# Patient Record
Sex: MALE | Race: WHITE | NOT HISPANIC OR LATINO | Employment: OTHER | URBAN - METROPOLITAN AREA
[De-identification: names, ages, dates, MRNs, and addresses within clinical notes are randomized per-mention and may not be internally consistent; named-entity substitution may affect disease eponyms.]

---

## 2017-01-20 ENCOUNTER — OFFICE VISIT (OUTPATIENT)
Dept: FAMILY MEDICINE | Facility: CLINIC | Age: 52
End: 2017-01-20
Payer: COMMERCIAL

## 2017-01-20 VITALS
DIASTOLIC BLOOD PRESSURE: 70 MMHG | SYSTOLIC BLOOD PRESSURE: 122 MMHG | BODY MASS INDEX: 27.74 KG/M2 | TEMPERATURE: 98 F | WEIGHT: 172.63 LBS | HEART RATE: 71 BPM | HEIGHT: 66 IN

## 2017-01-20 DIAGNOSIS — R53.83 FATIGUE, UNSPECIFIED TYPE: ICD-10-CM

## 2017-01-20 DIAGNOSIS — R39.12 WEAK URINE STREAM: ICD-10-CM

## 2017-01-20 DIAGNOSIS — Z00.00 ROUTINE GENERAL MEDICAL EXAMINATION AT A HEALTH CARE FACILITY: Primary | ICD-10-CM

## 2017-01-20 DIAGNOSIS — R25.2 CRAMPS OF LOWER EXTREMITY, UNSPECIFIED LATERALITY: ICD-10-CM

## 2017-01-20 DIAGNOSIS — Z12.11 SCREENING FOR COLON CANCER: ICD-10-CM

## 2017-01-20 PROCEDURE — 99999 PR PBB SHADOW E&M-EST. PATIENT-LVL III: CPT | Mod: PBBFAC,,, | Performed by: NURSE PRACTITIONER

## 2017-01-20 PROCEDURE — 99396 PREV VISIT EST AGE 40-64: CPT | Mod: S$GLB,,, | Performed by: NURSE PRACTITIONER

## 2017-01-20 RX ORDER — ALPRAZOLAM 0.5 MG/1
TABLET ORAL
Refills: 0 | COMMUNITY
Start: 2016-10-27 | End: 2017-01-20

## 2017-01-20 NOTE — PROGRESS NOTES
Subjective:       Patient ID: Chino Ramirez is a 51 y.o. male.    Chief Complaint: Annual Exam    HPI Comments: Here for annual exam. Social, medical, surgical and family history reviewed and updated today, states some feelings of fatigue, requests testosterone level be done.    Vitals:    01/20/17 0814   BP: 122/70   Pulse: 71   Temp: 98.4 °F (36.9 °C)     Review of Systems   Constitutional: Positive for fatigue. Negative for chills and fever.   HENT: Negative.    Respiratory: Negative for shortness of breath.    Cardiovascular: Negative for chest pain, palpitations and leg swelling.   Gastrointestinal: Negative for blood in stool, diarrhea, nausea and vomiting.   Genitourinary:        No nocturia, occ has weak urine stream   Musculoskeletal: Negative.    Skin: Negative.    Psychiatric/Behavioral: Negative for dysphoric mood, sleep disturbance and suicidal ideas. The patient is not nervous/anxious.        Past Medical History   Diagnosis Date    Allergic rhinitis due to other allergen 8/11/2010    ED (erectile dysfunction)     Follow-up examination, following unspecified surgery     HLD (hyperlipidemia) 1/2/2012    Hyperlipidemia     Hypogonadism male 1/2/2012    Osteoarthrosis, unspecified whether generalized or localized, lower leg 9/18/2014    PONV (postoperative nausea and vomiting)     Tendonitis 2014     right elbow     Objective:      Physical Exam   Constitutional: He is oriented to person, place, and time. Vital signs are normal. He appears well-developed and well-nourished. He is active and cooperative. He does not have a sickly appearance. He does not appear ill.   HENT:   Head: Normocephalic.   Right Ear: Tympanic membrane, external ear and ear canal normal.   Left Ear: Tympanic membrane, external ear and ear canal normal.   Mouth/Throat: Oropharynx is clear and moist.   Eyes: Conjunctivae and EOM are normal. Pupils are equal, round, and reactive to light.   Neck: Neck supple. Carotid  bruit is not present. Thyromegaly present. No thyroid mass present.   Cardiovascular: Normal rate, regular rhythm, S1 normal, S2 normal, normal heart sounds and intact distal pulses.    No murmur heard.  Pulses:       Posterior tibial pulses are 2+ on the right side, and 2+ on the left side.   No edema teddy   Pulmonary/Chest: Effort normal and breath sounds normal.   Abdominal: Soft. Bowel sounds are normal. He exhibits no abdominal bruit. There is no hepatosplenomegaly. There is no tenderness.   Musculoskeletal: Normal range of motion.   Lymphadenopathy:     He has no cervical adenopathy.        Right: No supraclavicular adenopathy present.        Left: No supraclavicular adenopathy present.   Neurological: He is alert and oriented to person, place, and time.   Skin: Skin is warm and dry.   Psychiatric: He has a normal mood and affect.   Nursing note and vitals reviewed.      Assessment:       1. Routine general medical examination at a health care facility    2. Weak urine stream    3. Screening for colon cancer    4. Cramps of lower extremity, unspecified laterality    5. Fatigue, unspecified type        Plan:       Routine general medical examination at a health care facility  -     Lipid panel; Future; Expected date: 1/20/17  -     CBC auto differential; Future; Expected date: 1/20/17  -     Comprehensive metabolic panel; Future; Expected date: 1/20/17  -     TSH; Future; Expected date: 1/20/17  -     Testosterone; Future; Expected date: 1/20/17  -     Testosterone, free; Future; Expected date: 1/20/17  -     PSA, total and free; Future; Expected date: 1/20/17  -     Magnesium; Future; Expected date: 1/20/17    Weak urine stream  -     PSA, total and free; Future; Expected date: 1/20/17    Screening for colon cancer  -     Ambulatory referral to Gastroenterology    Cramps of lower extremity, unspecified laterality  -     Magnesium; Future; Expected date: 1/20/17    Fatigue, unspecified type  -     TSH; Future;  Expected date: 1/20/17  -     Testosterone; Future; Expected date: 1/20/17  -     Testosterone, free; Future; Expected date: 1/20/17  -     Magnesium; Future; Expected date: 1/20/17        declines flu and prevnar 13, fasting labs ordered, encouarged to do before 9am, verbalized understanding. RTC annually or sooner if needed.    Return in about 1 year (around 1/20/2018) for pending test results.

## 2017-01-20 NOTE — MR AVS SNAPSHOT
[facility]  [address]  [facility] [address]  Phone: [phone]  Fax: [phone]                  Chino Ramirez   2017 8:00 AM   Office Visit    Description:  Male : 1965   Provider:  MARNI Medrano   Department:  [facility]           Reason for Visit     Annual Exam           Diagnoses this Visit        Comments    Routine general medical examination at a health care facility    -  Primary     Weak urine stream         Screening for colon cancer         Cramps of lower extremity, unspecified laterality         Fatigue, unspecified type                To Do List           Future Appointments        Provider Department Dept Phone    2017 11:15 AM LAB, [place] [facility] [phone]    2/3/2017 8:30 AM MUKUND Virgen OD [facility] Optometry [phone]    2017 2:20 PM Terence Newton MD [facility] Gastroenterology [phone]      Goals (5 Years of Data)     None      [facility]     [facility] - 24/7 Assistance  Registered nurses in the [facility] Center provide clinical advisement, health education, appointment booking, and other advisory services.  Call for this free service at [phone].             Medications           Message regarding Medications     Verify the changes and/or additions to your medication regime listed below are the same as discussed with your clinician today.  If any of these changes or additions are incorrect, please notify your healthcare provider.        STOP taking these medications     alprazolam (XANAX) 0.5 MG tablet TK 1 T PO  BID PRA    esomeprazole (NEXIUM) 40 MG capsule Take 1 capsule (40 mg total) by mouth before breakfast.           Verify that the below list of medications is an accurate representation of the medications you are currently taking.  If none reported, the list may be blank. If incorrect, please contact your  "healthcare provider. Carry this list with you in case of emergency.           Current Medications     fluticasone (FLONASE) 50 mcg/actuation nasal spray 2 sprays by Each Nare route once daily.    multivitamin capsule Take 1 capsule by mouth once daily.           Clinical Reference Information           Vital Signs - Last Recorded  Most recent update: 1/20/2017  8:17 AM by Diana Arias MA    BP Pulse Temp Ht Wt BMI    122/70 71 98.4 °F (36.9 °C) (Oral) 5' 6" (1.676 m) 78.3 kg (172 lb 9.9 oz) 27.86 kg/m2      Blood Pressure          Most Recent Value    BP  122/70      Allergies as of 1/20/2017     Cat Hair Extract      Immunizations Administered on Date of Encounter - 1/20/2017     None      Orders Placed During Today's Visit      Normal Orders This Visit    Ambulatory referral to Gastroenterology     Future Labs/Procedures Expected by Expires    CBC auto differential  1/20/2017 3/21/2018    Comprehensive metabolic panel  1/20/2017 3/21/2018    Lipid panel  1/20/2017 3/21/2018    Magnesium  1/20/2017 3/21/2018    PSA, total and free  1/20/2017 3/21/2018    Testosterone, free  1/20/2017 3/21/2018    Testosterone  1/20/2017 3/21/2018    TSH  1/20/2017 3/21/2018      "

## 2017-01-21 ENCOUNTER — LAB VISIT (OUTPATIENT)
Dept: LAB | Facility: HOSPITAL | Age: 52
End: 2017-01-21
Attending: FAMILY MEDICINE
Payer: COMMERCIAL

## 2017-01-21 DIAGNOSIS — Z00.00 ROUTINE GENERAL MEDICAL EXAMINATION AT A HEALTH CARE FACILITY: ICD-10-CM

## 2017-01-21 DIAGNOSIS — R39.12 WEAK URINE STREAM: ICD-10-CM

## 2017-01-21 DIAGNOSIS — R53.83 FATIGUE, UNSPECIFIED TYPE: ICD-10-CM

## 2017-01-21 DIAGNOSIS — R25.2 CRAMPS OF LOWER EXTREMITY, UNSPECIFIED LATERALITY: ICD-10-CM

## 2017-01-21 LAB
ALBUMIN SERPL BCP-MCNC: 3.7 G/DL
ALP SERPL-CCNC: 45 U/L
ALT SERPL W/O P-5'-P-CCNC: 24 U/L
ANION GAP SERPL CALC-SCNC: 6 MMOL/L
AST SERPL-CCNC: 28 U/L
BASOPHILS # BLD AUTO: 0.06 K/UL
BASOPHILS NFR BLD: 1.1 %
BILIRUB SERPL-MCNC: 0.4 MG/DL
BUN SERPL-MCNC: 14 MG/DL
CALCIUM SERPL-MCNC: 9 MG/DL
CHLORIDE SERPL-SCNC: 108 MMOL/L
CHOLEST/HDLC SERPL: 6.3 {RATIO}
CO2 SERPL-SCNC: 27 MMOL/L
CREAT SERPL-MCNC: 1.3 MG/DL
DIFFERENTIAL METHOD: ABNORMAL
EOSINOPHIL # BLD AUTO: 0.5 K/UL
EOSINOPHIL NFR BLD: 9.7 %
ERYTHROCYTE [DISTWIDTH] IN BLOOD BY AUTOMATED COUNT: 13 %
EST. GFR  (AFRICAN AMERICAN): >60 ML/MIN/1.73 M^2
EST. GFR  (NON AFRICAN AMERICAN): >60 ML/MIN/1.73 M^2
GLUCOSE SERPL-MCNC: 86 MG/DL
HCT VFR BLD AUTO: 42.1 %
HDL/CHOLESTEROL RATIO: 15.9 %
HDLC SERPL-MCNC: 182 MG/DL
HDLC SERPL-MCNC: 29 MG/DL
HGB BLD-MCNC: 14.4 G/DL
LDLC SERPL CALC-MCNC: 134.8 MG/DL
LYMPHOCYTES # BLD AUTO: 2.1 K/UL
LYMPHOCYTES NFR BLD: 39.2 %
MAGNESIUM SERPL-MCNC: 2.2 MG/DL
MCH RBC QN AUTO: 29.9 PG
MCHC RBC AUTO-ENTMCNC: 34.2 %
MCV RBC AUTO: 88 FL
MONOCYTES # BLD AUTO: 0.5 K/UL
MONOCYTES NFR BLD: 10.1 %
NEUTROPHILS # BLD AUTO: 2.1 K/UL
NEUTROPHILS NFR BLD: 39.7 %
NONHDLC SERPL-MCNC: 153 MG/DL
PLATELET # BLD AUTO: 204 K/UL
PMV BLD AUTO: 11.4 FL
POTASSIUM SERPL-SCNC: 4.2 MMOL/L
PROSTATE SPECIFIC ANTIGEN, TOTAL: 1.1 NG/ML
PROT SERPL-MCNC: 7 G/DL
PSA FREE MFR SERPL: 21.82 %
PSA FREE SERPL-MCNC: 0.24 NG/ML
RBC # BLD AUTO: 4.81 M/UL
SODIUM SERPL-SCNC: 141 MMOL/L
TESTOST SERPL-MCNC: 743 NG/DL
TRIGL SERPL-MCNC: 91 MG/DL
TSH SERPL DL<=0.005 MIU/L-ACNC: 1.68 UIU/ML
WBC # BLD AUTO: 5.25 K/UL

## 2017-01-21 PROCEDURE — 84443 ASSAY THYROID STIM HORMONE: CPT

## 2017-01-21 PROCEDURE — 83735 ASSAY OF MAGNESIUM: CPT

## 2017-01-21 PROCEDURE — 85025 COMPLETE CBC W/AUTO DIFF WBC: CPT

## 2017-01-21 PROCEDURE — 84403 ASSAY OF TOTAL TESTOSTERONE: CPT

## 2017-01-21 PROCEDURE — 84402 ASSAY OF FREE TESTOSTERONE: CPT

## 2017-01-21 PROCEDURE — 84153 ASSAY OF PSA TOTAL: CPT

## 2017-01-21 PROCEDURE — 80053 COMPREHEN METABOLIC PANEL: CPT

## 2017-01-21 PROCEDURE — 80061 LIPID PANEL: CPT

## 2017-01-25 LAB — TESTOST FREE SERPL-MCNC: 13.4 PG/ML

## 2017-01-27 ENCOUNTER — TELEPHONE (OUTPATIENT)
Dept: FAMILY MEDICINE | Facility: CLINIC | Age: 52
End: 2017-01-27

## 2017-01-30 ENCOUNTER — OFFICE VISIT (OUTPATIENT)
Dept: OPTOMETRY | Facility: CLINIC | Age: 52
End: 2017-01-30
Payer: COMMERCIAL

## 2017-01-30 DIAGNOSIS — H02.055 TRICHIASIS OF LEFT LOWER EYELID WITHOUT ENTROPION: Primary | ICD-10-CM

## 2017-01-30 PROCEDURE — 67820 REVISE EYELASHES: CPT | Mod: E2,S$GLB,, | Performed by: OPTOMETRIST

## 2017-01-30 PROCEDURE — 99999 PR PBB SHADOW E&M-EST. PATIENT-LVL I: CPT | Mod: PBBFAC,,, | Performed by: OPTOMETRIST

## 2017-01-30 PROCEDURE — 99499 UNLISTED E&M SERVICE: CPT | Mod: S$GLB,,, | Performed by: OPTOMETRIST

## 2017-01-30 NOTE — PROGRESS NOTES
HPI     Eye Problem    Additional comments: hx of trichiasis --- needs lashes pulled OS           Eye Pain    Additional comments: OS red, irritated x 5 days -- no gtts           Comments   Recurrent spk / conj injection due to trichiasis LLL       Last edited by MUKUND Virgen, OD on 1/30/2017 12:02 PM. (History)            Assessment /Plan     For exam results, see Encounter Report.    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

## 2017-02-23 ENCOUNTER — OFFICE VISIT (OUTPATIENT)
Dept: OPTOMETRY | Facility: CLINIC | Age: 52
End: 2017-02-23
Payer: COMMERCIAL

## 2017-02-23 DIAGNOSIS — H02.055 TRICHIASIS OF LEFT LOWER EYELID WITHOUT ENTROPION: Primary | ICD-10-CM

## 2017-02-23 PROCEDURE — 99499 UNLISTED E&M SERVICE: CPT | Mod: S$GLB,,, | Performed by: OPTOMETRIST

## 2017-02-23 PROCEDURE — 99999 PR PBB SHADOW E&M-EST. PATIENT-LVL II: CPT | Mod: PBBFAC,,, | Performed by: OPTOMETRIST

## 2017-02-23 PROCEDURE — 67820 REVISE EYELASHES: CPT | Mod: E2,S$GLB,, | Performed by: OPTOMETRIST

## 2017-02-23 NOTE — PROGRESS NOTES
HPI     Eye Problem    Additional comments: pt needs lashed pulled from LLL            Comments   Recurrent trichiasis LLL       Last edited by MUKNUD Virgen, OD on 2/23/2017 10:36 AM. (History)            Assessment /Plan     For exam results, see Encounter Report.    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

## 2017-04-21 ENCOUNTER — TELEPHONE (OUTPATIENT)
Dept: FAMILY MEDICINE | Facility: CLINIC | Age: 52
End: 2017-04-21

## 2017-04-21 NOTE — TELEPHONE ENCOUNTER
----- Message from Mounika Pickett sent at 4/21/2017  2:52 PM CDT -----  Contact: patient  Patient calling in regards to scheduling a same day appt today. He has pain on the right side of ribs and hip. Please advise.  Call back .  Thanks!

## 2017-04-21 NOTE — TELEPHONE ENCOUNTER
LVM for ER /walk in with number to [facility] walk in clinic and also call on call md [facility] if needed or if able to wait call Monday

## 2017-04-24 ENCOUNTER — HOSPITAL ENCOUNTER (OUTPATIENT)
Dept: RADIOLOGY | Facility: HOSPITAL | Age: 52
Discharge: HOME | End: 2017-04-24
Attending: FAMILY MEDICINE
Payer: COMMERCIAL

## 2017-04-24 ENCOUNTER — OFFICE VISIT (OUTPATIENT)
Dept: FAMILY MEDICINE | Facility: CLINIC | Age: 52
End: 2017-04-24
Payer: COMMERCIAL

## 2017-04-24 VITALS
HEIGHT: 66 IN | OXYGEN SATURATION: 97 % | BODY MASS INDEX: 27.35 KG/M2 | SYSTOLIC BLOOD PRESSURE: 118 MMHG | WEIGHT: 170.19 LBS | DIASTOLIC BLOOD PRESSURE: 62 MMHG | RESPIRATION RATE: 18 BRPM | HEART RATE: 61 BPM

## 2017-04-24 DIAGNOSIS — Z11.59 NEED FOR HEPATITIS C SCREENING TEST: ICD-10-CM

## 2017-04-24 DIAGNOSIS — R07.89 RIGHT-SIDED CHEST WALL PAIN: ICD-10-CM

## 2017-04-24 DIAGNOSIS — R07.89 RIGHT-SIDED CHEST WALL PAIN: Primary | ICD-10-CM

## 2017-04-24 PROCEDURE — 71100 X-RAY EXAM RIBS UNI 2 VIEWS: CPT | Mod: TC,PO

## 2017-04-24 PROCEDURE — 99999 PR PBB SHADOW E&M-EST. PATIENT-LVL III: CPT | Mod: PBBFAC,,, | Performed by: FAMILY MEDICINE

## 2017-04-24 PROCEDURE — 71100 X-RAY EXAM RIBS UNI 2 VIEWS: CPT | Mod: 26,,, | Performed by: RADIOLOGY

## 2017-04-24 PROCEDURE — 99214 OFFICE O/P EST MOD 30 MIN: CPT | Mod: S$GLB,,, | Performed by: FAMILY MEDICINE

## 2017-04-24 PROCEDURE — 1160F RVW MEDS BY RX/DR IN RCRD: CPT | Mod: S$GLB,,, | Performed by: FAMILY MEDICINE

## 2017-04-24 NOTE — MR AVS SNAPSHOT
[facility]  [address]  [place] [address]  Phone: [phone]  Fax: [phone]                  Chino Ramirez   2017 7:40 AM   Office Visit    Description:  Male : 1965   Provider:  Víctor Jansen MD   Department:  [facility]           Reason for Visit     Chest Pain           Diagnoses this Visit        Comments    Right-sided chest wall pain    -  Primary     Need for hepatitis C screening test                To Do List           Future Appointments        Provider Department Dept Phone    2017 8:30 AM [facility] XR2 [facility] [phone]    2017 11:15 AM [facility] [place] [facility] [phone]    2017 8:00 AM [facility] US1 [facility] [phone]      Goals (5 Years of Data)     None      [facility]     [facility] Care Line -  Assistance  Unless otherwise directed by your provider, please contact [facility], our nurse care line that is available for  assistance.     Registered nurses in the [facility] provide: appointment scheduling, clinical advisement, health education, and other advisory services.  Call: [phone] (toll free)               Medications           Message regarding Medications     Verify the changes and/or additions to your medication regime listed below are the same as discussed with your clinician today.  If any of these changes or additions are incorrect, please notify your healthcare provider.             Verify that the below list of medications is an accurate representation of the medications you are currently taking.  If none reported, the list may be blank. If incorrect, please contact your healthcare provider. Carry this list with you in case of emergency.           Current Medications     fluticasone (FLONASE) 50 mcg/actuation nasal spray 2 sprays by Each Nare route once daily.    multivitamin capsule Take  "1 capsule by mouth once daily.           Clinical Reference Information           Your Vitals Were     BP Pulse Resp Height Weight SpO2    118/62 (BP Location: Right arm, Patient Position: Sitting, BP Method: Manual) 61 18 5' 6" (1.676 m) 77.2 kg (170 lb 3.1 oz) 97%    BMI                27.47 kg/m2          Blood Pressure          Most Recent Value    BP  118/62      Allergies as of 4/24/2017     Cat Hair Extract      Immunizations Administered on Date of Encounter - 4/24/2017     None      Orders Placed During Today's Visit     Future Labs/Procedures Expected by Expires    Comprehensive metabolic panel  4/24/2017 7/23/2017    Hepatitis C antibody  4/24/2017 6/23/2018    US Abdomen Complete  4/24/2017 7/23/2017    X-Ray Ribs 2 View Right  4/24/2017 4/24/2018      Language Assistance Services     ATTENTION: Language assistance services are available, free of charge. Please call [phone].      ATENCIÓN: Si habla español, tiene a rico disposición servicios gratuitos de asistencia lingüística. Llame al [phone].     CHÚ Ý: N?u b?n nói Ti?ng Vi?t, có các d?ch v? h? tr? ngôn ng? mi?n phí dành cho b?n. G?i s? [phone].         [facility] complies with applicable Federal civil rights laws and does not discriminate on the basis of race, color, national origin, age, disability, or sex.        "

## 2017-04-24 NOTE — PROGRESS NOTES
Subjective:       Patient ID: Chino Ramirez is a 51 y.o. male.    Chief Complaint: Chest Pain (Right sided rib pain)    HPI     C/o right sided chest wall pain x 1 year. Intermittent i.e 2-3 x per week. Lasts for 1-2 hours. Achy in nature. Ps: 3-6/10.  Not worse with movement. On occasion, occurs day after drinking etoh.  No pain now.      Review of Systems        Review of Systems   Constitutional: Negative for fever and chills.   HENT: Negative for hearing loss and neck stiffness.    Eyes: Negative for redness and itching.   Respiratory: Negative for cough and choking.    Cardiovascular: Negative for chest pain and leg swelling.  Abdomen: Negative for abdominal pain and blood in stool.   Genitourinary: Negative for dysuria and flank pain.   Musculoskeletal: Negative for back pain and gait problem.   Neurological: Negative for light-headedness and headaches.   Hematological: Negative for adenopathy.   Psychiatric/Behavioral: Negative for behavioral problems.       Objective:      Physical Exam   HENT:   Head: Atraumatic.   Eyes: Conjunctivae are normal. Pupils are equal, round, and reactive to light.   Neck: Normal range of motion.   Cardiovascular: Normal rate and regular rhythm.    No murmur heard.  Pulmonary/Chest: Effort normal and breath sounds normal. He has no wheezes. He exhibits no tenderness.   Abdominal: Soft. Bowel sounds are normal. There is no tenderness.   Lymphadenopathy:     He has no cervical adenopathy.       Assessment:       1. Right-sided chest wall pain    2. Need for hepatitis C screening test        Plan:       Right-sided chest wall pain  -     US Abdomen Complete; Future; Expected date: 4/24/17  -     X-Ray Ribs 2 View Right; Future; Expected date: 4/24/17  -     Comprehensive metabolic panel; Future; Expected date: 4/24/17    Need for hepatitis C screening test  -     Hepatitis C antibody; Future; Expected date: 4/24/17          Plan:  Labs today

## 2017-04-30 ENCOUNTER — PATIENT MESSAGE (OUTPATIENT)
Dept: FAMILY MEDICINE | Facility: CLINIC | Age: 52
End: 2017-04-30

## 2017-05-08 ENCOUNTER — HOSPITAL ENCOUNTER (OUTPATIENT)
Dept: RADIOLOGY | Facility: HOSPITAL | Age: 52
Discharge: HOME | End: 2017-05-08
Attending: FAMILY MEDICINE
Payer: COMMERCIAL

## 2017-05-08 DIAGNOSIS — R07.89 RIGHT-SIDED CHEST WALL PAIN: ICD-10-CM

## 2017-05-08 PROCEDURE — 76700 US EXAM ABDOM COMPLETE: CPT | Mod: 26,,, | Performed by: RADIOLOGY

## 2017-05-08 PROCEDURE — 76700 US EXAM ABDOM COMPLETE: CPT | Mod: TC,PO

## 2017-05-15 ENCOUNTER — PATIENT MESSAGE (OUTPATIENT)
Dept: FAMILY MEDICINE | Facility: CLINIC | Age: 52
End: 2017-05-15

## 2017-07-13 ENCOUNTER — TELEPHONE (OUTPATIENT)
Dept: OPTOMETRY | Facility: CLINIC | Age: 52
End: 2017-07-13

## 2017-07-13 NOTE — TELEPHONE ENCOUNTER
----- Message from Richard Shaw sent at 7/13/2017  2:10 PM CDT -----  Contact: same  Patient called in and stated he wanted to see Dr. Virgen today 7/13/17 and stated Dr. Virgen is aware of his situation.  Patient does have appt tomorrow 7/14/17 AM but states he cannot wait.  Patient call back number is [phone]

## 2017-07-14 ENCOUNTER — OFFICE VISIT (OUTPATIENT)
Dept: OPTOMETRY | Facility: CLINIC | Age: 52
End: 2017-07-14
Payer: COMMERCIAL

## 2017-07-14 DIAGNOSIS — H02.055 TRICHIASIS OF LEFT LOWER EYELID WITHOUT ENTROPION: Primary | ICD-10-CM

## 2017-07-14 PROCEDURE — 67820 REVISE EYELASHES: CPT | Mod: E2,S$GLB,, | Performed by: OPTOMETRIST

## 2017-07-14 PROCEDURE — 99499 UNLISTED E&M SERVICE: CPT | Mod: S$GLB,,, | Performed by: OPTOMETRIST

## 2017-07-14 PROCEDURE — 99999 PR PBB SHADOW E&M-EST. PATIENT-LVL II: CPT | Mod: PBBFAC,,, | Performed by: OPTOMETRIST

## 2017-07-14 NOTE — PROGRESS NOTES
HPI     Eye Pain    Additional comments: needs lash pulled from LLL            Comments   Recurrent trichiasis OS       Last edited by MUKUND Virgen, OD on 7/14/2017 12:23 PM. (History)        ROS     Positive for: Eyes    Negative for: Constitutional, Gastrointestinal, Neurological, Skin,   Genitourinary, Musculoskeletal, HENT, Endocrine, Cardiovascular,   Respiratory, Psychiatric, Allergic/Imm, Heme/Lymph    Last edited by MUKUND Virgen, OD on 7/14/2017 12:23 PM. (History)        Assessment /Plan     For exam results, see Encounter Report.    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

## 2017-10-05 ENCOUNTER — PATIENT OUTREACH (OUTPATIENT)
Dept: ADMINISTRATIVE | Facility: HOSPITAL | Age: 52
End: 2017-10-05

## 2017-10-19 ENCOUNTER — OFFICE VISIT (OUTPATIENT)
Dept: OPTOMETRY | Facility: CLINIC | Age: 52
End: 2017-10-19
Payer: COMMERCIAL

## 2017-10-19 DIAGNOSIS — H02.055 TRICHIASIS OF LEFT LOWER EYELID WITHOUT ENTROPION: Primary | ICD-10-CM

## 2017-10-19 PROCEDURE — 99999 PR PBB SHADOW E&M-EST. PATIENT-LVL II: CPT | Mod: PBBFAC,,, | Performed by: OPTOMETRIST

## 2017-10-19 PROCEDURE — 67820 REVISE EYELASHES: CPT | Mod: LT,S$GLB,, | Performed by: OPTOMETRIST

## 2017-10-19 PROCEDURE — 99499 UNLISTED E&M SERVICE: CPT | Mod: S$GLB,,, | Performed by: OPTOMETRIST

## 2017-10-19 NOTE — PROGRESS NOTES
HPI     Eye Problem    Additional comments: hx of trichiasis -- needs lashed pulled OS // no   gtts           Eye Pain    Additional comments: some discomfort           Comments   Recurrent trichiasis OS       Last edited by MUKUND Virgen, OD on 10/19/2017  9:46 AM. (History)        ROS     Positive for: Eyes    Negative for: Constitutional, Gastrointestinal, Neurological, Skin,   Genitourinary, Musculoskeletal, HENT, Endocrine, Cardiovascular,   Respiratory, Psychiatric, Allergic/Imm, Heme/Lymph    Last edited by MUKUND Virgen, OD on 10/19/2017  9:46 AM. (History)        Assessment /Plan     For exam results, see Encounter Report.    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

## 2017-11-10 ENCOUNTER — PATIENT OUTREACH (OUTPATIENT)
Dept: ADMINISTRATIVE | Facility: HOSPITAL | Age: 52
End: 2017-11-10

## 2017-11-10 NOTE — LETTER
November 10, 2017    Chino Ramirez  [address]  [facility] [address]             [facility]  [address]  [facility] [address]  Phone: [phone] Dear Mr. Ramirez:    We have tried to reach you by [facility] email unsuccessfully.     [facility] is committed to your overall health and would like to ensure that you are up to date on your recommended health testing.   Dr. Jansen has found that you may be due for the following:     Tetanus immunization   Colonoscopy (Colorectal screening)   Influenza vaccine       If you have had any of the above done at another facility, please let us know by calling or faxing to the numbers below so that your medical record can be updated. If you have a copy of these records, please fax them to the fax number below.  If not, please call [phone] so that we can get the necessary information to obtain copies from that facility.     Otherwise, please schedule these appointments at your earliest convenience by calling [phone] or going to [email or web address]       If you have any questions or concerns, please don't hesitate to call.    Sincerely,    Amelia Hadley  Clinical Care Coordinator  [facility] [address] [place] [address]  Phone: [phone]   Fax: [phone]

## 2017-11-13 ENCOUNTER — OFFICE VISIT (OUTPATIENT)
Dept: FAMILY MEDICINE | Facility: CLINIC | Age: 52
End: 2017-11-13
Payer: COMMERCIAL

## 2017-11-13 VITALS
SYSTOLIC BLOOD PRESSURE: 102 MMHG | WEIGHT: 174.38 LBS | OXYGEN SATURATION: 97 % | DIASTOLIC BLOOD PRESSURE: 64 MMHG | HEIGHT: 66 IN | HEART RATE: 66 BPM | BODY MASS INDEX: 28.03 KG/M2

## 2017-11-13 DIAGNOSIS — Z00.00 ROUTINE MEDICAL EXAM: Primary | ICD-10-CM

## 2017-11-13 DIAGNOSIS — Z12.11 COLON CANCER SCREENING: ICD-10-CM

## 2017-11-13 DIAGNOSIS — R16.0 HEPATOMEGALY: ICD-10-CM

## 2017-11-13 DIAGNOSIS — R68.82 DECREASED LIBIDO: ICD-10-CM

## 2017-11-13 PROCEDURE — 99999 PR PBB SHADOW E&M-EST. PATIENT-LVL III: CPT | Mod: PBBFAC,,, | Performed by: FAMILY MEDICINE

## 2017-11-13 PROCEDURE — 99396 PREV VISIT EST AGE 40-64: CPT | Mod: S$GLB,,, | Performed by: FAMILY MEDICINE

## 2017-11-13 NOTE — PROGRESS NOTES
Subjective:       Patient ID: Chino Ramirez is a 52 y.o. male.    Chief Complaint: Annual Exam    HPI     Here for a check up.    Reports mild decrease in sex drive. Requesting testosterone level.     Reports that he had a life screen approx 1-2 months ago.  Reports mild elevation in his lfts.  Had a liver us in 4/2017 which showed minimal hepatomegaly. No ruq abd pain.       Review of Systems      Review of Systems   Constitutional: Negative for fever and chills.   HENT: Negative for hearing loss and neck stiffness.    Eyes: Negative for redness and itching.   Respiratory: Negative for cough and choking.    Cardiovascular: Negative for chest pain and leg swelling.  Abdomen: Negative for abdominal pain and blood in stool.   Genitourinary: Negative for dysuria and flank pain.   Musculoskeletal: Negative for back pain and gait problem.   Neurological: Negative for light-headedness and headaches.   Hematological: Negative for adenopathy.   Psychiatric/Behavioral: Negative for behavioral problems.     Objective:      Physical Exam   Constitutional: He is oriented to person, place, and time. He appears well-developed and well-nourished.   HENT:   Head: Normocephalic and atraumatic.   Eyes: Conjunctivae are normal. Pupils are equal, round, and reactive to light.   Neck: Normal range of motion. Neck supple.   Cardiovascular: Normal rate, regular rhythm and normal heart sounds.  Exam reveals no friction rub.    No murmur heard.  Pulmonary/Chest: Effort normal and breath sounds normal. He has no wheezes.   Abdominal: Soft. Bowel sounds are normal. There is no tenderness.   Musculoskeletal: Normal range of motion.   Lymphadenopathy:     He has no cervical adenopathy.   Neurological: He is alert and oriented to person, place, and time. He has normal reflexes. No cranial nerve deficit. Coordination normal.   Skin: Skin is warm and dry.   Psychiatric: He has a normal mood and affect. His behavior is normal.        Assessment:       1. Routine medical exam    2. Hepatomegaly    3. Decreased libido    4. Colon cancer screening        Plan:       Routine medical exam  -     Lipid panel; Future; Expected date: 11/13/2017    Hepatomegaly  -     Comprehensive metabolic panel; Future; Expected date: 11/13/2017    Decreased libido  -     Testosterone; Future; Expected date: 11/13/2017    Colon cancer screening  -     Fecal Immunochemical Test (iFOBT); Future; Expected date: 11/27/2017          Plan:  See orders

## 2017-11-22 ENCOUNTER — OFFICE VISIT (OUTPATIENT)
Dept: FAMILY MEDICINE | Facility: CLINIC | Age: 52
End: 2017-11-22
Payer: COMMERCIAL

## 2017-11-22 VITALS
DIASTOLIC BLOOD PRESSURE: 72 MMHG | BODY MASS INDEX: 28.31 KG/M2 | OXYGEN SATURATION: 98 % | RESPIRATION RATE: 17 BRPM | WEIGHT: 176.13 LBS | HEIGHT: 66 IN | TEMPERATURE: 98 F | HEART RATE: 65 BPM | SYSTOLIC BLOOD PRESSURE: 124 MMHG

## 2017-11-22 DIAGNOSIS — L60.8 DISCOLORED NAILS: Primary | ICD-10-CM

## 2017-11-22 PROCEDURE — 99999 PR PBB SHADOW E&M-EST. PATIENT-LVL III: CPT | Mod: PBBFAC,,, | Performed by: PHYSICIAN ASSISTANT

## 2017-11-22 PROCEDURE — 99213 OFFICE O/P EST LOW 20 MIN: CPT | Mod: S$GLB,,, | Performed by: PHYSICIAN ASSISTANT

## 2017-11-22 NOTE — PROGRESS NOTES
Subjective:       Patient ID: Chino Ramirez is a 52 y.o. male.    Chief Complaint: Dizziness and numbness left arm    HPI   Pt has noted yellowish color to fingernails on L hand only  Recent eval for lightheadedness and intermittent numbness in L arm  Urine and bowels normal  Review of Systems   Constitutional: Negative.  Negative for activity change, appetite change, chills, diaphoresis, fatigue, fever and unexpected weight change.   HENT: Negative.  Negative for congestion.    Eyes: Negative.    Respiratory: Negative.  Negative for cough, shortness of breath and wheezing.    Cardiovascular: Negative.  Negative for chest pain, palpitations and leg swelling.   Gastrointestinal: Negative.  Negative for abdominal distention, abdominal pain, anal bleeding, blood in stool, constipation, diarrhea, nausea, rectal pain and vomiting.   Endocrine: Negative.    Genitourinary: Negative.    Musculoskeletal: Negative.    Skin: Positive for color change. Negative for pallor, rash and wound.   Neurological: Positive for light-headedness.   Psychiatric/Behavioral: Negative.  Negative for agitation, behavioral problems, confusion, decreased concentration, dysphoric mood, hallucinations, self-injury, sleep disturbance and suicidal ideas. The patient is not nervous/anxious and is not hyperactive.        Objective:      Physical Exam   Constitutional: He is oriented to person, place, and time. He appears well-developed and well-nourished. No distress.   HENT:   Head: Normocephalic and atraumatic.   Right Ear: External ear normal.   Left Ear: External ear normal.   Nose: Nose normal.   Mouth/Throat: Oropharynx is clear and moist. No oropharyngeal exudate.   Mucus clear   Eyes: Conjunctivae and EOM are normal. Pupils are equal, round, and reactive to light. No scleral icterus.   Neck: Normal range of motion. Neck supple. No tracheal deviation present. No thyromegaly present.   Cardiovascular: Normal rate, regular rhythm, normal  heart sounds and intact distal pulses.  Exam reveals no friction rub.    No murmur heard.  Pulmonary/Chest: Effort normal and breath sounds normal. No respiratory distress. He has no wheezes. He has no rales. He exhibits no tenderness.   Abdominal: Soft. Bowel sounds are normal. He exhibits no distension and no mass. There is no tenderness. There is no rebound and no guarding. No hernia.   No organomegaly noted   Musculoskeletal: He exhibits no edema.   Lymphadenopathy:     He has no cervical adenopathy.   Neurological: He is alert and oriented to person, place, and time.   Skin: Skin is warm and dry. Capillary refill takes less than 2 seconds.   Yellowish tint toenails L hand only  L hand and feet nails are normal  No evidence of trauma     Psychiatric: He has a normal mood and affect. His behavior is normal. Judgment and thought content normal.   Vitals reviewed.      Assessment:       1. Discolored nails        Plan:       Chino TAYLOR was seen today for dizziness and numbness left arm.    Diagnoses and all orders for this visit:    Discolored nails    informed pt that nail color is from dye or chemical exposure

## 2017-11-24 ENCOUNTER — LAB VISIT (OUTPATIENT)
Dept: LAB | Facility: HOSPITAL | Age: 52
End: 2017-11-24
Attending: FAMILY MEDICINE
Payer: COMMERCIAL

## 2017-11-24 DIAGNOSIS — Z00.00 ROUTINE MEDICAL EXAM: ICD-10-CM

## 2017-11-24 DIAGNOSIS — R68.82 DECREASED LIBIDO: ICD-10-CM

## 2017-11-24 DIAGNOSIS — R16.0 HEPATOMEGALY: ICD-10-CM

## 2017-11-24 LAB
ALBUMIN SERPL BCP-MCNC: 3.7 G/DL
ALP SERPL-CCNC: 51 U/L
ALT SERPL W/O P-5'-P-CCNC: 30 U/L
ANION GAP SERPL CALC-SCNC: 6 MMOL/L
AST SERPL-CCNC: 25 U/L
BILIRUB SERPL-MCNC: 0.4 MG/DL
BUN SERPL-MCNC: 17 MG/DL
CALCIUM SERPL-MCNC: 9.7 MG/DL
CHLORIDE SERPL-SCNC: 107 MMOL/L
CHOLEST SERPL-MCNC: 169 MG/DL
CHOLEST/HDLC SERPL: 5.6 {RATIO}
CO2 SERPL-SCNC: 28 MMOL/L
CREAT SERPL-MCNC: 1.1 MG/DL
EST. GFR  (AFRICAN AMERICAN): >60 ML/MIN/1.73 M^2
EST. GFR  (NON AFRICAN AMERICAN): >60 ML/MIN/1.73 M^2
GLUCOSE SERPL-MCNC: 96 MG/DL
HDLC SERPL-MCNC: 30 MG/DL
HDLC SERPL: 17.8 %
LDLC SERPL CALC-MCNC: 101.8 MG/DL
NONHDLC SERPL-MCNC: 139 MG/DL
POTASSIUM SERPL-SCNC: 4.2 MMOL/L
PROT SERPL-MCNC: 7.4 G/DL
SODIUM SERPL-SCNC: 141 MMOL/L
TESTOST SERPL-MCNC: 524 NG/DL
TRIGL SERPL-MCNC: 186 MG/DL

## 2017-11-24 PROCEDURE — 36415 COLL VENOUS BLD VENIPUNCTURE: CPT | Mod: PO

## 2017-11-24 PROCEDURE — 80053 COMPREHEN METABOLIC PANEL: CPT

## 2017-11-24 PROCEDURE — 84403 ASSAY OF TOTAL TESTOSTERONE: CPT

## 2017-11-24 PROCEDURE — 80061 LIPID PANEL: CPT

## 2018-01-30 ENCOUNTER — OFFICE VISIT (OUTPATIENT)
Dept: OPTOMETRY | Facility: CLINIC | Age: 53
End: 2018-01-30
Payer: COMMERCIAL

## 2018-01-30 DIAGNOSIS — H16.142 SUPERFICIAL PUNCTATE KERATITIS, LEFT: ICD-10-CM

## 2018-01-30 DIAGNOSIS — H02.055 TRICHIASIS OF LEFT LOWER EYELID WITHOUT ENTROPION: Primary | ICD-10-CM

## 2018-01-30 PROCEDURE — 99499 UNLISTED E&M SERVICE: CPT | Mod: S$GLB,,, | Performed by: OPTOMETRIST

## 2018-01-30 PROCEDURE — 67820 REVISE EYELASHES: CPT | Mod: LT,S$GLB,, | Performed by: OPTOMETRIST

## 2018-01-30 PROCEDURE — 99999 PR PBB SHADOW E&M-EST. PATIENT-LVL I: CPT | Mod: PBBFAC,,, | Performed by: OPTOMETRIST

## 2018-01-30 NOTE — PROGRESS NOTES
Assessment /Plan     For exam results, see Encounter Report.    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

## 2018-03-19 ENCOUNTER — OFFICE VISIT (OUTPATIENT)
Dept: OPTOMETRY | Facility: CLINIC | Age: 53
End: 2018-03-19
Payer: COMMERCIAL

## 2018-03-19 DIAGNOSIS — H02.055 TRICHIASIS OF LEFT LOWER EYELID WITHOUT ENTROPION: Primary | ICD-10-CM

## 2018-03-19 PROCEDURE — 92012 INTRM OPH EXAM EST PATIENT: CPT | Mod: S$GLB,,, | Performed by: OPTOMETRIST

## 2018-03-19 PROCEDURE — 99212 OFFICE O/P EST SF 10 MIN: CPT | Mod: PBBFAC,PO | Performed by: OPTOMETRIST

## 2018-03-19 PROCEDURE — 99999 PR PBB SHADOW E&M-EST. PATIENT-LVL II: CPT | Mod: PBBFAC,,, | Performed by: OPTOMETRIST

## 2018-03-19 NOTE — PROGRESS NOTES
HPI     Urgent care-    Pt complains of pain and discomfort in left eye.  States pain scale 7. No   gtts. Pain started yesterday 3/18/18    Typical s/s as previous    Last edited by MUKUND Virgen, OD on 3/19/2018 11:12 AM. (History)        ROS     Positive for: Eyes    Negative for: Constitutional, Gastrointestinal, Neurological, Skin,   Genitourinary, Musculoskeletal, HENT, Endocrine, Cardiovascular,   Respiratory, Psychiatric, Allergic/Imm, Heme/Lymph    Last edited by MUKUND Virgen, OD on 3/19/2018 11:12 AM. (History)        Assessment /Plan     For exam results, see Encounter Report.    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

## 2018-04-16 ENCOUNTER — OFFICE VISIT (OUTPATIENT)
Dept: OPTOMETRY | Facility: CLINIC | Age: 53
End: 2018-04-16
Payer: COMMERCIAL

## 2018-04-16 DIAGNOSIS — H02.055 TRICHIASIS OF LEFT LOWER EYELID WITHOUT ENTROPION: Primary | ICD-10-CM

## 2018-04-16 PROCEDURE — 99999 PR PBB SHADOW E&M-EST. PATIENT-LVL II: CPT | Mod: PBBFAC,,, | Performed by: OPTOMETRIST

## 2018-04-16 PROCEDURE — 92012 INTRM OPH EXAM EST PATIENT: CPT | Mod: S$GLB,,, | Performed by: OPTOMETRIST

## 2018-04-16 NOTE — PROGRESS NOTES
HPI     Eye Problem    Additional comments: needs lash pulled LLL -- hx of trichiasis // no gtts             Eye Pain    Additional comments: 5/10 on pain scale       Last edited by Esperanza Cruz on 4/16/2018  1:46 PM. (History)        ROS     Positive for: Eyes    Negative for: Constitutional, Gastrointestinal, Neurological, Skin,   Genitourinary, Musculoskeletal, HENT, Endocrine, Cardiovascular,   Respiratory, Psychiatric, Allergic/Imm, Heme/Lymph    Last edited by MUKUND Virgen, OD on 4/16/2018  2:11 PM. (History)        Assessment /Plan     For exam results, see Encounter Report.    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

## 2018-05-11 ENCOUNTER — TELEPHONE (OUTPATIENT)
Dept: FAMILY MEDICINE | Facility: CLINIC | Age: 53
End: 2018-05-11

## 2018-05-11 NOTE — TELEPHONE ENCOUNTER
----- Message from Melinda Phillips sent at 5/11/2018  2:10 PM CDT -----  Type: Needs Medical Advice    Who Called:  Patient  Best Call Back Number: [phone]  Additional Information: Patient would like to speak with nurse concerning scheduling colonoscopy/has question/please call back.

## 2018-06-01 ENCOUNTER — OFFICE VISIT (OUTPATIENT)
Dept: FAMILY MEDICINE | Facility: CLINIC | Age: 53
End: 2018-06-01
Payer: COMMERCIAL

## 2018-06-01 VITALS
TEMPERATURE: 98 F | OXYGEN SATURATION: 99 % | DIASTOLIC BLOOD PRESSURE: 68 MMHG | HEIGHT: 66 IN | SYSTOLIC BLOOD PRESSURE: 116 MMHG | BODY MASS INDEX: 27.88 KG/M2 | HEART RATE: 61 BPM | RESPIRATION RATE: 14 BRPM | WEIGHT: 173.5 LBS

## 2018-06-01 DIAGNOSIS — Z48.02 ENCOUNTER FOR REMOVAL OF SUTURES: Primary | ICD-10-CM

## 2018-06-01 DIAGNOSIS — L03.90 CELLULITIS, UNSPECIFIED CELLULITIS SITE: ICD-10-CM

## 2018-06-01 PROCEDURE — 99999 PR PBB SHADOW E&M-EST. PATIENT-LVL III: CPT | Mod: PBBFAC,,, | Performed by: NURSE PRACTITIONER

## 2018-06-01 PROCEDURE — 99024 POSTOP FOLLOW-UP VISIT: CPT | Mod: S$GLB,,, | Performed by: NURSE PRACTITIONER

## 2018-06-01 PROCEDURE — 99213 OFFICE O/P EST LOW 20 MIN: CPT | Mod: 25,S$GLB,, | Performed by: NURSE PRACTITIONER

## 2018-06-01 PROCEDURE — 3008F BODY MASS INDEX DOCD: CPT | Mod: CPTII,S$GLB,, | Performed by: NURSE PRACTITIONER

## 2018-06-01 RX ORDER — SULFAMETHOXAZOLE AND TRIMETHOPRIM 800; 160 MG/1; MG/1
1 TABLET ORAL 2 TIMES DAILY
Qty: 10 TABLET | Refills: 0 | Status: SHIPPED | OUTPATIENT
Start: 2018-06-01 | End: 2018-06-06

## 2018-06-01 NOTE — PROGRESS NOTES
Subjective:       Patient ID: Chino Ramirez is a 52 y.o. male.    Chief Complaint: Wound Check    Mr. Ramirez is a new patient to me. He presents today for wound check/suture removal    HPI   Seen at urgent care on 1 week ago following machete puncture. He was given TDAP. Reports mild erythema and swelling surrounding wound. Denies drainage, fever.   Vitals:    06/01/18 1343   BP: 116/68   Pulse: 61   Resp: 14   Temp: 98.2 °F (36.8 °C)     Review of Systems   Constitutional: Negative for diaphoresis and fever.   HENT: Negative for facial swelling and trouble swallowing.    Eyes: Negative for discharge and redness.   Respiratory: Negative for cough and shortness of breath.    Cardiovascular: Negative for chest pain and palpitations.   Musculoskeletal: Negative for gait problem.   Skin: Positive for color change and wound.   Neurological: Negative for facial asymmetry and speech difficulty.   Psychiatric/Behavioral: Negative for confusion. The patient is not nervous/anxious.        Past Medical History:   Diagnosis Date    Allergic rhinitis due to other allergen 8/11/2010    ED (erectile dysfunction)     Follow-up examination, following unspecified surgery     HLD (hyperlipidemia) 1/2/2012    Hyperlipidemia     Hypogonadism male 1/2/2012    Osteoarthrosis, unspecified whether generalized or localized, lower leg 9/18/2014    PONV (postoperative nausea and vomiting)     Tendonitis 2014    right elbow     Objective:      Physical Exam   Constitutional: He is oriented to person, place, and time. He does not have a sickly appearance. No distress.   HENT:   Head: Normocephalic.   Right Ear: Hearing normal.   Left Ear: Hearing normal.   Nose: Nose normal.   Eyes: Conjunctivae and lids are normal.   Neck: No JVD present. No tracheal deviation present.   Cardiovascular: Normal rate.    Pulmonary/Chest: Effort normal.   Musculoskeletal: Normal range of motion. He exhibits no deformity.   Neurological: He is alert  and oriented to person, place, and time.   Skin: He is not diaphoretic. No pallor.        Psychiatric: He has a normal mood and affect. His speech is normal and behavior is normal. Judgment and thought content normal. Cognition and memory are normal.   Nursing note and vitals reviewed.      Suture Removal  Date/Time: 6/1/2018 1:55 PM  Performed by: GISELA SUTHERLAND  Authorized by: GISELA SUTHERLAND   Body area: lower extremity  Location details: left lower leg  Wound Appearance: erythematous, warm, tender and no drainage  Sutures Removed: 1  Post-removal: bandaid applied  Complications: No  Patient tolerance: Patient tolerated the procedure well with no immediate complications        Assessment:       1. Encounter for removal of sutures    2. Cellulitis, unspecified cellulitis site        Plan:       Encounter for removal of sutures    Cellulitis, unspecified cellulitis site  -     sulfamethoxazole-trimethoprim 800-160mg (BACTRIM DS) 800-160 mg Tab; Take 1 tablet by mouth 2 (two) times daily.  Dispense: 10 tablet; Refill: 0    Other orders  -     SUTURE REMOVAL      Educated on worsening s/s, when to follow up, wound care  Follow-up if symptoms worsen or fail to improve.

## 2018-08-27 ENCOUNTER — HOSPITAL ENCOUNTER (OUTPATIENT)
Dept: RADIOLOGY | Facility: HOSPITAL | Age: 53
Discharge: HOME | End: 2018-08-27
Attending: ORTHOPAEDIC SURGERY
Payer: COMMERCIAL

## 2018-08-27 ENCOUNTER — OFFICE VISIT (OUTPATIENT)
Dept: ORTHOPEDICS | Facility: CLINIC | Age: 53
End: 2018-08-27
Payer: COMMERCIAL

## 2018-08-27 VITALS — BODY MASS INDEX: 27.8 KG/M2 | WEIGHT: 173 LBS | HEIGHT: 66 IN

## 2018-08-27 DIAGNOSIS — M79.605 LEFT LEG PAIN: Primary | ICD-10-CM

## 2018-08-27 DIAGNOSIS — M79.605 LEFT LEG PAIN: ICD-10-CM

## 2018-08-27 DIAGNOSIS — S81.812S LACERATION OF LEFT LOWER LEG, SEQUELA: Primary | ICD-10-CM

## 2018-08-27 DIAGNOSIS — M79.605 PAIN OF LEFT LEG: ICD-10-CM

## 2018-08-27 PROCEDURE — 99203 OFFICE O/P NEW LOW 30 MIN: CPT | Mod: S$GLB,,, | Performed by: ORTHOPAEDIC SURGERY

## 2018-08-27 PROCEDURE — 73590 X-RAY EXAM OF LOWER LEG: CPT | Mod: TC,PO,LT

## 2018-08-27 PROCEDURE — 73590 X-RAY EXAM OF LOWER LEG: CPT | Mod: 26,LT,, | Performed by: RADIOLOGY

## 2018-08-27 PROCEDURE — 99999 PR PBB SHADOW E&M-EST. PATIENT-LVL II: CPT | Mod: PBBFAC,,, | Performed by: ORTHOPAEDIC SURGERY

## 2018-08-27 PROCEDURE — 3008F BODY MASS INDEX DOCD: CPT | Mod: CPTII,S$GLB,, | Performed by: ORTHOPAEDIC SURGERY
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
## 2018-11-20 ENCOUNTER — OFFICE VISIT (OUTPATIENT)
Dept: OPTOMETRY | Facility: CLINIC | Age: 53
End: 2018-11-20
Payer: COMMERCIAL

## 2018-11-20 DIAGNOSIS — H02.055 TRICHIASIS OF LEFT LOWER EYELID WITHOUT ENTROPION: Primary | ICD-10-CM

## 2018-11-20 PROCEDURE — 67820 REVISE EYELASHES: CPT | Mod: E2,S$GLB,, | Performed by: OPTOMETRIST

## 2018-11-20 PROCEDURE — 99999 PR PBB SHADOW E&M-EST. PATIENT-LVL I: CPT | Mod: PBBFAC,,, | Performed by: OPTOMETRIST

## 2018-11-20 PROCEDURE — 99499 UNLISTED E&M SERVICE: CPT | Mod: S$GLB,,, | Performed by: OPTOMETRIST

## 2018-11-20 NOTE — PROGRESS NOTES
HPI     Eye Problem      Additional comments: trichiasis -- pt needs lashes pulled LLL              Comments     Recurrent trichiasis LLL          Last edited by MUKUND Virgen, OD on 11/20/2018  1:32 PM. (History)        ROS     Positive for: Eyes    Negative for: Constitutional, Gastrointestinal, Neurological, Skin,   Genitourinary, Musculoskeletal, HENT, Endocrine, Cardiovascular,   Respiratory, Psychiatric, Allergic/Imm, Heme/Lymph    Last edited by MUKUND Virgen, OD on 11/20/2018  1:32 PM. (History)        Assessment /Plan     For exam results, see Encounter Report.    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

## 2019-03-21 DIAGNOSIS — Z12.11 COLON CANCER SCREENING: ICD-10-CM

## 2019-04-05 ENCOUNTER — OFFICE VISIT (OUTPATIENT)
Dept: DERMATOLOGY | Facility: CLINIC | Age: 54
End: 2019-04-05
Payer: COMMERCIAL

## 2019-04-05 DIAGNOSIS — L82.1 SEBORRHEIC KERATOSES: ICD-10-CM

## 2019-04-05 DIAGNOSIS — L82.0 INFLAMED SEBORRHEIC KERATOSIS: Primary | ICD-10-CM

## 2019-04-05 DIAGNOSIS — Z12.83 SCREENING EXAM FOR SKIN CANCER: ICD-10-CM

## 2019-04-05 DIAGNOSIS — D22.9 MULTIPLE BENIGN NEVI: ICD-10-CM

## 2019-04-05 PROCEDURE — 99203 OFFICE O/P NEW LOW 30 MIN: CPT | Mod: 25,S$GLB,, | Performed by: DERMATOLOGY

## 2019-04-05 PROCEDURE — 17004 DESTROY PREMAL LESIONS 15/>: CPT | Mod: S$GLB,,, | Performed by: DERMATOLOGY

## 2019-04-05 PROCEDURE — 99999 PR PBB SHADOW E&M-EST. PATIENT-LVL II: ICD-10-PCS | Mod: PBBFAC,,, | Performed by: DERMATOLOGY

## 2019-04-05 PROCEDURE — 99203 PR OFFICE/OUTPT VISIT, NEW, LEVL III, 30-44 MIN: ICD-10-PCS | Mod: 25,S$GLB,, | Performed by: DERMATOLOGY

## 2019-04-05 PROCEDURE — 17004 PR DESTRUCTION, PREMALIGNANT LESIONS; 15 OR MORE LESIONS: ICD-10-PCS | Mod: S$GLB,,, | Performed by: DERMATOLOGY

## 2019-04-05 PROCEDURE — 99999 PR PBB SHADOW E&M-EST. PATIENT-LVL II: CPT | Mod: PBBFAC,,, | Performed by: DERMATOLOGY

## 2019-04-05 NOTE — PROGRESS NOTES
Subjective:       Patient ID:  Chino Ramirez is a 53 y.o. male who presents for   Chief Complaint   Patient presents with    Lesion     54 y/o M initial visit for lesion under right eye x 3 months,no symptoms. No tx. Spot on left flank x many years,complains of itching and burning. No tx.   No h/o skin cancer. Denies family h/o melanoma.        Review of Systems   Constitutional: Negative for fever, chills and fatigue.   Skin: Positive for itching, dry skin and wears hat. Negative for rash, daily sunscreen use and activity-related sunscreen use.        Objective:    Physical Exam   Constitutional: He appears well-developed and well-nourished. No distress.   Neurological: He is alert and oriented to person, place, and time. He is not disoriented.   Psychiatric: He has a normal mood and affect.   Skin:   Areas Examined (abnormalities noted in diagram):   Scalp / Hair Palpated and Inspected  Head / Face Inspection Performed  Neck Inspection Performed  Chest / Axilla Inspection Performed  Abdomen Inspection Performed  Back Inspection Performed  RUE Inspected  LUE Inspection Performed                   Diagram Legend     Erythematous scaling macule/papule c/w actinic keratosis       Vascular papule c/w angioma      Pigmented verrucoid papule/plaque c/w seborrheic keratosis      Yellow umbilicated papule c/w sebaceous hyperplasia      Irregularly shaped tan macule c/w lentigo     1-2 mm smooth white papules consistent with Milia      Movable subcutaneous cyst with punctum c/w epidermal inclusion cyst      Subcutaneous movable cyst c/w pilar cyst      Firm pink to brown papule c/w dermatofibroma      Pedunculated fleshy papule(s) c/w skin tag(s)      Evenly pigmented macule c/w junctional nevus     Mildly variegated pigmented, slightly irregular-bordered macule c/w mildly atypical nevus      Flesh colored to evenly pigmented papule c/w intradermal nevus       Pink pearly papule/plaque c/w basal cell carcinoma       Erythematous hyperkeratotic cursted plaque c/w SCC      Surgical scar with no sign of skin cancer recurrence      Open and closed comedones      Inflammatory papules and pustules      Verrucoid papule consistent consistent with wart     Erythematous eczematous patches and plaques     Dystrophic onycholytic nail with subungual debris c/w onychomycosis     Umbilicated papule    Erythematous-base heme-crusted tan verrucoid plaque consistent with inflamed seborrheic keratosis     Erythematous Silvery Scaling Plaque c/w Psoriasis     See annotation      Assessment / Plan:        Inflamed seborrheic keratosis  Cryosurgery procedure note:    Verbal consent from the patient is obtained including, but not limited to, risk of hypopigmentation/hyperpigmentation, scar, recurrence of lesion. Liquid nitrogen cryosurgery is applied to 1 lesions to produce a freeze injury. The patient is aware that blisters may form and is instructed on wound care with gentle cleansing and use of vaseline ointment to keep moist until healed. The patient is supplied a handout on cryosurgery and is instructed to call if lesions do not completely resolve.      Seborrheic keratoses  These are benign inherited growths without a malignant potential. Reassurance given to patient. No treatment is necessary.     Multiple benign nevi  total body skin examination performed today including at least 12 points as noted in physical examination. No lesions suspicious for malignancy noted.  Reassurance provided.  Instructed patient to observe lesion(s) for changes and follow up in clinic if changes are noted. Discussed ABCDE's of moles and brochure provided.      Screening exam for skin cancer  Total body skin examination performed today including at least 12 points as noted in physical examination. No lesions suspicious for malignancy noted.  - discussed harmful effects of tanning bed               No follow-ups on file.

## 2019-04-11 ENCOUNTER — PATIENT OUTREACH (OUTPATIENT)
Dept: ADMINISTRATIVE | Facility: HOSPITAL | Age: 54
End: 2019-04-11

## 2019-04-11 NOTE — LETTER
April 11, 2019    Dejon Fong MD             [facility]  [address]  [facility] [address]  Phone: [phone] April 11, 2019     Patient: Chino Ramirez    YOB: 1965   Date of Visit: 4/11/2019       To Whom It May Concern:      [facility]    We are seeing Chino Ramirez in the clinic today at [facility].  Víctor Jansen MD is their PCP.  She/He has an outstanding lab/procedure at this time when reviewing their chart.  To help with our Health Maintenance records will you please supply the following:                                                [x]  Colonoscopy                                             Please Fax to [facility] at [phone]    Thank you for your help, SHEILA Crain.  If I can be of any assistance you can call at [phone]    Sincerely,    Amelia Hadley, Care Coordinator  [facility]  Phone: [phone]  Fax: [phone]

## 2019-04-26 ENCOUNTER — OFFICE VISIT (OUTPATIENT)
Dept: FAMILY MEDICINE | Facility: CLINIC | Age: 54
End: 2019-04-26
Payer: COMMERCIAL

## 2019-04-26 VITALS
HEIGHT: 66 IN | SYSTOLIC BLOOD PRESSURE: 118 MMHG | OXYGEN SATURATION: 97 % | TEMPERATURE: 98 F | RESPIRATION RATE: 16 BRPM | HEART RATE: 68 BPM | WEIGHT: 168.19 LBS | BODY MASS INDEX: 27.03 KG/M2 | DIASTOLIC BLOOD PRESSURE: 64 MMHG

## 2019-04-26 DIAGNOSIS — S83.91XA SPRAIN OF RIGHT KNEE, UNSPECIFIED LIGAMENT, INITIAL ENCOUNTER: ICD-10-CM

## 2019-04-26 DIAGNOSIS — Z00.00 ROUTINE MEDICAL EXAM: Primary | ICD-10-CM

## 2019-04-26 PROCEDURE — 99999 PR PBB SHADOW E&M-EST. PATIENT-LVL III: CPT | Mod: PBBFAC,,, | Performed by: FAMILY MEDICINE

## 2019-04-26 PROCEDURE — 99396 PR PREVENTIVE VISIT,EST,40-64: ICD-10-PCS | Mod: S$GLB,,, | Performed by: FAMILY MEDICINE

## 2019-04-26 PROCEDURE — 99999 PR PBB SHADOW E&M-EST. PATIENT-LVL III: ICD-10-PCS | Mod: PBBFAC,,, | Performed by: FAMILY MEDICINE

## 2019-04-26 PROCEDURE — 99396 PREV VISIT EST AGE 40-64: CPT | Mod: S$GLB,,, | Performed by: FAMILY MEDICINE

## 2019-04-26 NOTE — PROGRESS NOTES
Subjective:       Patient ID: Chino Ramirez is a 53 y.o. male.    Chief Complaint: Annual Exam    HPI     Here for a check up.    C/o right knee pain x 2-3 months.  Triggered by playing basketball and jumping rope.  1 month ago, saw a chiropractor.  Reports that xray of knee was ok.  Has started physio at chiropractor's office over the past 2 weeks.         Review of Systems      Review of Systems   Constitutional: Negative for fever and chills.   HENT: Negative for hearing loss and neck stiffness.    Eyes: Negative for redness and itching.   Respiratory: Negative for cough and choking.    Cardiovascular: Negative for chest pain and leg swelling.  Abdomen: Negative for abdominal pain and blood in stool.   Genitourinary: Negative for dysuria and flank pain.   Musculoskeletal: Negative for back pain and gait problem.   Neurological: Negative for light-headedness and headaches.   Hematological: Negative for adenopathy.   Psychiatric/Behavioral: Negative for behavioral problems.     Objective:      Physical Exam   Constitutional: He is oriented to person, place, and time. He appears well-developed and well-nourished.   HENT:   Head: Normocephalic and atraumatic.   Eyes: Pupils are equal, round, and reactive to light. Conjunctivae are normal.   Neck: Normal range of motion. Neck supple.   Cardiovascular: Normal rate, regular rhythm and normal heart sounds. Exam reveals no friction rub.   No murmur heard.  Pulmonary/Chest: Effort normal and breath sounds normal.   Abdominal: Soft. Bowel sounds are normal. There is no tenderness.   Musculoskeletal: Normal range of motion.   Right knee: non tender joint lines. Good rom   Lymphadenopathy:     He has no cervical adenopathy.   Neurological: He is alert and oriented to person, place, and time. He has normal reflexes. No cranial nerve deficit. Coordination normal.   Skin: Skin is warm and dry.   Psychiatric: He has a normal mood and affect. His behavior is normal.        Assessment:       1. Routine medical exam    2. Sprain of right knee, unspecified ligament, initial encounter        Plan:       Routine medical exam  -     Comprehensive metabolic panel; Future; Expected date: 04/26/2019  -     Lipid panel; Future; Expected date: 04/26/2019  -     PSA, Screening; Future; Expected date: 04/26/2019    Sprain of right knee, unspecified ligament, initial encounter          Plan:  See orders  Cont with therapy at chiropractor's office re: right knee sprain. If no better, pt will see ortho

## 2019-06-04 ENCOUNTER — TELEPHONE (OUTPATIENT)
Dept: ORTHOPEDICS | Facility: CLINIC | Age: 54
End: 2019-06-04

## 2019-06-05 DIAGNOSIS — G89.29 CHRONIC PAIN OF RIGHT KNEE: Primary | ICD-10-CM

## 2019-06-05 DIAGNOSIS — M25.561 CHRONIC PAIN OF RIGHT KNEE: Primary | ICD-10-CM

## 2019-06-06 ENCOUNTER — HOSPITAL ENCOUNTER (OUTPATIENT)
Dept: RADIOLOGY | Facility: HOSPITAL | Age: 54
Discharge: HOME | End: 2019-06-06
Attending: ORTHOPAEDIC SURGERY
Payer: COMMERCIAL

## 2019-06-06 ENCOUNTER — OFFICE VISIT (OUTPATIENT)
Dept: ORTHOPEDICS | Facility: CLINIC | Age: 54
End: 2019-06-06
Payer: COMMERCIAL

## 2019-06-06 VITALS — HEIGHT: 66 IN | WEIGHT: 168 LBS | BODY MASS INDEX: 27 KG/M2

## 2019-06-06 DIAGNOSIS — M17.11 PRIMARY OSTEOARTHRITIS OF RIGHT KNEE: ICD-10-CM

## 2019-06-06 DIAGNOSIS — G89.29 CHRONIC PAIN OF RIGHT KNEE: ICD-10-CM

## 2019-06-06 DIAGNOSIS — M25.561 CHRONIC PAIN OF RIGHT KNEE: ICD-10-CM

## 2019-06-06 DIAGNOSIS — G89.29 CHRONIC PAIN OF RIGHT KNEE: Primary | ICD-10-CM

## 2019-06-06 DIAGNOSIS — M25.561 CHRONIC PAIN OF RIGHT KNEE: Primary | ICD-10-CM

## 2019-06-06 PROCEDURE — 97760 ORTHOTIC MGMT&TRAING 1ST ENC: CPT | Mod: GP,S$GLB,, | Performed by: ORTHOPAEDIC SURGERY

## 2019-06-06 PROCEDURE — 99203 PR OFFICE/OUTPT VISIT, NEW, LEVL III, 30-44 MIN: ICD-10-PCS | Mod: 25,S$GLB,, | Performed by: ORTHOPAEDIC SURGERY

## 2019-06-06 PROCEDURE — 99999 PR PBB SHADOW E&M-EST. PATIENT-LVL II: ICD-10-PCS | Mod: PBBFAC,,, | Performed by: ORTHOPAEDIC SURGERY

## 2019-06-06 PROCEDURE — 3008F PR BODY MASS INDEX (BMI) DOCUMENTED: ICD-10-PCS | Mod: CPTII,S$GLB,, | Performed by: ORTHOPAEDIC SURGERY

## 2019-06-06 PROCEDURE — 3008F BODY MASS INDEX DOCD: CPT | Mod: CPTII,S$GLB,, | Performed by: ORTHOPAEDIC SURGERY

## 2019-06-06 PROCEDURE — 99999 PR PBB SHADOW E&M-EST. PATIENT-LVL II: CPT | Mod: PBBFAC,,, | Performed by: ORTHOPAEDIC SURGERY

## 2019-06-06 PROCEDURE — 73562 XR KNEE ORTHO BILAT: ICD-10-PCS | Mod: 26,,, | Performed by: RADIOLOGY

## 2019-06-06 PROCEDURE — 73562 X-RAY EXAM OF KNEE 3: CPT | Mod: 26,,, | Performed by: RADIOLOGY

## 2019-06-06 PROCEDURE — 99203 OFFICE O/P NEW LOW 30 MIN: CPT | Mod: 25,S$GLB,, | Performed by: ORTHOPAEDIC SURGERY

## 2019-06-06 PROCEDURE — 73562 X-RAY EXAM OF KNEE 3: CPT | Mod: TC,50,PO

## 2019-06-06 PROCEDURE — 97760 PR ORTHOTIC MGMT&TRAINJ INITIAL ENC EA 15 MINS: ICD-10-PCS | Mod: GP,S$GLB,, | Performed by: ORTHOPAEDIC SURGERY

## 2019-06-06 NOTE — PROGRESS NOTES
Dictation #1  MRN:[id number]  CSN:[id number]  Further History  Aching pain  Worse with activity  Relieved with rest  No other associated symptoms  No other radiation    Further Exam  Alert and oriented  Pleasant  Contralateral limb has appropriate range of motion for age and condition  Contralateral limb has appropriate strength for age and condition  Contralateral limb has appropriate stability  for age and condition  No adenopathy  Pulses are appropriate for current condition  Skin is intact        Chief Complaint    Chief Complaint   Patient presents with    Right Knee - Pain       HPI  Chino Ramirez is a 53 y.o.  male who presents with       Past Medical History  Past Medical History:   Diagnosis Date    Allergic rhinitis due to other allergen 8/11/2010    ED (erectile dysfunction)     Follow-up examination, following unspecified surgery     HLD (hyperlipidemia) 1/2/2012    Hyperlipidemia     Hypogonadism male 1/2/2012    Osteoarthrosis, unspecified whether generalized or localized, lower leg 9/18/2014    PONV (postoperative nausea and vomiting)     Tendonitis 2014    right elbow       Past Surgical History  Past Surgical History:   Procedure Laterality Date    ADENOIDECTOMY      ARTHROSCOPY-KNEE Left 9/18/2014    Performed by Esa Lakhani [facility] at [facility]    CARPAL TUNNEL RELEASE      NASAL SINUS SURGERY         Medications  Current Outpatient Medications   Medication Sig    fluticasone (FLONASE) 50 mcg/actuation nasal spray 2 sprays by Each Nare route once daily. (Patient taking differently: 2 sprays by Each Nare route daily as needed. )    multivitamin capsule Take 1 capsule by mouth once daily.     No current facility-administered medications for this visit.        Allergies  Review of patient's allergies indicates:   Allergen Reactions    Cat hair extract      Other reaction(s): Sneezing       Family History  Family History   Problem Relation Age of Onset    No Known Problems Father      Cancer Mother         ovarian    No Known Problems Brother     No Known Problems Daughter     No Known Problems Son     Melanoma Neg Hx     Lupus Neg Hx     Psoriasis Neg Hx     Heart disease Neg Hx     Diabetes Neg Hx     Stroke Neg Hx        Social History  Social History     Socioeconomic History    Marital status: Single     Spouse name: Not on file    Number of children: 2    Years of education: Not on file    Highest education level: Not on file   Occupational History     Employer: [employer]   Social Needs    Financial resource strain: Not on file    Food insecurity:     Worry: Not on file     Inability: Not on file    Transportation needs:     Medical: Not on file     Non-medical: Not on file   Tobacco Use    Smoking status: Never Smoker    Smokeless tobacco: Never Used   Substance and Sexual Activity    Alcohol use: Yes     Comment: Occasionally    Drug use: No    Sexual activity: Yes     Partners: Female   Lifestyle    Physical activity:     Days per week: Not on file     Minutes per session: Not on file    Stress: Not on file   Relationships    Social connections:     Talks on phone: Not on file     Gets together: Not on file     Attends [employer] service: Not on file     Active member of club or organization: Not on file     Attends meetings of clubs or organizations: Not on file     Relationship status: Not on file   Other Topics Concern    Not on file   Social History Narrative    Regular exercise 2-3 days a week, weights and cardio    No dietary restrictions               Review of Systems     Constitutional: Negative    HENT: Negative  Eyes: Negative  Respiratory: Negative  Cardiovascular: Negative  Musculoskeletal: HPI  Skin: Negative  Neurological: Negative  Hematological: Negative  Endocrine: Negative                 Physical Exam    There were no vitals filed for this visit.  Body mass index is 27.12 kg/m².  Physical Examination:     General appearance -  well  appearing, and in no distress  Mental status - awake  Neck - supple  Chest -  symmetric air entry  Heart - normal rate   Abdomen - soft      Assessment     1. Chronic pain of right knee    2. Primary osteoarthritis of right knee          PlanWe performed a custom orthotic/brace fitting, adjusting and training with the patient. The patient demonstrated understanding and proper care. This was performed for 15 minutes.
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
## 2019-06-17 ENCOUNTER — TELEPHONE (OUTPATIENT)
Dept: ORTHOPEDICS | Facility: CLINIC | Age: 54
End: 2019-06-17

## 2019-06-17 DIAGNOSIS — S89.91XD RIGHT KNEE INJURY, SUBSEQUENT ENCOUNTER: ICD-10-CM

## 2019-06-17 DIAGNOSIS — M25.561 ACUTE PAIN OF RIGHT KNEE: Primary | ICD-10-CM

## 2019-06-17 NOTE — TELEPHONE ENCOUNTER
MRI and MRI follow up appt time/date verified.    ----- Message from Siomara Lanza sent at 6/17/2019 12:19 PM CDT -----  Contact: patient  Type: Needs Medical Advice    Who Called:  patient  Symptoms (please be specific):  na  How long has patient had these symptoms:  na  Pharmacy name and phone #:  [email or web address]  Best Call Back Number: [phone]  Additional Information: Patient states that he would like to schedule an MRI.  Please call to advise and schedule. Thanks!

## 2019-06-18 ENCOUNTER — HOSPITAL ENCOUNTER (OUTPATIENT)
Dept: RADIOLOGY | Facility: HOSPITAL | Age: 54
Discharge: HOME | End: 2019-06-18
Attending: ORTHOPAEDIC SURGERY
Payer: COMMERCIAL

## 2019-06-18 DIAGNOSIS — M25.561 ACUTE PAIN OF RIGHT KNEE: ICD-10-CM

## 2019-06-18 DIAGNOSIS — S89.91XD RIGHT KNEE INJURY, SUBSEQUENT ENCOUNTER: ICD-10-CM

## 2019-06-18 PROCEDURE — 73721 MRI KNEE WITHOUT CONTRAST RIGHT: ICD-10-PCS | Mod: 26,RT,, | Performed by: RADIOLOGY

## 2019-06-18 PROCEDURE — 73721 MRI JNT OF LWR EXTRE W/O DYE: CPT | Mod: 26,RT,, | Performed by: RADIOLOGY

## 2019-06-18 PROCEDURE — 73721 MRI JNT OF LWR EXTRE W/O DYE: CPT | Mod: TC,PO,RT

## 2019-07-01 ENCOUNTER — OFFICE VISIT (OUTPATIENT)
Dept: ORTHOPEDICS | Facility: CLINIC | Age: 54
End: 2019-07-01
Payer: COMMERCIAL

## 2019-07-01 VITALS — WEIGHT: 168 LBS | BODY MASS INDEX: 27 KG/M2 | HEIGHT: 66 IN

## 2019-07-01 DIAGNOSIS — M25.561 ACUTE PAIN OF RIGHT KNEE: Primary | ICD-10-CM

## 2019-07-01 DIAGNOSIS — Z71.2 ENCOUNTER TO DISCUSS TEST RESULTS: ICD-10-CM

## 2019-07-01 PROCEDURE — 99999 PR PBB SHADOW E&M-EST. PATIENT-LVL II: ICD-10-PCS | Mod: PBBFAC,,, | Performed by: ORTHOPAEDIC SURGERY

## 2019-07-01 PROCEDURE — 3008F BODY MASS INDEX DOCD: CPT | Mod: CPTII,S$GLB,, | Performed by: ORTHOPAEDIC SURGERY

## 2019-07-01 PROCEDURE — 3008F PR BODY MASS INDEX (BMI) DOCUMENTED: ICD-10-PCS | Mod: CPTII,S$GLB,, | Performed by: ORTHOPAEDIC SURGERY

## 2019-07-01 PROCEDURE — 99999 PR PBB SHADOW E&M-EST. PATIENT-LVL II: CPT | Mod: PBBFAC,,, | Performed by: ORTHOPAEDIC SURGERY

## 2019-07-01 PROCEDURE — 99214 OFFICE O/P EST MOD 30 MIN: CPT | Mod: 57,S$GLB,, | Performed by: ORTHOPAEDIC SURGERY

## 2019-07-01 PROCEDURE — 99214 PR OFFICE/OUTPT VISIT, EST, LEVL IV, 30-39 MIN: ICD-10-PCS | Mod: 57,S$GLB,, | Performed by: ORTHOPAEDIC SURGERY

## 2019-07-01 NOTE — H&P (VIEW-ONLY)
HISTORY OF PRESENT ILLNESS:  A 54 years old, followup MRI of his knee showed   medial meniscal tear.  He is symptomatic now for several months' time, failing   nonoperative course.  He is interested in further intervention.  He had   arthroscopy on the other knee and did well.    ASSESSMENT:  Medial meniscal tear.    PLAN:  I will schedule him for knee arthroscopy.  The patient is aware of the   risks and limitations and still wants to proceed.      PBB/HN  dd: 07/01/2019 15:01:14 (CDT)  td: 07/02/2019 02:22:58 (CDT)  Doc ID   #[id number]  Job ID #[id number]    CC:     Further History  Aching pain  Worse with activity  Relieved with rest  No other associated symptoms  No other radiation    Further Exam  Alert and oriented  Pleasant  Contralateral limb has appropriate range of motion for age and condition  Contralateral limb has appropriate strength for age and condition  Contralateral limb has appropriate stability  for age and condition  No adenopathy  Pulses are appropriate for current condition  Skin is intact        Chief Complaint    Chief Complaint   Patient presents with    Right Knee - Results, Pain       HPI  Chino Ramirez is a 54 y.o.  male who presents with       Past Medical History  Past Medical History:   Diagnosis Date    Allergic rhinitis due to other allergen 8/11/2010    ED (erectile dysfunction)     Follow-up examination, following unspecified surgery     HLD (hyperlipidemia) 1/2/2012    Hyperlipidemia     Hypogonadism male 1/2/2012    Osteoarthrosis, unspecified whether generalized or localized, lower leg 9/18/2014    PONV (postoperative nausea and vomiting)     Tendonitis 2014    right elbow       Past Surgical History  Past Surgical History:   Procedure Laterality Date    ADENOIDECTOMY      ARTHROSCOPY-KNEE Left 9/18/2014    Per[facility]    CARPAL TUNNEL RELEASE      NASAL SINUS SURGERY         Medications  Current Outpatient Medications   Medication  Sig    fluticasone (FLONASE) 50 mcg/actuation nasal spray 2 sprays by Each Nare route once daily. (Patient taking differently: 2 sprays by Each Nare route daily as needed. )    multivitamin capsule Take 1 capsule by mouth once daily.     No current facility-administered medications for this visit.        Allergies  Review of patient's allergies indicates:   Allergen Reactions    Cat hair extract      Other reaction(s): Sneezing       Family History  Family History   Problem Relation Age of Onset    No Known Problems Father     Cancer Mother         ovarian    No Known Problems Brother     No Known Problems Daughter     No Known Problems Son     Melanoma Neg Hx     Lupus Neg Hx     Psoriasis Neg Hx     Heart disease Neg Hx     Diabetes Neg Hx     Stroke Neg Hx        Social History  Social History     Socioeconomic History    Marital status: Single     Spouse name: Not on file    Number of children: 2    Years of education: Not on file    Highest education level: Not on file   Occupational History     Employer: [employer]   Social Needs    Financial resource strain: Not on file    Food insecurity:     Worry: Not on file     Inability: Not on file    Transportation needs:     Medical: Not on file     Non-medical: Not on file   Tobacco Use    Smoking status: Never Smoker    Smokeless tobacco: Never Used   Substance and Sexual Activity    Alcohol use: Yes     Comment: Occasionally    Drug use: No    Sexual activity: Yes     Partners: Female   Lifestyle    Physical activity:     Days per week: Not on file     Minutes per session: Not on file    Stress: Not on file   Relationships    Social connections:     Talks on phone: Not on file     Gets together: Not on file     Attends Judaism service: Not on file     Active member of club or organization: Not on file     Attends meetings of clubs or organizations: Not on file     Relationship status: Not on file   Other Topics Concern    Not on  file   Social History Narrative    Regular exercise 2-3 days a week, weights and cardio    No dietary restrictions               Review of Systems     Constitutional: Negative    HENT: Negative  Eyes: Negative  Respiratory: Negative  Cardiovascular: Negative  Musculoskeletal: HPI  Skin: Negative  Neurological: Negative  Hematological: Negative  Endocrine: Negative                 Physical Exam    There were no vitals filed for this visit.  Body mass index is 27.12 kg/m².  Physical Examination:     General appearance -  well appearing, and in no distress  Mental status - awake  Neck - supple  Chest -  symmetric air entry  Heart - normal rate   Abdomen - soft      Assessment     1. Acute pain of right knee    2. Encounter to discuss test results          Plan

## 2019-07-01 NOTE — PROGRESS NOTES
HISTORY OF PRESENT ILLNESS:  A 54 years old, followup MRI of his knee showed   medial meniscal tear.  He is symptomatic now for several months' time, failing   nonoperative course.  He is interested in further intervention.  He had   arthroscopy on the other knee and did well.    ASSESSMENT:  Medial meniscal tear.    PLAN:  I will schedule him for knee arthroscopy.  The patient is aware of the   risks and limitations and still wants to proceed.      PBB/HN  dd: 07/01/2019 15:01:14 (CDT)  td: 07/02/2019 02:22:58 (CDT)  Doc ID   #[id number]  Job ID #[id number]    CC:     Further History  Aching pain  Worse with activity  Relieved with rest  No other associated symptoms  No other radiation    Further Exam  Alert and oriented  Pleasant  Contralateral limb has appropriate range of motion for age and condition  Contralateral limb has appropriate strength for age and condition  Contralateral limb has appropriate stability  for age and condition  No adenopathy  Pulses are appropriate for current condition  Skin is intact        Chief Complaint    Chief Complaint   Patient presents with    Right Knee - Results, Pain       HPI  Chino Ramirez is a 54 y.o.  male who presents with       Past Medical History  Past Medical History:   Diagnosis Date    Allergic rhinitis due to other allergen 8/11/2010    ED (erectile dysfunction)     Follow-up examination, following unspecified surgery     HLD (hyperlipidemia) 1/2/2012    Hyperlipidemia     Hypogonadism male 1/2/2012    Osteoarthrosis, unspecified whether generalized or localized, lower leg 9/18/2014    PONV (postoperative nausea and vomiting)     Tendonitis 2014    right elbow       Past Surgical History  Past Surgical History:   Procedure Laterality Date    ADENOIDECTOMY      ARTHROSCOPY-KNEE Left 9/18/2014    [facility]    CARPAL TUNNEL RELEASE      NASAL SINUS SURGERY         Medications  Current Outpatient Medications   Medication  Sig    fluticasone (FLONASE) 50 mcg/actuation nasal spray 2 sprays by Each Nare route once daily. (Patient taking differently: 2 sprays by Each Nare route daily as needed. )    multivitamin capsule Take 1 capsule by mouth once daily.     No current facility-administered medications for this visit.        Allergies  Review of patient's allergies indicates:   Allergen Reactions    Cat hair extract      Other reaction(s): Sneezing       Family History  Family History   Problem Relation Age of Onset    No Known Problems Father     Cancer Mother         ovarian    No Known Problems Brother     No Known Problems Daughter     No Known Problems Son     Melanoma Neg Hx     Lupus Neg Hx     Psoriasis Neg Hx     Heart disease Neg Hx     Diabetes Neg Hx     Stroke Neg Hx        Social History  Social History     Socioeconomic History    Marital status: Single     Spouse name: Not on file    Number of children: 2    Years of education: Not on file    Highest education level: Not on file   Occupational History     Employer: [employer]   Social Needs    Financial resource strain: Not on file    Food insecurity:     Worry: Not on file     Inability: Not on file    Transportation needs:     Medical: Not on file     Non-medical: Not on file   Tobacco Use    Smoking status: Never Smoker    Smokeless tobacco: Never Used   Substance and Sexual Activity    Alcohol use: Yes     Comment: Occasionally    Drug use: No    Sexual activity: Yes     Partners: Female   Lifestyle    Physical activity:     Days per week: Not on file     Minutes per session: Not on file    Stress: Not on file   Relationships    Social connections:     Talks on phone: Not on file     Gets together: Not on file     Attends Mormonism service: Not on file     Active member of club or organization: Not on file     Attends meetings of clubs or organizations: Not on file     Relationship status: Not on file   Other Topics Concern    Not on  file   Social History Narrative    Regular exercise 2-3 days a week, weights and cardio    No dietary restrictions               Review of Systems     Constitutional: Negative    HENT: Negative  Eyes: Negative  Respiratory: Negative  Cardiovascular: Negative  Musculoskeletal: HPI  Skin: Negative  Neurological: Negative  Hematological: Negative  Endocrine: Negative                 Physical Exam    There were no vitals filed for this visit.  Body mass index is 27.12 kg/m².  Physical Examination:     General appearance -  well appearing, and in no distress  Mental status - awake  Neck - supple  Chest -  symmetric air entry  Heart - normal rate   Abdomen - soft      Assessment     1. Acute pain of right knee    2. Encounter to discuss test results          Plan

## 2019-07-03 ENCOUNTER — TELEPHONE (OUTPATIENT)
Dept: ORTHOPEDICS | Facility: CLINIC | Age: 54
End: 2019-07-03

## 2019-07-03 NOTE — TELEPHONE ENCOUNTER
Informed patient surgery date is to close for him to receive an injection. Offered patient topical pain relief cream to help with discomforts. Patient states he would like to try topical cream. Informed patient once insurance has approved medication it will be delivered to his home. Thanks and understanding verbalized----- Message from Shira Stewart sent at 7/3/2019 12:52 PM CDT -----  Contact: [phone]  Patient is requesting a call back from the nurse stated he's in a lot of pain.  Patient requesting injection until his surgery.    Please call the patient upon request at phone number [phone].

## 2019-07-08 DIAGNOSIS — S83.241A OTHER TEAR OF MEDIAL MENISCUS OF RIGHT KNEE AS CURRENT INJURY, INITIAL ENCOUNTER: ICD-10-CM

## 2019-07-08 DIAGNOSIS — S83.241A ACUTE MENISCAL TEAR, MEDIAL, RIGHT, INITIAL ENCOUNTER: ICD-10-CM

## 2019-07-08 DIAGNOSIS — M25.561 ACUTE PAIN OF RIGHT KNEE: Primary | ICD-10-CM

## 2019-07-08 RX ORDER — SODIUM CHLORIDE 9 MG/ML
INJECTION, SOLUTION INTRAVENOUS CONTINUOUS
Status: CANCELLED | OUTPATIENT
Start: 2019-07-08

## 2019-07-16 DIAGNOSIS — Z98.890 S/P ARTHROSCOPY OF RIGHT KNEE: Primary | ICD-10-CM

## 2019-07-17 ENCOUNTER — ANESTHESIA EVENT (OUTPATIENT)
Dept: SURGERY | Facility: HOSPITAL | Age: 54
End: 2019-07-17
Payer: COMMERCIAL

## 2019-07-17 RX ORDER — OXYCODONE AND ACETAMINOPHEN 5; 325 MG/1; MG/1
1 TABLET ORAL
Qty: 42 TABLET | Refills: 0 | Status: SHIPPED | OUTPATIENT
Start: 2019-07-17 | End: 2019-10-22

## 2019-07-18 ENCOUNTER — ANESTHESIA (OUTPATIENT)
Dept: SURGERY | Facility: HOSPITAL | Age: 54
End: 2019-07-18
Payer: COMMERCIAL

## 2019-07-18 ENCOUNTER — HOSPITAL ENCOUNTER (OUTPATIENT)
Facility: HOSPITAL | Age: 54
Discharge: HOME | End: 2019-07-18
Attending: ORTHOPAEDIC SURGERY | Admitting: ORTHOPAEDIC SURGERY
Payer: COMMERCIAL

## 2019-07-18 VITALS
DIASTOLIC BLOOD PRESSURE: 75 MMHG | BODY MASS INDEX: 26.84 KG/M2 | OXYGEN SATURATION: 99 % | SYSTOLIC BLOOD PRESSURE: 117 MMHG | RESPIRATION RATE: 10 BRPM | HEIGHT: 66 IN | WEIGHT: 167 LBS | TEMPERATURE: 97 F | HEART RATE: 73 BPM

## 2019-07-18 DIAGNOSIS — S83.241A OTHER TEAR OF MEDIAL MENISCUS OF RIGHT KNEE AS CURRENT INJURY, INITIAL ENCOUNTER: ICD-10-CM

## 2019-07-18 DIAGNOSIS — S83.241A ACUTE MENISCAL TEAR, MEDIAL, RIGHT, INITIAL ENCOUNTER: ICD-10-CM

## 2019-07-18 DIAGNOSIS — M25.561 ACUTE PAIN OF RIGHT KNEE: ICD-10-CM

## 2019-07-18 PROCEDURE — 25000003 PHARM REV CODE 250: Mod: PO | Performed by: ORTHOPAEDIC SURGERY

## 2019-07-18 PROCEDURE — 29881 PR KNEE SCOPE SINGLE MENISECECTOMY: ICD-10-PCS | Mod: RT,,, | Performed by: ORTHOPAEDIC SURGERY

## 2019-07-18 PROCEDURE — 25000003 PHARM REV CODE 250: Mod: PO | Performed by: ANESTHESIOLOGY

## 2019-07-18 PROCEDURE — 27201423 OPTIME MED/SURG SUP & DEVICES STERILE SUPPLY: Mod: PO | Performed by: ORTHOPAEDIC SURGERY

## 2019-07-18 PROCEDURE — D9220A PRA ANESTHESIA: ICD-10-PCS | Mod: CRNA,,, | Performed by: NURSE ANESTHETIST, CERTIFIED REGISTERED

## 2019-07-18 PROCEDURE — 63600175 PHARM REV CODE 636 W HCPCS: Mod: PO | Performed by: ORTHOPAEDIC SURGERY

## 2019-07-18 PROCEDURE — 25000003 PHARM REV CODE 250: Mod: PO | Performed by: NURSE ANESTHETIST, CERTIFIED REGISTERED

## 2019-07-18 PROCEDURE — 36000711: Mod: PO | Performed by: ORTHOPAEDIC SURGERY

## 2019-07-18 PROCEDURE — D9220A PRA ANESTHESIA: Mod: CRNA,,, | Performed by: NURSE ANESTHETIST, CERTIFIED REGISTERED

## 2019-07-18 PROCEDURE — 71000033 HC RECOVERY, INTIAL HOUR: Mod: PO | Performed by: ORTHOPAEDIC SURGERY

## 2019-07-18 PROCEDURE — 63600175 PHARM REV CODE 636 W HCPCS: Mod: PO | Performed by: NURSE ANESTHETIST, CERTIFIED REGISTERED

## 2019-07-18 PROCEDURE — 27200651 HC AIRWAY, LMA: Mod: PO | Performed by: NURSE ANESTHETIST, CERTIFIED REGISTERED

## 2019-07-18 PROCEDURE — 71000015 HC POSTOP RECOV 1ST HR: Mod: PO | Performed by: ORTHOPAEDIC SURGERY

## 2019-07-18 PROCEDURE — D9220A PRA ANESTHESIA: ICD-10-PCS | Mod: ANES,,, | Performed by: ANESTHESIOLOGY

## 2019-07-18 PROCEDURE — S0028 INJECTION, FAMOTIDINE, 20 MG: HCPCS | Mod: PO | Performed by: NURSE ANESTHETIST, CERTIFIED REGISTERED

## 2019-07-18 PROCEDURE — 37000009 HC ANESTHESIA EA ADD 15 MINS: Mod: PO | Performed by: ORTHOPAEDIC SURGERY

## 2019-07-18 PROCEDURE — D9220A PRA ANESTHESIA: Mod: ANES,,, | Performed by: ANESTHESIOLOGY

## 2019-07-18 PROCEDURE — 29881 ARTHRS KNE SRG MNISECTMY M/L: CPT | Mod: RT,,, | Performed by: ORTHOPAEDIC SURGERY

## 2019-07-18 PROCEDURE — 36000710: Mod: PO | Performed by: ORTHOPAEDIC SURGERY

## 2019-07-18 PROCEDURE — 37000008 HC ANESTHESIA 1ST 15 MINUTES: Mod: PO | Performed by: ORTHOPAEDIC SURGERY

## 2019-07-18 RX ORDER — PROPOFOL 10 MG/ML
VIAL (ML) INTRAVENOUS
Status: DISCONTINUED | OUTPATIENT
Start: 2019-07-18 | End: 2019-07-18

## 2019-07-18 RX ORDER — FENTANYL CITRATE 50 UG/ML
INJECTION, SOLUTION INTRAMUSCULAR; INTRAVENOUS
Status: DISCONTINUED | OUTPATIENT
Start: 2019-07-18 | End: 2019-07-18

## 2019-07-18 RX ORDER — OXYCODONE HYDROCHLORIDE 5 MG/1
5 TABLET ORAL ONCE AS NEEDED
Status: COMPLETED | OUTPATIENT
Start: 2019-07-18 | End: 2019-07-18

## 2019-07-18 RX ORDER — SODIUM CHLORIDE 9 MG/ML
INJECTION, SOLUTION INTRAVENOUS CONTINUOUS
Status: DISCONTINUED | OUTPATIENT
Start: 2019-07-18 | End: 2019-07-18 | Stop reason: HOSPADM

## 2019-07-18 RX ORDER — SCOLOPAMINE TRANSDERMAL SYSTEM 1 MG/1
1 PATCH, EXTENDED RELEASE TRANSDERMAL
Status: DISCONTINUED | OUTPATIENT
Start: 2019-07-18 | End: 2019-07-18 | Stop reason: HOSPADM

## 2019-07-18 RX ORDER — ONDANSETRON 2 MG/ML
INJECTION INTRAMUSCULAR; INTRAVENOUS
Status: DISCONTINUED | OUTPATIENT
Start: 2019-07-18 | End: 2019-07-18

## 2019-07-18 RX ORDER — MIDAZOLAM HYDROCHLORIDE 1 MG/ML
INJECTION, SOLUTION INTRAMUSCULAR; INTRAVENOUS
Status: DISCONTINUED | OUTPATIENT
Start: 2019-07-18 | End: 2019-07-18

## 2019-07-18 RX ORDER — SODIUM CHLORIDE, SODIUM LACTATE, POTASSIUM CHLORIDE, CALCIUM CHLORIDE 600; 310; 30; 20 MG/100ML; MG/100ML; MG/100ML; MG/100ML
INJECTION, SOLUTION INTRAVENOUS CONTINUOUS
Status: DISCONTINUED | OUTPATIENT
Start: 2019-07-18 | End: 2019-07-18 | Stop reason: HOSPADM

## 2019-07-18 RX ORDER — EPINEPHRINE 1 MG/ML
INJECTION INTRAMUSCULAR; INTRAVENOUS; SUBCUTANEOUS
Status: DISCONTINUED | OUTPATIENT
Start: 2019-07-18 | End: 2019-07-18 | Stop reason: HOSPADM

## 2019-07-18 RX ORDER — SODIUM CHLORIDE, SODIUM LACTATE, POTASSIUM CHLORIDE, CALCIUM CHLORIDE 600; 310; 30; 20 MG/100ML; MG/100ML; MG/100ML; MG/100ML
125 INJECTION, SOLUTION INTRAVENOUS CONTINUOUS
Status: DISCONTINUED | OUTPATIENT
Start: 2019-07-18 | End: 2019-07-18 | Stop reason: HOSPADM

## 2019-07-18 RX ORDER — BUPIVACAINE HYDROCHLORIDE 2.5 MG/ML
INJECTION, SOLUTION EPIDURAL; INFILTRATION; INTRACAUDAL
Status: DISCONTINUED | OUTPATIENT
Start: 2019-07-18 | End: 2019-07-18 | Stop reason: HOSPADM

## 2019-07-18 RX ORDER — LIDOCAINE HYDROCHLORIDE 10 MG/ML
1 INJECTION, SOLUTION EPIDURAL; INFILTRATION; INTRACAUDAL; PERINEURAL ONCE
Status: DISCONTINUED | OUTPATIENT
Start: 2019-07-18 | End: 2019-07-18 | Stop reason: HOSPADM

## 2019-07-18 RX ORDER — ONDANSETRON 2 MG/ML
4 INJECTION INTRAMUSCULAR; INTRAVENOUS ONCE AS NEEDED
Status: DISCONTINUED | OUTPATIENT
Start: 2019-07-18 | End: 2019-07-18 | Stop reason: HOSPADM

## 2019-07-18 RX ORDER — ACETAMINOPHEN 10 MG/ML
INJECTION, SOLUTION INTRAVENOUS
Status: DISCONTINUED | OUTPATIENT
Start: 2019-07-18 | End: 2019-07-18

## 2019-07-18 RX ORDER — KETOROLAC TROMETHAMINE 30 MG/ML
INJECTION, SOLUTION INTRAMUSCULAR; INTRAVENOUS
Status: DISCONTINUED | OUTPATIENT
Start: 2019-07-18 | End: 2019-07-18

## 2019-07-18 RX ORDER — CEFAZOLIN SODIUM 2 G/50ML
2 SOLUTION INTRAVENOUS
Status: COMPLETED | OUTPATIENT
Start: 2019-07-18 | End: 2019-07-18

## 2019-07-18 RX ORDER — DEXAMETHASONE SODIUM PHOSPHATE 4 MG/ML
INJECTION, SOLUTION INTRA-ARTICULAR; INTRALESIONAL; INTRAMUSCULAR; INTRAVENOUS; SOFT TISSUE
Status: DISCONTINUED | OUTPATIENT
Start: 2019-07-18 | End: 2019-07-18

## 2019-07-18 RX ORDER — FAMOTIDINE 10 MG/ML
INJECTION INTRAVENOUS
Status: DISCONTINUED | OUTPATIENT
Start: 2019-07-18 | End: 2019-07-18

## 2019-07-18 RX ORDER — LIDOCAINE HCL/PF 100 MG/5ML
SYRINGE (ML) INTRAVENOUS
Status: DISCONTINUED | OUTPATIENT
Start: 2019-07-18 | End: 2019-07-18

## 2019-07-18 RX ADMIN — ONDANSETRON 4 MG: 2 INJECTION, SOLUTION INTRAMUSCULAR; INTRAVENOUS at 07:07

## 2019-07-18 RX ADMIN — OXYCODONE HYDROCHLORIDE 5 MG: 5 TABLET ORAL at 08:07

## 2019-07-18 RX ADMIN — ACETAMINOPHEN 1000 MG: 10 INJECTION, SOLUTION INTRAVENOUS at 08:07

## 2019-07-18 RX ADMIN — MIDAZOLAM HYDROCHLORIDE 2 MG: 1 INJECTION, SOLUTION INTRAMUSCULAR; INTRAVENOUS at 07:07

## 2019-07-18 RX ADMIN — PROPOFOL 100 MG: 10 INJECTION, EMULSION INTRAVENOUS at 07:07

## 2019-07-18 RX ADMIN — FAMOTIDINE 20 MG: 10 INJECTION INTRAVENOUS at 07:07

## 2019-07-18 RX ADMIN — LIDOCAINE HYDROCHLORIDE 100 MG: 20 INJECTION PARENTERAL at 07:07

## 2019-07-18 RX ADMIN — FENTANYL CITRATE 25 MCG: 50 INJECTION, SOLUTION INTRAMUSCULAR; INTRAVENOUS at 08:07

## 2019-07-18 RX ADMIN — FENTANYL CITRATE 50 MCG: 50 INJECTION, SOLUTION INTRAMUSCULAR; INTRAVENOUS at 07:07

## 2019-07-18 RX ADMIN — KETOROLAC TROMETHAMINE 30 MG: 30 INJECTION, SOLUTION INTRAMUSCULAR; INTRAVENOUS at 08:07

## 2019-07-18 RX ADMIN — SCOPALAMINE 1 PATCH: 1 PATCH, EXTENDED RELEASE TRANSDERMAL at 07:07

## 2019-07-18 RX ADMIN — CEFAZOLIN SODIUM 2 G: 2 SOLUTION INTRAVENOUS at 07:07

## 2019-07-18 RX ADMIN — DEXAMETHASONE SODIUM PHOSPHATE 8 MG: 4 INJECTION, SOLUTION INTRAMUSCULAR; INTRAVENOUS at 07:07

## 2019-07-18 RX ADMIN — SODIUM CHLORIDE, SODIUM LACTATE, POTASSIUM CHLORIDE, AND CALCIUM CHLORIDE: .6; .31; .03; .02 INJECTION, SOLUTION INTRAVENOUS at 07:07

## 2019-07-18 RX ADMIN — PROPOFOL 200 MG: 10 INJECTION, EMULSION INTRAVENOUS at 07:07

## 2019-07-18 NOTE — OP NOTE
DATE OF PROCEDURE:  07/18/2019.    PREOPERATIVE DIAGNOSES:  Right knee pain, right medial meniscal tear.    POSTOPERATIVE DIAGNOSIS:  Right knee pain, right medial meniscal tear.    PROCEDURE:  Right knee arthroscopy with partial medial meniscectomy.    SURGEON:  Esa Lakhani M.D.    ASSISTANT:  None.    ANESTHESIA:  General.    ESTIMATED BLOOD LOSS:  None.    FLUIDS:  Crystalloid.    DRAINS:  None.    INDICATIONS FOR PROCEDURE:  Mr. Ramirez is a 54-year-old who has had pain in his   right knee despite nonoperative measures and interested in knee arthroscopy.    PROCEDURE IN DETAIL:  After obtaining informed consent and starting the patient   on preoperative IV antibiotics, the patient was taken back to the Operating   Room.  General anesthesia was performed by Anesthesia team.  Right lower   extremity was prepped and draped in normal sterile fashion.  After 10 minutes of   elevation, the pneumatic tourniquet was inflated to 300 mmHg.  Standard   inferior lateral portal was established.  Arthroscope was introduced into the   knee.  Patellofemoral compartment showed areas of grade I and II chondrosis, but   overall looked healthy.  No loose bodies in the lateral gutter.  I went down   the medial compartment, localized the medial portal with spinal needle,   established the medial portal, inspected the medial compartment.  Articular   surface looked relatively healthy.  There was a degenerative type tear of the   posterior horn of the medial meniscus that we debrided down with a shaver to a   smooth stable rim, went to the notch.  ACL was intact.  Lateral compartment   looked healthy with no meniscal or chondral pathology.  We then spent some time   running saline solution to the knee.  We evacuated the saline solution, closed   portal sites with 4-0 nylon, injected the knee with 0.25% Marcaine plain.    Sterile dressing was applied.  Tourniquet was deflated.        VAHE/VESTA  dd: 07/18/2019 08:28:20 (CDT)  td:  07/18/2019 08:41:30 (CDT)  Doc ID   #[id number]  Job ID #[id number]    CC:

## 2019-07-18 NOTE — DISCHARGE INSTRUCTIONS
Discharge Instructions: After Your Surgery  Youve just had surgery. During surgery, you were given medicine called anesthesia to keep you relaxed and free of pain. After surgery, you may have some pain or nausea. This is common. Here are some tips for feeling better and getting well after surgery.     Stay on schedule with your medicine.   Going home  Your healthcare provider will show you how to take care of yourself when you go home. He or she will also answer your questions. Have an adult family member or friend drive you home. For the first 24 hours after your surgery:  · Do not drive or use heavy equipment.  · Do not make important decisions or sign legal papers.  · Do not drink alcohol.  · Have someone stay with you, if needed. He or she can watch for problems and help keep you safe.  Be sure to go to all follow-up visits with your healthcare provider. And rest after your surgery for as long as your healthcare provider tells you to.  Coping with pain  If you have pain after surgery, pain medicine will help you feel better. Take it as told, before pain becomes severe. Also, ask your healthcare provider or pharmacist about other ways to control pain. This might be with heat, ice, or relaxation. And follow any other instructions your surgeon or nurse gives you.  Tips for taking pain medicine  To get the best relief possible, remember these points:  · Pain medicines can upset your stomach. Taking them with a little food may help.  · Most pain relievers taken by mouth need at least 20 to 30 minutes to start to work.  · Taking medicine on a schedule can help you remember to take it. Try to time your medicine so that you can take it before starting an activity. This might be before you get dressed, go for a walk, or sit down for dinner.  · Constipation is a common side effect of pain medicines. Call your healthcare provider before taking any medicines such as laxatives or stool softeners to help ease constipation.  Also ask if you should skip any foods. Drinking lots of fluids and eating foods such as fruits and vegetables that are high in fiber can also help. Remember, do not take laxatives unless your surgeon has prescribed them.  · Drinking alcohol and taking pain medicine can cause dizziness and slow your breathing. It can even be deadly. Do not drink alcohol while taking pain medicine.  · Pain medicine can make you react more slowly to things. Do not drive or run machinery while taking pain medicine.  Your healthcare provider may tell you to take acetaminophen to help ease your pain. Ask him or her how much you are supposed to take each day. Acetaminophen or other pain relievers may interact with your prescription medicines or other over-the-counter (OTC) medicines. Some prescription medicines have acetaminophen and other ingredients. Using both prescription and OTC acetaminophen for pain can cause you to overdose. Read the labels on your OTC medicines with care. This will help you to clearly know the list of ingredients, how much to take, and any warnings. It may also help you not take too much acetaminophen. If you have questions or do not understand the information, ask your pharmacist or healthcare provider to explain it to you before you take the OTC medicine.  Managing nausea  Some people have an upset stomach after surgery. This is often because of anesthesia, pain, or pain medicine, or the stress of surgery. These tips will help you handle nausea and eat healthy foods as you get better. If you were on a special food plan before surgery, ask your healthcare provider if you should follow it while you get better. These tips may help:  · Do not push yourself to eat. Your body will tell you when to eat and how much.  · Start off with clear liquids and soup. They are easier to digest.  · Next try semi-solid foods, such as mashed potatoes, applesauce, and gelatin, as you feel ready.  · Slowly move to solid foods. Dont  eat fatty, rich, or spicy foods at first.  · Do not force yourself to have 3 large meals a day. Instead eat smaller amounts more often.  · Take pain medicines with a small amount of solid food, such as crackers or toast, to avoid nausea.     Call your surgeon if  · You still have pain an hour after taking medicine. The medicine may not be strong enough.  · You feel too sleepy, dizzy, or groggy. The medicine may be too strong.  · You have side effects like nausea, vomiting, or skin changes, such as rash, itching, or hives.       If you have obstructive sleep apnea  You were given anesthesia medicine during surgery to keep you comfortable and free of pain. After surgery, you may have more apnea spells because of this medicine and other medicines you were given. The spells may last longer than usual.   At home:  · Keep using the continuous positive airway pressure (CPAP) device when you sleep. Unless your healthcare provider tells you not to, use it when you sleep, day or night. CPAP is a common device used to treat obstructive sleep apnea.  · Talk with your provider before taking any pain medicine, muscle relaxants, or sedatives. Your provider will tell you about the possible dangers of taking these medicines.  Date Last Reviewed: 12/1/2016 © 2000-2017 [employer]. [address] [place] [address]. All rights reserved. This information is not intended as a substitute for professional medical care. Always follow your healthcare professional's instructions.            After Knee Arthroscopy   After surgery, your joint may be swollen, painful, and stiff. Your recovery time will depend on what was done. Your surgeon will tell you when to resume activity and weight bearing. If you had meniscal cartilage or loose bodies removed, you may be told to bear weight early on. After ACL repair, do not pivot or make sudden moves.      You may be told to ride an exercise bike daily. This will help restore  your knee's flexibility and strength.      At Home   Follow your surgeons guidelines for healing:   Elevate and ice your knee.   Keep your knee bandaged.   Take pain medication as directed.  The checklist below helps remind you what to do after arthroscopy.   Schedule your first follow-up visit for 14 days after surgery.   May remove dressing and shower in two days. Afterward, apply bandaids to incision sites.   Complete your physical therapy program.   You can do these activities right away:   Bear weight as tolerated, use crutches as needed.      © 8533-5048 [employer], [address] [place] [address], [place] [address]. All rights reserved. This information is not intended as a substitute for professional medical care. Always follow your healthcare professional's instructions.

## 2019-07-18 NOTE — TRANSFER OF CARE
"Anesthesia Transfer of Care Note    Patient: Chino Ramirez    Procedure(s) Performed: Procedure(s) (LRB):  ARTHROSCOPY, KNEE (Right)  MENISCECTOMY, KNEE, MEDIAL (Right)    Patient location: PACU    Anesthesia Type: general    Transport from OR: Transported from OR on room air with adequate spontaneous ventilation    Post pain: adequate analgesia    Post assessment: no apparent anesthetic complications    Post vital signs: stable    Level of consciousness: awake and sedated    Nausea/Vomiting: no nausea/vomiting    Complications: none    Transfer of care protocol was followed      Last vitals:   Visit Vitals  BP (!) 107/55 (BP Location: Left arm, Patient Position: Lying)   Pulse 66   Temp 36.1 °C (97 °F) (Skin)   Resp 12   Ht 5' 6" (1.676 m)   Wt 75.8 kg (167 lb)   SpO2 96%   BMI 26.95 kg/m²     "

## 2019-07-18 NOTE — INTERVAL H&P NOTE
The patient has been examined and the H&P has been reviewed:    I concur with the findings and no changes have occurred since H&P was written.    Anesthesia/Surgery risks, benefits and alternative options discussed and understood by patient/family.          Active Hospital Problems    Diagnosis  POA    Acute meniscal tear, medial, right, initial encounter [S81.620A]  Yes      Resolved Hospital Problems   No resolved problems to display.

## 2019-07-18 NOTE — ANESTHESIA POSTPROCEDURE EVALUATION
Anesthesia Post Evaluation    Patient: Chino Ramirez    Procedure(s) Performed: Procedure(s) (LRB):  ARTHROSCOPY, KNEE (Right)  MENISCECTOMY, KNEE, MEDIAL (Right)    Final Anesthesia Type: general  Patient location during evaluation: PACU  Patient participation: Yes- Able to Participate  Level of consciousness: awake and alert  Post-procedure vital signs: reviewed and stable  Pain management: adequate  Airway patency: patent  PONV status at discharge: No PONV  Anesthetic complications: no      Cardiovascular status: hemodynamically stable  Respiratory status: unassisted and room air  Hydration status: euvolemic  Follow-up not needed.          Vitals Value Taken Time   /75 7/18/2019  9:20 AM   Temp 36.1 °C (97 °F) 7/18/2019  8:20 AM   Pulse 73 7/18/2019  9:20 AM   Resp 10 7/18/2019  9:20 AM   SpO2 99 % 7/18/2019  9:20 AM         Event Time     Out of Recovery 09:20:00          Pain/Jan Score: Pain Rating Prior to Med Admin: 5 (7/18/2019  9:00 AM)  Jan Score: 10 (7/18/2019  9:52 AM)

## 2019-07-18 NOTE — BRIEF OP NOTE
[facility]  Brief Operative Note     SUMMARY     Surgery Date: 7/18/2019     Surgeon(s) and Role:     * Esa Lakhani MD - Primary    Assistant: none    Pre-op Diagnosis:  Acute pain of right knee [M25.561]  Other tear of medial meniscus of right knee as current injury, initial encounter [S83.241A]    Post-op Diagnosis:  Acute pain of right knee [M25.561]  Other tear of medial meniscus of right knee as current injury, initial encounter [S83.241A]    Procedure(s) (LRB):  ARTHROSCOPY, KNEE (Right)  MENISCECTOMY, KNEE, MEDIAL (Right)      Description of the findings of the procedure:  Acute pain of right knee [M25.561]  Other tear of medial meniscus of right knee as current injury, initial encounter [S83.241A]      Estimated Blood Loss: < 20CC         Specimens:   Specimen (12h ago, onward)    None          Discharge Note    SUMMARY     Admit Date: 7/18/2019    Discharge Date and Time: No discharge date for patient encounter.    Attending Physician: Esa Lakhani MD     Discharge Provider: Esa Lakhani    Final Diagnosis :Acute pain of right knee [M25.561]  Other tear of medial meniscus of right knee as current injury, initial encounter [S83.241A]    Outcome of Hospitalization, Treatment, Procedure, or Surgery:  Patient was admitted for an outpatient procedure and tolerated it well without complications.    Disposition: Home or Self care    Follow Up/Patient Instructions: The patient will be discharged home after meeting all discharge criteria. The patient will resume a diet as tolerated. Please follow post-operative instructions for activity restrictions. Keep previously planned post-operative follow -up appointment.      No discharge procedures on file.

## 2019-07-18 NOTE — ANESTHESIA PREPROCEDURE EVALUATION
07/18/2019  Chino Ramirez is a 54 y.o., male.    Anesthesia Evaluation    I have reviewed the Patient Summary Reports.    I have reviewed the Nursing Notes.   I have reviewed the Medications.     Review of Systems  Anesthesia Hx:  Hx of Anesthetic complications PONV   Social:  Non-Smoker    EENT/Dental:   chronic allergic rhinitis   Cardiovascular:   hyperlipidemia    Musculoskeletal:   Arthritis  Medial meniscus tear of right knee        Physical Exam  General:  Well nourished    Airway/Jaw/Neck:  Airway Findings: Mouth Opening: Normal Tongue: Normal  General Airway Assessment: Adult  Mallampati: I  TM Distance: Normal, at least 6 cm        Eyes/Ears/Nose:  EYES/EARS/NOSE FINDINGS: Normal   Dental:  DENTAL FINDINGS: Normal   Chest/Lungs:  Chest/Lungs Findings: Clear to auscultation, Normal Respiratory Rate     Heart/Vascular:  Heart Findings: Rate: Normal  Rhythm: Regular Rhythm        Mental Status:  Mental Status Findings:  Cooperative, Alert and Oriented         Anesthesia Plan  Type of Anesthesia, risks & benefits discussed:  Anesthesia Type:  general  Patient's Preference:   Intra-op Monitoring Plan: standard ASA monitors  Intra-op Monitoring Plan Comments:   Post Op Pain Control Plan: IV/PO Opioids PRN  Post Op Pain Control Plan Comments:   Induction:   IV  Beta Blocker:  Patient is not currently on a Beta-Blocker (No further documentation required).       Informed Consent: Patient understands risks and agrees with Anesthesia plan.  Questions answered. Anesthesia consent signed with patient.  ASA Score: 2     Day of Surgery Review of History & Physical: I have interviewed and examined the patient. I have reviewed the patient's H&P dated:  There are no significant changes.  H&P update referred to the surgeon.         Ready For Surgery From Anesthesia Perspective.

## 2019-08-01 ENCOUNTER — DOCUMENTATION ONLY (OUTPATIENT)
Dept: ORTHOPEDICS | Facility: CLINIC | Age: 54
End: 2019-08-01

## 2019-08-01 ENCOUNTER — OFFICE VISIT (OUTPATIENT)
Dept: ORTHOPEDICS | Facility: CLINIC | Age: 54
End: 2019-08-01
Payer: COMMERCIAL

## 2019-08-01 VITALS — BODY MASS INDEX: 26.84 KG/M2 | HEIGHT: 66 IN | WEIGHT: 167 LBS

## 2019-08-01 DIAGNOSIS — Z98.890 S/P RIGHT KNEE ARTHROSCOPY: Primary | ICD-10-CM

## 2019-08-01 DIAGNOSIS — Z98.890 S/P ARTHROSCOPIC PARTIAL MEDIAL MENISCECTOMY: ICD-10-CM

## 2019-08-01 PROCEDURE — 99024 POSTOP FOLLOW-UP VISIT: CPT | Mod: S$GLB,,, | Performed by: ORTHOPAEDIC SURGERY

## 2019-08-01 PROCEDURE — 99999 PR PBB SHADOW E&M-EST. PATIENT-LVL III: ICD-10-PCS | Mod: PBBFAC,,, | Performed by: ORTHOPAEDIC SURGERY

## 2019-08-01 PROCEDURE — 99999 PR PBB SHADOW E&M-EST. PATIENT-LVL III: CPT | Mod: PBBFAC,,, | Performed by: ORTHOPAEDIC SURGERY

## 2019-08-01 PROCEDURE — 99024 SUTURE REMOVAL: ICD-10-PCS | Mod: S$GLB,,, | Performed by: ORTHOPAEDIC SURGERY

## 2019-08-01 NOTE — PROGRESS NOTES
HISTORY OF PRESENT ILLNESS:  A 54 years old, two weeks out from knee   arthroscopy, partial medial meniscectomy, doing well.  Sutures removed.  No   signs of infection.    PLAN:  We will set him up with therapy.  We will see him back as needed.      SOPHIAB/IN  dd: 08/01/2019 16:07:03 (CDT)  td: 08/02/2019 11:18:26 (CDT)  Doc ID   #[id number]  Job ID #[id number]    CC:

## 2019-08-01 NOTE — PROGRESS NOTES
Patient came in for post-op appointment today. Per Dr. Lakhani's order sutures were removed from right knee. Patient tolerated well. Incision site was clean and dry. No signs or symptoms of infection.

## 2019-08-06 ENCOUNTER — CLINICAL SUPPORT (OUTPATIENT)
Dept: REHABILITATION | Facility: HOSPITAL | Age: 54
End: 2019-08-06
Attending: ORTHOPAEDIC SURGERY
Payer: COMMERCIAL

## 2019-08-06 DIAGNOSIS — R53.1 WEAKNESS: ICD-10-CM

## 2019-08-06 DIAGNOSIS — M25.561 RIGHT KNEE PAIN, UNSPECIFIED CHRONICITY: ICD-10-CM

## 2019-08-06 PROCEDURE — 97110 THERAPEUTIC EXERCISES: CPT | Mod: PO | Performed by: PHYSICAL THERAPIST

## 2019-08-06 PROCEDURE — 97161 PT EVAL LOW COMPLEX 20 MIN: CPT | Mod: PO | Performed by: PHYSICAL THERAPIST
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
## 2019-08-06 NOTE — PATIENT INSTRUCTIONS
Step-Down / Step-Up        Stand on stair step or ____ inch stool. Slowly bend left leg, lowering other foot to floor. Return by straightening front leg.  Repeat __8-10__ times per set. Do _2-3___ sets per session. Do __2-3__ sessions per week.     [email or web address]://[email or web address]/684     Copyright © [employer]. All rights reserved.   Functional Quadriceps: Chair Squat        Keeping feet flat on floor, shoulder width apart, squat as low as is comfortable. Use support as necessary.  Repeat _8-10___ times per set. Do __2-3__ sets per session. Do _2-3___ sessions per week.     [email or web address]://[email or web address]/736     Copyright © [employer]. All rights reserved.   Knee Extension (Sitting)        Place ____ pound weight on left ankle and straighten knee fully, lower slowly.  Repeat _8-10___ times per set. Do __2-3__ sets per session. Do __2-3__ sessions per week.     [email or web address]/732     Copyright © [employer]. All rights reserved.

## 2019-08-13 ENCOUNTER — CLINICAL SUPPORT (OUTPATIENT)
Dept: REHABILITATION | Facility: HOSPITAL | Age: 54
End: 2019-08-13
Attending: ORTHOPAEDIC SURGERY
Payer: COMMERCIAL

## 2019-08-13 DIAGNOSIS — M25.561 RIGHT KNEE PAIN, UNSPECIFIED CHRONICITY: ICD-10-CM

## 2019-08-13 DIAGNOSIS — R53.1 WEAKNESS: ICD-10-CM

## 2019-08-13 PROCEDURE — 97110 THERAPEUTIC EXERCISES: CPT | Mod: PO | Performed by: PHYSICAL THERAPIST

## 2019-08-13 NOTE — PROGRESS NOTES
"  Physical Therapy Daily Treatment Note     Name: Chino Ramirez  Clinic Number: [id number]    Therapy Diagnosis:   Encounter Diagnoses   Name Primary?    Right knee pain, unspecified chronicity     Weakness      Physician: Esa Lakhani MD    Visit Date: 8/13/2019  Physician Orders: PT Eval and Treat   Medical Diagnosis from Referral: S/P right knee arthroscopy  Evaluation Date: 8/6/2019  Authorization Period Expiration: 12/31/2019  Plan of Care Expiration: 09/17/2019  Visit # / Visits authorized: 2/ 20     Time In: 4:00 PM   Time Out: 4:42 PM   Total Billable Time: 4    Precautions: Standard    Subjective     Pt reports: That his knee is doing well. It hurts me some when walking on uneven terrain, but overall it is doing well.   He was compliant with home exercise program.  Response to previous treatment: Initial eval   Functional change: Too soon to tell     Pain: 0/10  Location: right knee      Objective     Damir received therapeutic exercises to develop strength and ROM for 42 minutes including:  Recumbent bike 8 min to assist in improving muscle endurance  Standing gastroc stretch 4x30"  Precor leg press 3 x 10 80#  Step ups 3x10   Ball squats 3x10   Leg extensions with eccentric lowering 3x10 20#   Shuttle leg press U 3x10 3 bands  Lateral band walk 25 ft. X 4  Lateral lunges 3x10     Home Exercises Provided and Patient Education Provided     Education provided:   - On possibility of post exercise soreness     Written Home Exercises Provided: Patient instructed to cont prior HEP.  Exercises were reviewed and Damir was able to demonstrate them prior to the end of the session.  Damir demonstrated good  understanding of the education provided.     See EMR under Patient Instructions for exercises provided prior visit.    Assessment     Able to perform all exercises without an increase in knee pain He will benefit from progression of strengthening if no persistent soreness next visit.   Damir is " progressing well towards his goals.   Pt prognosis is Excellent.     Pt will continue to benefit from skilled outpatient physical therapy to address the deficits listed in the problem list box on initial evaluation, provide pt/family education and to maximize pt's level of independence in the home and community environment.     Pt's spiritual, cultural and educational needs considered and pt agreeable to plan of care and goals.    Anticipated barriers to physical therapy: None at this time    Goals:     Short Term Goals: 3 weeks   1) Pt will be I with established HEP   2) Right knee girth will be equal to left   3) Pt. Will ascend/descend stairs in a reciprocal manner with right knee pain no worse then 2/10  4) Pt. Will return to exercise activities with with right knee pain no worse then 2/10     Long Term Goals: 6 weeks   1) Pt will participate in exercise activities without limitations related to right knee pain   2) Strength will be 5/5 in all muscle groups   3) Pt. Will walk community distances with normal gait speed and mechanics     Plan     Progress strengthening as tolerated     iRgo Abad, PT

## 2019-08-15 ENCOUNTER — CLINICAL SUPPORT (OUTPATIENT)
Dept: REHABILITATION | Facility: HOSPITAL | Age: 54
End: 2019-08-15
Attending: ORTHOPAEDIC SURGERY
Payer: COMMERCIAL

## 2019-08-15 DIAGNOSIS — R53.1 WEAKNESS: ICD-10-CM

## 2019-08-15 DIAGNOSIS — M25.561 RIGHT KNEE PAIN, UNSPECIFIED CHRONICITY: ICD-10-CM

## 2019-08-15 PROCEDURE — 97110 THERAPEUTIC EXERCISES: CPT | Mod: PO | Performed by: PHYSICAL THERAPIST

## 2019-08-15 NOTE — PROGRESS NOTES
"  Physical Therapy Daily Treatment Note     Name: Chino Ramirez  Clinic Number: [id number]    Therapy Diagnosis:   Encounter Diagnoses   Name Primary?    Right knee pain, unspecified chronicity     Weakness      Physician: Esa Lakhani MD    Visit Date: 8/15/2019  Physician Orders: PT Eval and Treat   Medical Diagnosis from Referral: S/P right knee arthroscopy  Evaluation Date: 8/6/2019  Authorization Period Expiration: 12/31/2019  Plan of Care Expiration: 09/17/2019  Visit # / Visits authorized: / 20     Time In: 4:00 PM   Time Out: 4:53 PM   Total Billable Time: 53 minutes    Precautions: Standard    Subjective     Pt reports: That he had some soreness following his last session, but feels that its a good soreness   He was compliant with home exercise program.  Response to previous treatment: Muscle soreness   Functional change: Too soon to tell     Pain: 0/10  Location: right knee      Objective     Damir received therapeutic exercises to develop strength and ROM for 53 minutes including:  Recumbent bike 8 min to assist in improving muscle endurance  Standing gastroc stretch 4x30"  Precor leg press 3 x 10 80#  Step ups 3x10 12" box  Goblet squats 3x10 25#  Leg extensions with eccentric lowering 3x10 20#   Shuttle leg press U 3x10 3 bands  Lateral band walk 25 ft. X 4  Lateral lunges 3x10   Split squats 3x10   Single leg bridges 3x10   SB hamstring curls     Home Exercises Provided and Patient Education Provided     Education provided:   - On possibility of post exercise soreness     Written Home Exercises Provided: Patient instructed to cont prior HEP.  Exercises were reviewed and Damir was able to demonstrate them prior to the end of the session.  Damir demonstrated good  understanding of the education provided.     See EMR under Patient Instructions for exercises provided prior visit.    Assessment     Soreness not unexpected after initiation of exercise activities. He was able to progress exercise " activities without an increase in pain. He will benefit from continued progression of strengthening   Damir is progressing well towards his goals.   Pt prognosis is Excellent.     Pt will continue to benefit from skilled outpatient physical therapy to address the deficits listed in the problem list box on initial evaluation, provide pt/family education and to maximize pt's level of independence in the home and community environment.     Pt's spiritual, cultural and educational needs considered and pt agreeable to plan of care and goals.    Anticipated barriers to physical therapy: None at this time    Goals:     Short Term Goals: 3 weeks   1) Pt will be I with established HEP   2) Right knee girth will be equal to left   3) Pt. Will ascend/descend stairs in a reciprocal manner with right knee pain no worse then 2/10  4) Pt. Will return to exercise activities with with right knee pain no worse then 2/10     Long Term Goals: 6 weeks   1) Pt will participate in exercise activities without limitations related to right knee pain   2) Strength will be 5/5 in all muscle groups   3) Pt. Will walk community distances with normal gait speed and mechanics     Plan     Progress strengthening as tolerated     Rigo Abad, PT

## 2019-10-09 ENCOUNTER — TELEPHONE (OUTPATIENT)
Dept: OPTOMETRY | Facility: CLINIC | Age: 54
End: 2019-10-09

## 2019-10-09 ENCOUNTER — OFFICE VISIT (OUTPATIENT)
Dept: OPHTHALMOLOGY | Facility: CLINIC | Age: 54
End: 2019-10-09
Payer: COMMERCIAL

## 2019-10-09 DIAGNOSIS — H02.055 TRICHIASIS OF LEFT LOWER EYELID WITHOUT ENTROPION: Primary | ICD-10-CM

## 2019-10-09 PROCEDURE — 67820 PR REVISE EYELASHES,FORCEPS: ICD-10-PCS | Mod: E2,S$GLB,, | Performed by: OPHTHALMOLOGY

## 2019-10-09 PROCEDURE — 99999 PR PBB SHADOW E&M-EST. PATIENT-LVL II: CPT | Mod: PBBFAC,,, | Performed by: OPHTHALMOLOGY

## 2019-10-09 PROCEDURE — 99999 PR PBB SHADOW E&M-EST. PATIENT-LVL II: ICD-10-PCS | Mod: PBBFAC,,, | Performed by: OPHTHALMOLOGY

## 2019-10-09 PROCEDURE — 67820 REVISE EYELASHES: CPT | Mod: E2,S$GLB,, | Performed by: OPHTHALMOLOGY

## 2019-10-09 NOTE — TELEPHONE ENCOUNTER
----- Message from Clover Laith sent at 10/9/2019 10:35 AM CDT -----  Contact: pt  Type: Needs Medical Advice    Who Called:  pt  Best Call Back Number:   Additional Information: Requesting a call back from Nurse regarding access to appt today pt has eye lashes in eye that is very irritating

## 2019-10-10 NOTE — PROGRESS NOTES
HPI     Dr Virgen pt    Recurrent trichiasis LLL    AT's PRN OU     Pt states he has eyelashes that are bothering him OS.  Pt states   irritation OS. Pt states he tried to remove the eyelash OS but wasn't able   to get it.     Last edited by Amelia Chapman MA on 10/9/2019  2:19 PM.   (History)            Assessment /Plan     For exam results, see Encounter Report.    Trichiasis of left lower eyelid without entropion      LLL eyelash epilated without complication    F/up dr. virgen as scheduled.

## 2019-10-22 ENCOUNTER — TELEPHONE (OUTPATIENT)
Dept: FAMILY MEDICINE | Facility: CLINIC | Age: 54
End: 2019-10-22

## 2019-10-22 ENCOUNTER — OFFICE VISIT (OUTPATIENT)
Dept: FAMILY MEDICINE | Facility: CLINIC | Age: 54
End: 2019-10-22
Payer: COMMERCIAL

## 2019-10-22 VITALS
BODY MASS INDEX: 27.36 KG/M2 | DIASTOLIC BLOOD PRESSURE: 82 MMHG | HEART RATE: 65 BPM | OXYGEN SATURATION: 96 % | WEIGHT: 169.56 LBS | SYSTOLIC BLOOD PRESSURE: 136 MMHG

## 2019-10-22 DIAGNOSIS — R00.2 PALPITATIONS: Primary | ICD-10-CM

## 2019-10-22 DIAGNOSIS — G47.00 INSOMNIA, UNSPECIFIED TYPE: ICD-10-CM

## 2019-10-22 DIAGNOSIS — F43.23 SITUATIONAL MIXED ANXIETY AND DEPRESSIVE DISORDER: ICD-10-CM

## 2019-10-22 PROCEDURE — 99999 PR PBB SHADOW E&M-EST. PATIENT-LVL III: CPT | Mod: PBBFAC,,, | Performed by: INTERNAL MEDICINE

## 2019-10-22 PROCEDURE — 3008F PR BODY MASS INDEX (BMI) DOCUMENTED: ICD-10-PCS | Mod: CPTII,S$GLB,, | Performed by: INTERNAL MEDICINE

## 2019-10-22 PROCEDURE — 99214 PR OFFICE/OUTPT VISIT, EST, LEVL IV, 30-39 MIN: ICD-10-PCS | Mod: S$GLB,,, | Performed by: INTERNAL MEDICINE

## 2019-10-22 PROCEDURE — 99999 PR PBB SHADOW E&M-EST. PATIENT-LVL III: ICD-10-PCS | Mod: PBBFAC,,, | Performed by: INTERNAL MEDICINE

## 2019-10-22 PROCEDURE — 3008F BODY MASS INDEX DOCD: CPT | Mod: CPTII,S$GLB,, | Performed by: INTERNAL MEDICINE

## 2019-10-22 PROCEDURE — 99214 OFFICE O/P EST MOD 30 MIN: CPT | Mod: S$GLB,,, | Performed by: INTERNAL MEDICINE

## 2019-10-22 RX ORDER — TRAZODONE HYDROCHLORIDE 50 MG/1
50 TABLET ORAL NIGHTLY
Qty: 30 TABLET | Refills: 2 | Status: SHIPPED | OUTPATIENT
Start: 2019-10-22 | End: 2020-08-27
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
## 2019-10-22 NOTE — TELEPHONE ENCOUNTER
----- Message from Clovreanastasiia Ozuna sent at 10/22/2019 12:35 PM CDT -----  Contact: pt  Type: Needs Medical Advice    Who Called:  pt  Best Call Back Number:   Additional Information: Requesting a call back from Nurse regarding access to an appt for TODAY pt is having bad headache and feeling very nauseated for a couple of weeks,please call with advice

## 2020-04-07 ENCOUNTER — DOCUMENTATION ONLY (OUTPATIENT)
Dept: REHABILITATION | Facility: HOSPITAL | Age: 55
End: 2020-04-07

## 2020-04-07 PROBLEM — M25.561 RIGHT KNEE PAIN: Status: RESOLVED | Noted: 2019-08-06 | Resolved: 2020-04-07

## 2020-04-07 PROBLEM — R53.1 WEAKNESS: Status: RESOLVED | Noted: 2019-08-06 | Resolved: 2020-04-07

## 2020-04-07 NOTE — PROGRESS NOTES
Patient was seen for 3  outpatient PT visits from 8/6/19 to 8/15/19.. Treatment included: evaluation, HEP, pt education, manual therapy, and ther ex. Pt has met some goals. He did not return for any follow up visits. This patient is discharged from outpatient PT Services.    Rigo Abad, PT

## 2020-05-26 ENCOUNTER — OFFICE VISIT (OUTPATIENT)
Dept: FAMILY MEDICINE | Facility: CLINIC | Age: 55
End: 2020-05-26
Payer: COMMERCIAL

## 2020-05-26 VITALS
HEART RATE: 65 BPM | DIASTOLIC BLOOD PRESSURE: 66 MMHG | BODY MASS INDEX: 27.46 KG/M2 | TEMPERATURE: 99 F | OXYGEN SATURATION: 97 % | SYSTOLIC BLOOD PRESSURE: 118 MMHG | WEIGHT: 170.88 LBS | HEIGHT: 66 IN

## 2020-05-26 DIAGNOSIS — Z00.00 HEALTHCARE MAINTENANCE: Primary | ICD-10-CM

## 2020-05-26 DIAGNOSIS — F41.9 ANXIETY: ICD-10-CM

## 2020-05-26 DIAGNOSIS — G47.00 INSOMNIA, UNSPECIFIED TYPE: ICD-10-CM

## 2020-05-26 DIAGNOSIS — R07.9 CHEST PAIN, UNSPECIFIED TYPE: ICD-10-CM

## 2020-05-26 DIAGNOSIS — R00.2 HEART PALPITATIONS: ICD-10-CM

## 2020-05-26 DIAGNOSIS — Z00.00 ENCOUNTER FOR WELLNESS EXAMINATION IN ADULT: ICD-10-CM

## 2020-05-26 PROCEDURE — 3008F PR BODY MASS INDEX (BMI) DOCUMENTED: ICD-10-PCS | Mod: CPTII,S$GLB,, | Performed by: NURSE PRACTITIONER

## 2020-05-26 PROCEDURE — 99999 PR PBB SHADOW E&M-EST. PATIENT-LVL IV: ICD-10-PCS | Mod: PBBFAC,,, | Performed by: NURSE PRACTITIONER

## 2020-05-26 PROCEDURE — 93010 EKG 12-LEAD: ICD-10-PCS | Mod: S$GLB,,, | Performed by: INTERNAL MEDICINE

## 2020-05-26 PROCEDURE — 99999 PR PBB SHADOW E&M-EST. PATIENT-LVL IV: CPT | Mod: PBBFAC,,, | Performed by: NURSE PRACTITIONER

## 2020-05-26 PROCEDURE — 99214 OFFICE O/P EST MOD 30 MIN: CPT | Mod: S$GLB,,, | Performed by: NURSE PRACTITIONER

## 2020-05-26 PROCEDURE — 93010 ELECTROCARDIOGRAM REPORT: CPT | Mod: S$GLB,,, | Performed by: INTERNAL MEDICINE

## 2020-05-26 PROCEDURE — 99214 PR OFFICE/OUTPT VISIT, EST, LEVL IV, 30-39 MIN: ICD-10-PCS | Mod: S$GLB,,, | Performed by: NURSE PRACTITIONER

## 2020-05-26 PROCEDURE — 3008F BODY MASS INDEX DOCD: CPT | Mod: CPTII,S$GLB,, | Performed by: NURSE PRACTITIONER

## 2020-05-26 PROCEDURE — 93005 EKG 12-LEAD: ICD-10-PCS | Mod: S$GLB,,, | Performed by: NURSE PRACTITIONER

## 2020-05-26 PROCEDURE — 93005 ELECTROCARDIOGRAM TRACING: CPT | Mod: S$GLB,,, | Performed by: NURSE PRACTITIONER

## 2020-05-26 NOTE — PATIENT INSTRUCTIONS
Make appt with cardiology for evaluation of chest pain or jolt felt.  Schedule lab work at your nearest convenience.  Educated on supportive care and return precautions to the clinic.    Follow up for further evaluation if S/S worsen or fail to improve.  Call for temperature greater than 101, pain uncontrolled by oral medications, Nausea/vomiting, inability to tolerate oral medications, chest pain, shortness of breath, altered mental status, or any acute events

## 2020-05-26 NOTE — PROGRESS NOTES
"Subjective:       Patient ID: Chino Ramirez is a 54 y.o. male.    Chief Complaint: Annual Exam    Mr. Ramirez is a new patient to me. He is here today for a wellness exam.  During the exam he stated he had some chest pain at work.  He described it as a "jolt" in the center of his chest with multiple episodes, during the last few weeks, on random days.  The jolt lasts a few seconds without any other symptoms contributing. He says the discomfort is rated 8/10.  He would rest and slow deep breath and and it would go away.  He did state "he has increased his caffeine consumption a lot during the last few weeks due to extra work hours and stress."  He denies GI symptoms, or urinary problems.  He does complain of insomnia on most nights due to stress from work. He has a bowel movement daily without cause for concern.  He denies fever, cough or SOB or pain.    Vitals:    05/26/20 1431   BP: 118/66   Pulse: 65   Temp: 99.1 °F (37.3 °C)     Review of Systems   Constitutional: Positive for activity change and appetite change. Negative for chills, fatigue and fever.   HENT: Positive for nosebleeds. Negative for congestion, ear pain, postnasal drip, rhinorrhea, sinus pressure, sinus pain, sneezing, sore throat and trouble swallowing.    Eyes: Negative for pain, redness and visual disturbance.   Respiratory: Positive for chest tightness. Negative for cough, choking, shortness of breath and wheezing.    Cardiovascular: Positive for chest pain and palpitations. Negative for leg swelling.   Gastrointestinal: Negative for abdominal pain, blood in stool, constipation, diarrhea, nausea and vomiting.   Endocrine: Negative.    Genitourinary: Negative for decreased urine volume, difficulty urinating, dysuria, flank pain, hematuria and urgency.   Musculoskeletal: Negative for back pain, gait problem, myalgias and neck pain.   Skin: Negative for color change, pallor and rash.   Allergic/Immunologic: Negative.    Neurological: Positive " for headaches. Negative for dizziness, speech difficulty, weakness, light-headedness and numbness.   Hematological: Does not bruise/bleed easily.   Psychiatric/Behavioral: Positive for sleep disturbance. Negative for self-injury and suicidal ideas. The patient is nervous/anxious.        Past Medical History:   Diagnosis Date    Allergic rhinitis due to other allergen 8/11/2010    ED (erectile dysfunction)     HLD (hyperlipidemia) 1/2/2012    Hyperlipidemia     Hypogonadism male 1/2/2012    Osteoarthrosis, unspecified whether generalized or localized, lower leg 9/18/2014    PONV (postoperative nausea and vomiting)     Tendonitis 2014    right elbow       Objective:      Physical Exam   Constitutional: He is oriented to person, place, and time. Vital signs are normal. He appears well-developed and well-nourished. He is active and cooperative. No distress.   HENT:   Head: Normocephalic and atraumatic. Head is without right periorbital erythema and without left periorbital erythema.   Right Ear: Hearing and external ear normal.   Left Ear: Hearing and external ear normal.   Nose: Nose normal. No mucosal edema or rhinorrhea. Right sinus exhibits no maxillary sinus tenderness and no frontal sinus tenderness. Left sinus exhibits no maxillary sinus tenderness and no frontal sinus tenderness.   Mouth/Throat: Uvula is midline, oropharynx is clear and moist and mucous membranes are normal.   Eyes: Pupils are equal, round, and reactive to light. Conjunctivae and EOM are normal.   Neck: Trachea normal, normal range of motion and full passive range of motion without pain. Neck supple.   Cardiovascular: Normal rate, normal heart sounds, intact distal pulses and normal pulses. An irregular rhythm present.   Pulmonary/Chest: Effort normal and breath sounds normal. No accessory muscle usage or stridor. No apnea, no tachypnea and no bradypnea. No respiratory distress. He has no decreased breath sounds. He has no wheezes. He  has no rhonchi. He has no rales.   Clear to all lung fields   Abdominal: Soft. Normal appearance and bowel sounds are normal. There is no tenderness.   Musculoskeletal: Normal range of motion.   Neurological: He is alert and oriented to person, place, and time. He has normal strength. No cranial nerve deficit or sensory deficit.   Skin: Skin is warm and intact. Capillary refill takes less than 2 seconds. No lesion, no petechiae and no rash noted. He is not diaphoretic. No erythema.   Psychiatric: His speech is normal and behavior is normal. Judgment and thought content normal. His mood appears anxious. Cognition and memory are normal.   Nursing note and vitals reviewed.      Assessment:       1. Healthcare maintenance    2. Chest pain, unspecified type    3. Encounter for wellness examination in adult    4. Heart palpitations    5. Anxiety    6. Insomnia, unspecified type        Plan:       Healthcare maintenance  -     CBC auto differential; Future; Expected date: 05/26/2020  -     Comprehensive metabolic panel; Future; Expected date: 05/26/2020  -     Hemoglobin A1C; Future; Expected date: 05/26/2020  -     Lipid Panel; Future; Expected date: 05/26/2020  -     Vitamin B12; Future; Expected date: 05/26/2020  -     Vitamin D; Future; Expected date: 05/26/2020  -     TSH; Future; Expected date: 05/26/2020  -     PSA, Screening; Future; Expected date: 05/26/2020  -     Magnesium; Future; Expected date: 05/26/2020  -     Testosterone, free; Future; Expected date: 05/26/2020  -     HIV 1/2 Ag/Ab (4th Gen); Future; Expected date: 05/26/2020    Chest pain, unspecified type  -     IN OFFICE EKG 12-LEAD (to [place])  -     Ambulatory referral/consult to Cardiology; Future; Expected date: 05/27/2020    Encounter for wellness examination in adult    Heart palpitations  Decrease caffeine consumption to 1-2 cups of coffee a day.  Keep a daily journal to list caffeine consumption with heart palpitations and any chest pain or jolt  type symptoms.  Schedule appt with cardiology for evaluation.     Make appt with cardiology for evaluation of chest pain or jolt felt.  Schedule lab work at your nearest convenience.  Educated on supportive care and return precautions to the clinic.    Follow up for further evaluation if S/S worsen or fail to improve.  Call for temperature greater than 101, pain uncontrolled by oral medications, Nausea/vomiting, inability to tolerate oral medications, chest pain, shortness of breath, altered mental status, or any acute events      Follow up in about 1 month (around 6/26/2020) for chest pain that persists.

## 2020-05-27 ENCOUNTER — LAB VISIT (OUTPATIENT)
Dept: LAB | Facility: HOSPITAL | Age: 55
End: 2020-05-27
Attending: FAMILY MEDICINE
Payer: COMMERCIAL

## 2020-05-27 DIAGNOSIS — Z00.00 HEALTHCARE MAINTENANCE: ICD-10-CM

## 2020-05-27 LAB
25(OH)D3+25(OH)D2 SERPL-MCNC: 54 NG/ML (ref 30–96)
ALBUMIN SERPL BCP-MCNC: 3.9 G/DL (ref 3.5–5.2)
ALP SERPL-CCNC: 57 U/L (ref 55–135)
ALT SERPL W/O P-5'-P-CCNC: 25 U/L (ref 10–44)
ANION GAP SERPL CALC-SCNC: 7 MMOL/L (ref 8–16)
AST SERPL-CCNC: 26 U/L (ref 10–40)
BASOPHILS # BLD AUTO: 0.1 K/UL (ref 0–0.2)
BASOPHILS NFR BLD: 1.4 % (ref 0–1.9)
BILIRUB SERPL-MCNC: 0.2 MG/DL (ref 0.1–1)
BUN SERPL-MCNC: 19 MG/DL (ref 6–20)
CALCIUM SERPL-MCNC: 9 MG/DL (ref 8.7–10.5)
CHLORIDE SERPL-SCNC: 107 MMOL/L (ref 95–110)
CHOLEST SERPL-MCNC: 173 MG/DL (ref 120–199)
CHOLEST/HDLC SERPL: 6.2 {RATIO} (ref 2–5)
CO2 SERPL-SCNC: 27 MMOL/L (ref 23–29)
COMPLEXED PSA SERPL-MCNC: 0.53 NG/ML (ref 0–4)
CREAT SERPL-MCNC: 1.3 MG/DL (ref 0.5–1.4)
DIFFERENTIAL METHOD: ABNORMAL
EOSINOPHIL # BLD AUTO: 0.2 K/UL (ref 0–0.5)
EOSINOPHIL NFR BLD: 2.5 % (ref 0–8)
ERYTHROCYTE [DISTWIDTH] IN BLOOD BY AUTOMATED COUNT: 12.1 % (ref 11.5–14.5)
EST. GFR  (AFRICAN AMERICAN): >60 ML/MIN/1.73 M^2
EST. GFR  (NON AFRICAN AMERICAN): >60 ML/MIN/1.73 M^2
ESTIMATED AVG GLUCOSE: 117 MG/DL (ref 68–131)
GLUCOSE SERPL-MCNC: 74 MG/DL (ref 70–110)
HBA1C MFR BLD HPLC: 5.7 % (ref 4–5.6)
HCT VFR BLD AUTO: 47.2 % (ref 40–54)
HDLC SERPL-MCNC: 28 MG/DL (ref 40–75)
HDLC SERPL: 16.2 % (ref 20–50)
HGB BLD-MCNC: 14.8 G/DL (ref 14–18)
IMM GRANULOCYTES # BLD AUTO: 0.02 K/UL (ref 0–0.04)
IMM GRANULOCYTES NFR BLD AUTO: 0.3 % (ref 0–0.5)
LDLC SERPL CALC-MCNC: 113 MG/DL (ref 63–159)
LYMPHOCYTES # BLD AUTO: 2.5 K/UL (ref 1–4.8)
LYMPHOCYTES NFR BLD: 34.2 % (ref 18–48)
MAGNESIUM SERPL-MCNC: 2.3 MG/DL (ref 1.6–2.6)
MCH RBC QN AUTO: 30.1 PG (ref 27–31)
MCHC RBC AUTO-ENTMCNC: 31.4 G/DL (ref 32–36)
MCV RBC AUTO: 96 FL (ref 82–98)
MONOCYTES # BLD AUTO: 0.8 K/UL (ref 0.3–1)
MONOCYTES NFR BLD: 10.7 % (ref 4–15)
NEUTROPHILS # BLD AUTO: 3.7 K/UL (ref 1.8–7.7)
NEUTROPHILS NFR BLD: 50.9 % (ref 38–73)
NONHDLC SERPL-MCNC: 145 MG/DL
NRBC BLD-RTO: 0 /100 WBC
PLATELET # BLD AUTO: 211 K/UL (ref 150–350)
PMV BLD AUTO: 10.8 FL (ref 9.2–12.9)
POTASSIUM SERPL-SCNC: 4 MMOL/L (ref 3.5–5.1)
PROT SERPL-MCNC: 7.2 G/DL (ref 6–8.4)
RBC # BLD AUTO: 4.92 M/UL (ref 4.6–6.2)
SODIUM SERPL-SCNC: 141 MMOL/L (ref 136–145)
TRIGL SERPL-MCNC: 160 MG/DL (ref 30–150)
TSH SERPL DL<=0.005 MIU/L-ACNC: 2.36 UIU/ML (ref 0.4–4)
VIT B12 SERPL-MCNC: >2000 PG/ML (ref 210–950)
WBC # BLD AUTO: 7.3 K/UL (ref 3.9–12.7)

## 2020-05-27 PROCEDURE — 85025 COMPLETE CBC W/AUTO DIFF WBC: CPT

## 2020-05-27 PROCEDURE — 82306 VITAMIN D 25 HYDROXY: CPT

## 2020-05-27 PROCEDURE — 86703 HIV-1/HIV-2 1 RESULT ANTBDY: CPT

## 2020-05-27 PROCEDURE — 84443 ASSAY THYROID STIM HORMONE: CPT

## 2020-05-27 PROCEDURE — 83735 ASSAY OF MAGNESIUM: CPT

## 2020-05-27 PROCEDURE — 84402 ASSAY OF FREE TESTOSTERONE: CPT

## 2020-05-27 PROCEDURE — 83036 HEMOGLOBIN GLYCOSYLATED A1C: CPT

## 2020-05-27 PROCEDURE — 80053 COMPREHEN METABOLIC PANEL: CPT

## 2020-05-27 PROCEDURE — 36415 COLL VENOUS BLD VENIPUNCTURE: CPT | Mod: PO

## 2020-05-27 PROCEDURE — 80061 LIPID PANEL: CPT

## 2020-05-27 PROCEDURE — 82607 VITAMIN B-12: CPT

## 2020-05-27 PROCEDURE — 84153 ASSAY OF PSA TOTAL: CPT

## 2020-05-28 LAB — HIV 1+2 AB+HIV1 P24 AG SERPL QL IA: NEGATIVE

## 2020-06-01 LAB — TESTOST FREE SERPL-MCNC: 14.6 PG/ML

## 2020-06-02 DIAGNOSIS — E78.6 HDL DEFICIENCY: Primary | ICD-10-CM

## 2020-06-02 RX ORDER — FENOFIBRATE 43 MG/1
43 CAPSULE ORAL
Qty: 90 CAPSULE | Refills: 3 | Status: SHIPPED | OUTPATIENT
Start: 2020-06-02 | End: 2020-08-27

## 2020-06-14 ENCOUNTER — PATIENT OUTREACH (OUTPATIENT)
Dept: ADMINISTRATIVE | Facility: OTHER | Age: 55
End: 2020-06-14

## 2020-06-15 ENCOUNTER — OFFICE VISIT (OUTPATIENT)
Dept: CARDIOLOGY | Facility: CLINIC | Age: 55
End: 2020-06-15
Payer: COMMERCIAL

## 2020-06-15 ENCOUNTER — TELEPHONE (OUTPATIENT)
Dept: FAMILY MEDICINE | Facility: CLINIC | Age: 55
End: 2020-06-15

## 2020-06-15 VITALS
SYSTOLIC BLOOD PRESSURE: 123 MMHG | HEIGHT: 66 IN | WEIGHT: 167.56 LBS | DIASTOLIC BLOOD PRESSURE: 72 MMHG | BODY MASS INDEX: 26.93 KG/M2 | HEART RATE: 67 BPM

## 2020-06-15 DIAGNOSIS — R00.2 PALPITATIONS: ICD-10-CM

## 2020-06-15 DIAGNOSIS — Z20.822 CLOSE EXPOSURE TO COVID-19 VIRUS: Primary | ICD-10-CM

## 2020-06-15 DIAGNOSIS — R07.9 CHEST PAIN, UNSPECIFIED TYPE: Primary | ICD-10-CM

## 2020-06-15 DIAGNOSIS — E78.5 HYPERLIPIDEMIA, UNSPECIFIED HYPERLIPIDEMIA TYPE: ICD-10-CM

## 2020-06-15 PROCEDURE — 99204 OFFICE O/P NEW MOD 45 MIN: CPT | Mod: S$GLB,,, | Performed by: INTERNAL MEDICINE

## 2020-06-15 PROCEDURE — 3008F BODY MASS INDEX DOCD: CPT | Mod: CPTII,S$GLB,, | Performed by: INTERNAL MEDICINE

## 2020-06-15 PROCEDURE — 99999 PR PBB SHADOW E&M-EST. PATIENT-LVL III: ICD-10-PCS | Mod: PBBFAC,,, | Performed by: INTERNAL MEDICINE

## 2020-06-15 PROCEDURE — 99204 PR OFFICE/OUTPT VISIT, NEW, LEVL IV, 45-59 MIN: ICD-10-PCS | Mod: S$GLB,,, | Performed by: INTERNAL MEDICINE

## 2020-06-15 PROCEDURE — 99999 PR PBB SHADOW E&M-EST. PATIENT-LVL III: CPT | Mod: PBBFAC,,, | Performed by: INTERNAL MEDICINE

## 2020-06-15 PROCEDURE — 3008F PR BODY MASS INDEX (BMI) DOCUMENTED: ICD-10-PCS | Mod: CPTII,S$GLB,, | Performed by: INTERNAL MEDICINE

## 2020-06-15 NOTE — TELEPHONE ENCOUNTER
----- Message from Yuli Bass sent at 6/15/2020 10:58 AM CDT -----  Regarding: covid testing  Hi. Mr Ramirez is interested in getting tested for corona virus. Please call him to give details about this. Thank you.

## 2020-06-15 NOTE — PROGRESS NOTES
Subjective:    Patient ID:  Chino Ramirez is a 54 y.o. male who presents for evaluation of palpitations    HPI  He comes with atypical chest pain and palpitations for the last 1-2 weeks    Review of Systems   Constitution: Negative for decreased appetite, malaise/fatigue, weight gain and weight loss.   Cardiovascular: Negative for chest pain, dyspnea on exertion, leg swelling, palpitations and syncope.   Respiratory: Negative for cough and shortness of breath.    Gastrointestinal: Negative.    Neurological: Negative for weakness.   All other systems reviewed and are negative.       Objective:      Physical Exam   Constitutional: He is oriented to person, place, and time. He appears well-developed and well-nourished.   HENT:   Head: Normocephalic.   Eyes: Pupils are equal, round, and reactive to light.   Neck: Normal range of motion. Neck supple. No JVD present. Carotid bruit is not present. No thyromegaly present.   Cardiovascular: Normal rate, regular rhythm, normal heart sounds, intact distal pulses and normal pulses. PMI is not displaced. Exam reveals no gallop.   No murmur heard.  Pulmonary/Chest: Effort normal and breath sounds normal.   Abdominal: Soft. Normal appearance. He exhibits no mass. There is no hepatosplenomegaly. There is no abdominal tenderness.   Musculoskeletal: Normal range of motion.         General: No edema.   Neurological: He is alert and oriented to person, place, and time. He has normal strength and normal reflexes. No sensory deficit.   Skin: Skin is warm and intact.   Psychiatric: He has a normal mood and affect.   Nursing note and vitals reviewed.        Assessment:       1. Chest pain, unspecified type    2. Palpitations    3. Hyperlipidemia, unspecified hyperlipidemia type         Plan:     Stress echo   Call with results

## 2020-06-15 NOTE — LETTER
Louise 15, 2020      Madelin Escobar NP  [address] [place] [address] [address] [id number]           [facility]  [address] [place] [address]  Phone: [phone]          Patient: Chino Ramirez   MR Number: [id number]   YOB: 1965   Date of Visit: 6/15/2020       Dear Madelin Escobar:    Thank you for referring Chino Ramirez to me for evaluation. Attached you will find relevant portions of my assessment and plan of care.    If you have questions, please do not hesitate to call me. I look forward to following Chino Ramirez along with you.    Sincerely,    Lino Dietz MD    Enclosure  CC:  No Recipients    If you would like to receive this communication electronically, please contact [email or web address] or [phone] to request more information on [employer] access.    For providers and/or their staff who would like to refer a patient to [facility], please contact us through our one-stop-shop provider referral line, Frank Adkins, at [phone].    If you feel you have received this communication in error or would no longer like to receive these types of communications, please e-mail [email or web address]

## 2020-06-16 ENCOUNTER — LAB VISIT (OUTPATIENT)
Dept: PRIMARY CARE CLINIC | Facility: OTHER | Age: 55
End: 2020-06-16
Payer: COMMERCIAL

## 2020-06-16 DIAGNOSIS — Z11.59 SPECIAL SCREENING EXAMINATION FOR UNSPECIFIED VIRAL DISEASE: ICD-10-CM

## 2020-06-16 PROCEDURE — U0003 INFECTIOUS AGENT DETECTION BY NUCLEIC ACID (DNA OR RNA); SEVERE ACUTE RESPIRATORY SYNDROME CORONAVIRUS 2 (SARS-COV-2) (CORONAVIRUS DISEASE [COVID-19]), AMPLIFIED PROBE TECHNIQUE, MAKING USE OF HIGH THROUGHPUT TECHNOLOGIES AS DESCRIBED BY CMS-2020-01-R: HCPCS

## 2020-06-18 LAB — SARS-COV-2 RNA RESP QL NAA+PROBE: DETECTED

## 2020-06-18 NOTE — TELEPHONE ENCOUNTER
Patient is positive for covid. When should he have the antibody test done? Please advise. Patient was not notified yet of his covid status.

## 2020-06-23 ENCOUNTER — TELEPHONE (OUTPATIENT)
Dept: CARDIOLOGY | Facility: CLINIC | Age: 55
End: 2020-06-23

## 2020-06-23 NOTE — TELEPHONE ENCOUNTER
----- Message from Eryn Munoz sent at 6/23/2020 10:53 AM CDT -----  Regarding: Pt Message  Type: Needs Medical Advice  Who Called:  Patient  Best Call Back Number: [phone]  Additional Information: Patient wants to r/s appt due to him being tested + for covid

## 2020-07-01 ENCOUNTER — TELEPHONE (OUTPATIENT)
Dept: FAMILY MEDICINE | Facility: CLINIC | Age: 55
End: 2020-07-01

## 2020-07-01 NOTE — TELEPHONE ENCOUNTER
Call placed to pharmacy, [facility] that medication has not previously been prescribed (that I can find) and to have patient contact office.   ----- Message from Guillermina Stewart sent at 7/1/2020  4:39 PM CDT -----  Regarding: Refill    Pt needs a refill on  sildenafil 100mg   called into pharmacy at  [facility] on [address] phone   [phone]    Pt is out of medication         Thank you!

## 2020-07-03 ENCOUNTER — PATIENT MESSAGE (OUTPATIENT)
Dept: FAMILY MEDICINE | Facility: CLINIC | Age: 55
End: 2020-07-03

## 2020-07-09 ENCOUNTER — LAB VISIT (OUTPATIENT)
Dept: LAB | Facility: HOSPITAL | Age: 55
End: 2020-07-09
Attending: FAMILY MEDICINE
Payer: COMMERCIAL

## 2020-07-09 DIAGNOSIS — Z20.822 CLOSE EXPOSURE TO COVID-19 VIRUS: ICD-10-CM

## 2020-07-09 LAB — SARS-COV-2 IGG SERPLBLD QL IA.RAPID: POSITIVE

## 2020-07-09 PROCEDURE — 86769 SARS-COV-2 COVID-19 ANTIBODY: CPT

## 2020-07-09 PROCEDURE — 36415 COLL VENOUS BLD VENIPUNCTURE: CPT | Mod: PO

## 2020-07-10 ENCOUNTER — TELEPHONE (OUTPATIENT)
Dept: CARDIOLOGY | Facility: CLINIC | Age: 55
End: 2020-07-10

## 2020-07-10 NOTE — PROGRESS NOTES
inform pt via phone that I reviewed the test results and note the following:    covid ab test was positive

## 2020-07-10 NOTE — TELEPHONE ENCOUNTER
----- Message from Richard MEZA Lindaeric sent at 7/10/2020  3:06 PM CDT -----  Regarding: Stress Echo Test  Contact: patient  Unsuccessful IM sent to office.  Patient called in and stated he needs to reschedule his Echo that is scheduled on 7/14/20 at 8:30am.  System would not allow us to reschedule.  Call back number is [phone]

## 2020-07-11 ENCOUNTER — TELEPHONE (OUTPATIENT)
Dept: FAMILY MEDICINE | Facility: CLINIC | Age: 55
End: 2020-07-11

## 2020-07-11 NOTE — TELEPHONE ENCOUNTER
----- Message from Shira Stewart sent at 7/10/2020  4:24 PM CDT -----  Regarding: rtc  Contact: SASHA MCCARTHY [[id number]]  Patient is returning nurse's phone call.  Please call patient back at [phone].

## 2020-07-13 ENCOUNTER — OFFICE VISIT (OUTPATIENT)
Dept: OPTOMETRY | Facility: CLINIC | Age: 55
End: 2020-07-13
Payer: COMMERCIAL

## 2020-07-13 ENCOUNTER — TELEPHONE (OUTPATIENT)
Dept: OPTOMETRY | Facility: CLINIC | Age: 55
End: 2020-07-13

## 2020-07-13 DIAGNOSIS — H02.055 TRICHIASIS OF LEFT LOWER EYELID WITHOUT ENTROPION: Primary | ICD-10-CM

## 2020-07-13 PROCEDURE — 99999 PR PBB SHADOW E&M-EST. PATIENT-LVL III: CPT | Mod: PBBFAC,,, | Performed by: OPTOMETRIST

## 2020-07-13 PROCEDURE — 67820 PR REVISE EYELASHES,FORCEPS: ICD-10-PCS | Mod: LT,S$GLB,, | Performed by: OPTOMETRIST

## 2020-07-13 PROCEDURE — 99499 NO LOS: ICD-10-PCS | Mod: S$GLB,,, | Performed by: OPTOMETRIST

## 2020-07-13 PROCEDURE — 99499 UNLISTED E&M SERVICE: CPT | Mod: S$GLB,,, | Performed by: OPTOMETRIST

## 2020-07-13 PROCEDURE — 67820 REVISE EYELASHES: CPT | Mod: LT,S$GLB,, | Performed by: OPTOMETRIST

## 2020-07-13 PROCEDURE — 99999 PR PBB SHADOW E&M-EST. PATIENT-LVL III: ICD-10-PCS | Mod: PBBFAC,,, | Performed by: OPTOMETRIST

## 2020-07-13 NOTE — PROGRESS NOTES
HPI     Urgent care Trichiasis OS    Pt complains of lash LLL x 2 days.    Last edited by Yue Rodriguez on 7/13/2020  3:16 PM. (History)        ROS     Positive for: Eyes    Negative for: Constitutional, Gastrointestinal, Neurological, Skin,   Genitourinary, Musculoskeletal, HENT, Endocrine, Cardiovascular,   Respiratory, Psychiatric, Allergic/Imm, Heme/Lymph    Last edited by MUKUND Virgen, OD on 7/13/2020  4:44 PM. (History)        Assessment /Plan     For exam results, see Encounter Report.    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

## 2020-07-13 NOTE — PATIENT INSTRUCTIONS
"DRY EYES -- BURNING OR CHARITY SYMPTOMS:  Use Over The Counter artificial tears as needed for dry eye symptoms.   Some common brands include:  Systane, Optive, Refresh, and Thera-Tears.  These drops can be used as frequently as desired, but may be most helpful use during long periods of concentrated work.  For example, reading / working at the computer. Start with 3-4x per day.     Nighttime Ophthalmic gel or ointments are available: Refresh PM, Genteal, and Lacrilube.    Avoid drops that "get redness out" (Visine, Murine, Clear Eyes), as these may contain medication that could further irritate the eyes, especially with chronic use.    ALLERGY EYES -- ITCHING SYMPTOMS:  Over the counter medications include--Pataday, Zaditor, and Alaway.  Use as directed 1-2 drops daily for symptoms of itching / watering eyes.  These drops will not help for dry eye or exposure symptoms.    REDNESS RELIEF:  Lumify---is a good redness reliever that will not cause irritation if used chronically.           "

## 2020-07-13 NOTE — TELEPHONE ENCOUNTER
----- Message from Aliyah Andrew sent at 7/13/2020  8:55 AM CDT -----  Type: Needs Medical Advice  Who Called:  pt  Symptoms (please be specific):    How long has patient had these symptoms:    Pharmacy name and phone #:    Best Call Back Number: [phone] (home) [phone] (work)    Additional Information: pt someone in provider office to contact him about his eye condition. thanks

## 2020-07-14 ENCOUNTER — TELEPHONE (OUTPATIENT)
Dept: CARDIOLOGY | Facility: CLINIC | Age: 55
End: 2020-07-14

## 2020-07-14 NOTE — TELEPHONE ENCOUNTER
----- Message from Aileen Palm sent at 7/14/2020  7:01 AM CDT -----  Regarding: Appt  Pt called to cancel his appt for today, but requests a returned call for reschedule.     He can be reached at [phone].    Thank you.

## 2020-07-21 ENCOUNTER — CLINICAL SUPPORT (OUTPATIENT)
Dept: CARDIOLOGY | Facility: CLINIC | Age: 55
End: 2020-07-21
Attending: INTERNAL MEDICINE
Payer: COMMERCIAL

## 2020-07-21 VITALS — WEIGHT: 167 LBS | HEIGHT: 66 IN | BODY MASS INDEX: 26.84 KG/M2

## 2020-07-21 DIAGNOSIS — R07.9 CHEST PAIN, UNSPECIFIED TYPE: ICD-10-CM

## 2020-07-21 DIAGNOSIS — R00.2 PALPITATIONS: ICD-10-CM

## 2020-07-21 DIAGNOSIS — E78.5 HYPERLIPIDEMIA, UNSPECIFIED HYPERLIPIDEMIA TYPE: ICD-10-CM

## 2020-07-21 PROCEDURE — 93351 STRESS TTE COMPLETE: CPT | Mod: S$GLB,,, | Performed by: INTERNAL MEDICINE

## 2020-07-21 PROCEDURE — 99999 PR PBB SHADOW E&M-EST. PATIENT-LVL I: ICD-10-PCS | Mod: PBBFAC,,,

## 2020-07-21 PROCEDURE — 93351 STRESS ECHO (CUPID ONLY): ICD-10-PCS | Mod: S$GLB,,, | Performed by: INTERNAL MEDICINE

## 2020-07-21 PROCEDURE — 99999 PR PBB SHADOW E&M-EST. PATIENT-LVL I: CPT | Mod: PBBFAC,,,

## 2020-07-23 LAB
ASCENDING AORTA: 2.39 CM
AV INDEX (PROSTH): 0.79
AV MEAN GRADIENT: 5 MMHG
AV PEAK GRADIENT: 8 MMHG
AV VALVE AREA: 2.86 CM2
AV VELOCITY RATIO: 0.73
BSA FOR ECHO PROCEDURE: 1.88 M2
CV ECHO LV RWT: 0.33 CM
CV STRESS BASE HR: 81 BPM
DIASTOLIC BLOOD PRESSURE: 78 MMHG
DOP CALC AO PEAK VEL: 1.45 M/S
DOP CALC AO VTI: 30.12 CM
DOP CALC LVOT AREA: 3.6 CM2
DOP CALC LVOT DIAMETER: 2.15 CM
DOP CALC LVOT PEAK VEL: 1.06 M/S
DOP CALC LVOT STROKE VOLUME: 86.18 CM3
DOP CALCLVOT PEAK VEL VTI: 23.75 CM
E WAVE DECELERATION TIME: 153 MSEC
E/A RATIO: 1.22
E/E' RATIO: 9.9 M/S
ECHO LV POSTERIOR WALL: 0.81 CM (ref 0.6–1.1)
FRACTIONAL SHORTENING: 40 % (ref 28–44)
INTERVENTRICULAR SEPTUM: 0.93 CM (ref 0.6–1.1)
IVRT: 94.2 MSEC
LA MAJOR: 3.65 CM
LA MINOR: 4.28 CM
LA WIDTH: 2.77 CM
LEFT ATRIUM SIZE: 3.45 CM
LEFT ATRIUM VOLUME INDEX: 17.3 ML/M2
LEFT ATRIUM VOLUME: 32 CM3
LEFT INTERNAL DIMENSION IN SYSTOLE: 2.92 CM (ref 2.1–4)
LEFT VENTRICLE DIASTOLIC VOLUME INDEX: 59.46 ML/M2
LEFT VENTRICLE DIASTOLIC VOLUME: 110.16 ML
LEFT VENTRICLE MASS INDEX: 78 G/M2
LEFT VENTRICLE SYSTOLIC VOLUME INDEX: 17.8 ML/M2
LEFT VENTRICLE SYSTOLIC VOLUME: 32.89 ML
LEFT VENTRICULAR INTERNAL DIMENSION IN DIASTOLE: 4.85 CM (ref 3.5–6)
LEFT VENTRICULAR MASS: 143.79 G
LV LATERAL E/E' RATIO: 8 M/S
LV SEPTAL E/E' RATIO: 13 M/S
MV PEAK A VEL: 0.85 M/S
MV PEAK E VEL: 1.04 M/S
OHS CV CPX 1 MINUTE RECOVERY HEART RATE: 116 BPM
OHS CV CPX 85 PERCENT MAX PREDICTED HEART RATE MALE: 139
OHS CV CPX ESTIMATED METS: 15
OHS CV CPX MAX PREDICTED HEART RATE: 164
OHS CV CPX PATIENT IS FEMALE: 0
OHS CV CPX PATIENT IS MALE: 1
OHS CV CPX PEAK DIASTOLIC BLOOD PRESSURE: 56 MMHG
OHS CV CPX PEAK HEAR RATE: 164 BPM
OHS CV CPX PEAK RATE PRESSURE PRODUCT: NORMAL
OHS CV CPX PEAK SYSTOLIC BLOOD PRESSURE: 188 MMHG
OHS CV CPX PERCENT MAX PREDICTED HEART RATE ACHIEVED: 100
OHS CV CPX RATE PRESSURE PRODUCT PRESENTING: NORMAL
PISA MRMAX VEL: 0.05 M/S
PISA TR MAX VEL: 2.64 M/S
PULM VEIN S/D RATIO: 0.82
PV PEAK D VEL: 0.56 M/S
PV PEAK S VEL: 0.46 M/S
RA MAJOR: 4.25 CM
RA PRESSURE: 3 MMHG
RA WIDTH: 3.33 CM
RIGHT VENTRICULAR END-DIASTOLIC DIMENSION: 2.81 CM
SINUS: 2.34 CM
STJ: 2.1 CM
STRESS ECHO POST EXERCISE DUR MIN: 8 MINUTES
STRESS ECHO POST EXERCISE DUR SEC: 34 SECONDS
SYSTOLIC BLOOD PRESSURE: 147 MMHG
TDI LATERAL: 0.13 M/S
TDI SEPTAL: 0.08 M/S
TDI: 0.11 M/S
TR MAX PG: 28 MMHG
TRICUSPID ANNULAR PLANE SYSTOLIC EXCURSION: 2.02 CM
TV REST PULMONARY ARTERY PRESSURE: 31 MMHG

## 2020-08-27 ENCOUNTER — OFFICE VISIT (OUTPATIENT)
Dept: FAMILY MEDICINE | Facility: CLINIC | Age: 55
End: 2020-08-27
Payer: COMMERCIAL

## 2020-08-27 VITALS
WEIGHT: 163.38 LBS | HEIGHT: 66 IN | TEMPERATURE: 98 F | OXYGEN SATURATION: 97 % | BODY MASS INDEX: 26.26 KG/M2 | DIASTOLIC BLOOD PRESSURE: 78 MMHG | HEART RATE: 65 BPM | SYSTOLIC BLOOD PRESSURE: 110 MMHG

## 2020-08-27 DIAGNOSIS — G47.00 INSOMNIA, UNSPECIFIED TYPE: Primary | ICD-10-CM

## 2020-08-27 PROCEDURE — 99213 OFFICE O/P EST LOW 20 MIN: CPT | Mod: S$GLB,,, | Performed by: INTERNAL MEDICINE

## 2020-08-27 PROCEDURE — 3008F BODY MASS INDEX DOCD: CPT | Mod: CPTII,S$GLB,, | Performed by: INTERNAL MEDICINE

## 2020-08-27 PROCEDURE — 99999 PR PBB SHADOW E&M-EST. PATIENT-LVL III: CPT | Mod: PBBFAC,,, | Performed by: INTERNAL MEDICINE

## 2020-08-27 PROCEDURE — 3008F PR BODY MASS INDEX (BMI) DOCUMENTED: ICD-10-PCS | Mod: CPTII,S$GLB,, | Performed by: INTERNAL MEDICINE

## 2020-08-27 PROCEDURE — 99999 PR PBB SHADOW E&M-EST. PATIENT-LVL III: ICD-10-PCS | Mod: PBBFAC,,, | Performed by: INTERNAL MEDICINE

## 2020-08-27 PROCEDURE — 99213 PR OFFICE/OUTPT VISIT, EST, LEVL III, 20-29 MIN: ICD-10-PCS | Mod: S$GLB,,, | Performed by: INTERNAL MEDICINE

## 2020-08-27 RX ORDER — TEMAZEPAM 15 MG/1
15 CAPSULE ORAL NIGHTLY PRN
Qty: 30 CAPSULE | Refills: 3 | Status: SHIPPED | OUTPATIENT
Start: 2020-08-27 | End: 2020-09-26

## 2020-08-27 NOTE — PROGRESS NOTES
Subjective:       Patient ID: Chino Ramirez is a 55 y.o. male.    Chief Complaint: Insomnia    Pt here today complaining of insomnia. He says he cannot fall asleep or stay asleep. He has tried trazodone, melatonin, otc sleep aids wihtout any benefit. He has a lot of stress right now with his job and son and this keeps him up at night. Not a heavy drinker- occassinally has a glass of red wine.     Review of Systems   Constitutional: Negative for fatigue, fever and unexpected weight change.   HENT: Negative for nasal congestion, postnasal drip and sore throat.    Eyes: Negative for visual disturbance.   Respiratory: Negative for cough, chest tightness, shortness of breath and wheezing.    Cardiovascular: Negative for chest pain, palpitations and leg swelling.   Gastrointestinal: Negative for abdominal pain, blood in stool, constipation, diarrhea, nausea and vomiting.   Endocrine: Negative for cold intolerance and heat intolerance.   Genitourinary: Negative for difficulty urinating, dysuria and frequency.   Musculoskeletal: Negative for back pain, joint swelling and myalgias.   Integumentary:  Negative for rash.   Allergic/Immunologic: Negative for environmental allergies.   Neurological: Negative for dizziness, seizures, numbness and headaches.   Hematological: Does not bruise/bleed easily.   Psychiatric/Behavioral: Negative for agitation and sleep disturbance.         Objective:      Physical Exam  Constitutional:       Appearance: He is well-developed.   HENT:      Head: Normocephalic and atraumatic.   Eyes:      Conjunctiva/sclera: Conjunctivae normal.      Pupils: Pupils are equal, round, and reactive to light.   Neck:      Musculoskeletal: Normal range of motion and neck supple.      Thyroid: No thyromegaly.   Cardiovascular:      Rate and Rhythm: Normal rate and regular rhythm.      Heart sounds: Normal heart sounds. No murmur. No friction rub. No gallop.    Pulmonary:      Effort: Pulmonary effort is  normal. No respiratory distress.      Breath sounds: Normal breath sounds. No wheezing or rales.   Abdominal:      General: Bowel sounds are normal. There is no distension.      Palpations: Abdomen is soft.      Tenderness: There is no abdominal tenderness.   Musculoskeletal: Normal range of motion.   Lymphadenopathy:      Cervical: No cervical adenopathy.   Skin:     General: Skin is warm and dry.   Neurological:      Mental Status: He is alert and oriented to person, place, and time.      Cranial Nerves: No cranial nerve deficit.   Psychiatric:         Behavior: Behavior normal.         Assessment:       1. Insomnia, unspecified type        Plan:       Insomnia- try restoril at 15. Pt to message me next week and let me know if working

## 2020-09-08 ENCOUNTER — OFFICE VISIT (OUTPATIENT)
Dept: OPTOMETRY | Facility: CLINIC | Age: 55
End: 2020-09-08
Payer: COMMERCIAL

## 2020-09-08 ENCOUNTER — TELEPHONE (OUTPATIENT)
Dept: OPTOMETRY | Facility: CLINIC | Age: 55
End: 2020-09-08

## 2020-09-08 DIAGNOSIS — H02.055 TRICHIASIS OF LEFT LOWER EYELID WITHOUT ENTROPION: Primary | ICD-10-CM

## 2020-09-08 DIAGNOSIS — S05.02XA: ICD-10-CM

## 2020-09-08 PROCEDURE — 92012 PR EYE EXAM, EST PATIENT,INTERMED: ICD-10-PCS | Mod: S$GLB,,, | Performed by: OPTOMETRIST

## 2020-09-08 PROCEDURE — 99999 PR PBB SHADOW E&M-EST. PATIENT-LVL III: CPT | Mod: PBBFAC,,, | Performed by: OPTOMETRIST

## 2020-09-08 PROCEDURE — 99999 PR PBB SHADOW E&M-EST. PATIENT-LVL III: ICD-10-PCS | Mod: PBBFAC,,, | Performed by: OPTOMETRIST

## 2020-09-08 PROCEDURE — 92012 INTRM OPH EXAM EST PATIENT: CPT | Mod: S$GLB,,, | Performed by: OPTOMETRIST

## 2020-09-08 NOTE — TELEPHONE ENCOUNTER
----- Message from Clover Laith sent at 9/8/2020  7:36 AM CDT -----  Regarding: access to an appt  Contact: pt  Type: Needs Medical Advice    Who Called:      Best Call Back Number:     Additional Information: Requesting a call back from Nurse, regarding pt wants to be seen today for an eye infection and pain ,please call back with advice asap         
H

## 2020-09-08 NOTE — PROGRESS NOTES
HPI     Urgent care-OS swollen and red    Complains of OS swollen and red x 3 days. Pt states girlfriend was pulling   lashes and accidentally poked eye. Feeling better today but wanted to get   checked. Eye is still red today.     Last edited by Yue Rodriguez on 9/8/2020  3:00 PM. (History)        ROS     Positive for: Eyes    Negative for: Constitutional, Gastrointestinal, Neurological, Skin,   Genitourinary, Musculoskeletal, HENT, Endocrine, Cardiovascular,   Respiratory, Psychiatric, Allergic/Imm, Heme/Lymph    Last edited by MUKUND Virgen, OD on 9/8/2020  3:36 PM. (History)        Assessment /Plan     For exam results, see Encounter Report.    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 Pt notes had several lashes pulled on own and eye feels better  Still w/ some inf spk as typical and 1 nub of cilia centrally that I could not grasp with forceps  Continue with ATs for comfort    2. Two small conj abrasions temporally, no heme, no fb  Defer Rx meds and  otc ophth zeke and instill tid or qid for 3-4 days  Knows to call/message if not improving    F/u prn

## 2020-11-12 ENCOUNTER — PATIENT OUTREACH (OUTPATIENT)
Dept: ADMINISTRATIVE | Facility: OTHER | Age: 55
End: 2020-11-12

## 2020-11-12 NOTE — PROGRESS NOTES
LINKS immunization registry not responding  Care Everywhere updated  Health Maintenance updated  Chart reviewed for overdue Proactive [facility] Encounters ([facility]) health maintenance testing (CRS, Breast Ca, Diabetic Eye Exam)   Orders entered:N/A

## 2020-11-13 ENCOUNTER — TELEPHONE (OUTPATIENT)
Dept: ORTHOPEDICS | Facility: CLINIC | Age: 55
End: 2020-11-13

## 2020-11-13 ENCOUNTER — OFFICE VISIT (OUTPATIENT)
Dept: ORTHOPEDICS | Facility: CLINIC | Age: 55
End: 2020-11-13
Payer: COMMERCIAL

## 2020-11-13 VITALS — BODY MASS INDEX: 26.26 KG/M2 | HEIGHT: 66 IN | WEIGHT: 163.38 LBS

## 2020-11-13 DIAGNOSIS — M25.562 LEFT KNEE PAIN, UNSPECIFIED CHRONICITY: Primary | ICD-10-CM

## 2020-11-13 PROCEDURE — 99213 PR OFFICE/OUTPT VISIT, EST, LEVL III, 20-29 MIN: ICD-10-PCS | Mod: S$GLB,,, | Performed by: ORTHOPAEDIC SURGERY

## 2020-11-13 PROCEDURE — 99213 OFFICE O/P EST LOW 20 MIN: CPT | Mod: S$GLB,,, | Performed by: ORTHOPAEDIC SURGERY

## 2020-11-13 PROCEDURE — 99999 PR PBB SHADOW E&M-EST. PATIENT-LVL III: ICD-10-PCS | Mod: PBBFAC,,, | Performed by: ORTHOPAEDIC SURGERY

## 2020-11-13 PROCEDURE — 3008F BODY MASS INDEX DOCD: CPT | Mod: CPTII,S$GLB,, | Performed by: ORTHOPAEDIC SURGERY

## 2020-11-13 PROCEDURE — 99999 PR PBB SHADOW E&M-EST. PATIENT-LVL III: CPT | Mod: PBBFAC,,, | Performed by: ORTHOPAEDIC SURGERY

## 2020-11-13 PROCEDURE — 3008F PR BODY MASS INDEX (BMI) DOCUMENTED: ICD-10-PCS | Mod: CPTII,S$GLB,, | Performed by: ORTHOPAEDIC SURGERY

## 2020-11-13 NOTE — TELEPHONE ENCOUNTER
Spoke to patient patient received paper work  ----- Message from Clover Ozuna sent at 11/13/2020  9:57 AM CST -----  Regarding: advice  Type: Needs Medical Advice    Who Called:      Best Call Back Number:     Additional Information: Requesting a call back from Nurse, regarding pt wants a handicap sticker  because of the pain in his knee ,please call back with advice

## 2020-11-13 NOTE — PROGRESS NOTES
55 years old with recurrent pain in the left knee for the past several weeks.  Noticed after running and doing jump rope.  Pain on medial-sided knee.  We had performed knee arthroscopy on this knee about 5 years ago is concerned about recurrent meniscal pathology.  Pain is 6 on bad days, 8 on bad days    Exam shows tenderness the medial joint line with positive Hyacinth testing, no signs infection or instability    Assessment:  Left knee pain probable recurrent meniscal tear    Plan:  MRI, follow-up after that to discuss the possibility of injections and therapy versus repeat arthroscopy

## 2020-11-19 ENCOUNTER — OFFICE VISIT (OUTPATIENT)
Dept: DERMATOLOGY | Facility: CLINIC | Age: 55
End: 2020-11-19
Payer: COMMERCIAL

## 2020-11-19 ENCOUNTER — PATIENT MESSAGE (OUTPATIENT)
Dept: DERMATOLOGY | Facility: CLINIC | Age: 55
End: 2020-11-19

## 2020-11-19 VITALS — HEIGHT: 66 IN | WEIGHT: 163 LBS | RESPIRATION RATE: 18 BRPM | BODY MASS INDEX: 26.2 KG/M2

## 2020-11-19 DIAGNOSIS — L25.5 CONTACT DERMATITIS DUE TO PLANTS, EXCEPT FOOD, UNSPECIFIED CONTACT DERMATITIS TYPE: Primary | ICD-10-CM

## 2020-11-19 DIAGNOSIS — D22.9 NEVUS: ICD-10-CM

## 2020-11-19 PROCEDURE — 1125F PR PAIN SEVERITY QUANTIFIED, PAIN PRESENT: ICD-10-PCS | Mod: S$GLB,,, | Performed by: DERMATOLOGY

## 2020-11-19 PROCEDURE — 99213 PR OFFICE/OUTPT VISIT, EST, LEVL III, 20-29 MIN: ICD-10-PCS | Mod: S$GLB,,, | Performed by: DERMATOLOGY

## 2020-11-19 PROCEDURE — 99999 PR PBB SHADOW E&M-EST. PATIENT-LVL III: ICD-10-PCS | Mod: PBBFAC,,, | Performed by: DERMATOLOGY

## 2020-11-19 PROCEDURE — 99213 OFFICE O/P EST LOW 20 MIN: CPT | Mod: S$GLB,,, | Performed by: DERMATOLOGY

## 2020-11-19 PROCEDURE — 99999 PR PBB SHADOW E&M-EST. PATIENT-LVL III: CPT | Mod: PBBFAC,,, | Performed by: DERMATOLOGY

## 2020-11-19 PROCEDURE — 1125F AMNT PAIN NOTED PAIN PRSNT: CPT | Mod: S$GLB,,, | Performed by: DERMATOLOGY

## 2020-11-19 PROCEDURE — 3008F BODY MASS INDEX DOCD: CPT | Mod: CPTII,S$GLB,, | Performed by: DERMATOLOGY

## 2020-11-19 PROCEDURE — 3008F PR BODY MASS INDEX (BMI) DOCUMENTED: ICD-10-PCS | Mod: CPTII,S$GLB,, | Performed by: DERMATOLOGY

## 2020-11-19 RX ORDER — CLOBETASOL PROPIONATE 0.5 MG/G
CREAM TOPICAL
Qty: 60 G | Refills: 3 | Status: SHIPPED | OUTPATIENT
Start: 2020-11-19 | End: 2023-04-27

## 2020-11-19 NOTE — PROGRESS NOTES
Subjective:       Patient ID:  Chino Ramirez is a 55 y.o. male who presents for   Chief Complaint   Patient presents with    Rash     Pt here for rash  LOV 4/5/2019    Rash on abdomen that began approx 1 week ago, c/o of itching to area, treated with OTC Benadryl spray, did not improve, pt reports he was in the woods prior to start of rash. Began using new lotion that is all natural for dry skin approx 2 month prior.    No PHX skin ca  No FHX melanoma           Review of Systems   Skin: Positive for itching, rash and activity-related sunscreen use. Negative for dry skin, sun sensitivity, daily sunscreen use, sensitivity to antibiotic ointment, sensitivity to bandage adhesive, tendency to form keloidal scars, recent sunburn, dry lips, abscesses and wears hat.   Hematologic/Lymphatic: Negative for adenopathy. Does not bruise/bleed easily.        Objective:    Physical Exam   Skin:   Areas Examined (abnormalities noted in diagram):   Head / Face Inspection Performed  Neck Inspection Performed  Chest / Axilla Inspection Performed  Abdomen Inspection Performed  Back Inspection Performed  RUE Inspected  LUE Inspection Performed              Diagram Legend     Erythematous scaling macule/papule c/w actinic keratosis       Vascular papule c/w angioma      Pigmented verrucoid papule/plaque c/w seborrheic keratosis      Yellow umbilicated papule c/w sebaceous hyperplasia      Irregularly shaped tan macule c/w lentigo     1-2 mm smooth white papules consistent with Milia      Movable subcutaneous cyst with punctum c/w epidermal inclusion cyst      Subcutaneous movable cyst c/w pilar cyst      Firm pink to brown papule c/w dermatofibroma      Pedunculated fleshy papule(s) c/w skin tag(s)      Evenly pigmented macule c/w junctional nevus     Mildly variegated pigmented, slightly irregular-bordered macule c/w mildly atypical nevus      Flesh colored to evenly pigmented papule c/w intradermal nevus       Pink pearly  papule/plaque c/w basal cell carcinoma      Erythematous hyperkeratotic cursted plaque c/w SCC      Surgical scar with no sign of skin cancer recurrence      Open and closed comedones      Inflammatory papules and pustules      Verrucoid papule consistent consistent with wart     Erythematous eczematous patches and plaques     Dystrophic onycholytic nail with subungual debris c/w onychomycosis     Umbilicated papule    Erythematous-base heme-crusted tan verrucoid plaque consistent with inflamed seborrheic keratosis     Erythematous Silvery Scaling Plaque c/w Psoriasis     See annotation      Assessment / Plan:        Contact dermatitis due to plants, except food, unspecified contact dermatitis type  -     clobetasoL (TEMOVATE) 0.05 % cream; AAA bid  Dispense: 60 g; Refill: 3    Localized only to umbilicus. Discussed possible reaction to nickel. Pt will monitor.   - I prescribed clobetasol cream to be applied twice daily to affected areas for two weeks then off. Repeat as needed.   - Side effects of topical steroids were reviewed with the patient including skin atrophy, striae, telangectasias and tachyphylaxis.   - wash clothes in hot water  - will call if itching keeps him up at night, consider atarax      Nevus  Discussed ABCDE's of nevi.  Monitor for new mole or moles that are becoming bigger, darker, irritated, or developing irregular borders. Brochure provided.               Follow up if symptoms worsen or fail to improve.

## 2020-11-20 ENCOUNTER — PATIENT MESSAGE (OUTPATIENT)
Dept: DERMATOLOGY | Facility: CLINIC | Age: 55
End: 2020-11-20

## 2020-11-20 RX ORDER — HYDROXYZINE HYDROCHLORIDE 25 MG/1
25 TABLET, FILM COATED ORAL NIGHTLY
Qty: 30 TABLET | Refills: 0 | Status: SHIPPED | OUTPATIENT
Start: 2020-11-20 | End: 2020-12-20

## 2020-11-20 NOTE — TELEPHONE ENCOUNTER
Spoke to Dr. Schultz regarding pt status.  Per Dr Schultz - suspect increase in redness at this time is due to path rash is taking.  Dr. Schultz states she sent in Atarax this morning. Pt on clobetasol cream for 1 day.  Encourage pt to use the cream as prescribed throughout the weekend and have pt send pictures through [facility] on Monday and call clinic.    Attempted to contact with Dr. Schultz's advice, left message for pt to return call.    Informed pt Dr. Schultz recommendations to continue clobetasol cream over the weekend, take Atarax as needed for itching, and to send pictures on Monday as well as call clinic if rash is still worsening.  Pt verbalized understanding and had no other concerns to address at this time.

## 2020-11-20 NOTE — TELEPHONE ENCOUNTER
Pt states he stopped using the prescription cream because he felt rash was getting worse with the cream.  Pt states rash became more red, more itchy and bumpy. Pt denies purulent drainage or fever.  Pt stated Dr. Schultz suggested possibly a pill at his visit, pt requesting a change in the rx'd cream. Informed pt that I will attempt to contact provider then call pt back with advice.

## 2020-11-23 ENCOUNTER — OFFICE VISIT (OUTPATIENT)
Dept: ORTHOPEDICS | Facility: CLINIC | Age: 55
End: 2020-11-23
Payer: COMMERCIAL

## 2020-11-23 VITALS — BODY MASS INDEX: 26.2 KG/M2 | HEIGHT: 66 IN | WEIGHT: 163 LBS

## 2020-11-23 DIAGNOSIS — M25.562 LEFT KNEE PAIN, UNSPECIFIED CHRONICITY: Primary | ICD-10-CM

## 2020-11-23 PROCEDURE — 99999 PR PBB SHADOW E&M-EST. PATIENT-LVL III: ICD-10-PCS | Mod: PBBFAC,,, | Performed by: ORTHOPAEDIC SURGERY

## 2020-11-23 PROCEDURE — 1125F AMNT PAIN NOTED PAIN PRSNT: CPT | Mod: S$GLB,,, | Performed by: ORTHOPAEDIC SURGERY

## 2020-11-23 PROCEDURE — 3008F BODY MASS INDEX DOCD: CPT | Mod: CPTII,S$GLB,, | Performed by: ORTHOPAEDIC SURGERY

## 2020-11-23 PROCEDURE — 99999 PR PBB SHADOW E&M-EST. PATIENT-LVL III: CPT | Mod: PBBFAC,,, | Performed by: ORTHOPAEDIC SURGERY

## 2020-11-23 PROCEDURE — 99213 PR OFFICE/OUTPT VISIT, EST, LEVL III, 20-29 MIN: ICD-10-PCS | Mod: S$GLB,,, | Performed by: ORTHOPAEDIC SURGERY

## 2020-11-23 PROCEDURE — 3008F PR BODY MASS INDEX (BMI) DOCUMENTED: ICD-10-PCS | Mod: CPTII,S$GLB,, | Performed by: ORTHOPAEDIC SURGERY

## 2020-11-23 PROCEDURE — 1125F PR PAIN SEVERITY QUANTIFIED, PAIN PRESENT: ICD-10-PCS | Mod: S$GLB,,, | Performed by: ORTHOPAEDIC SURGERY

## 2020-11-23 PROCEDURE — 99213 OFFICE O/P EST LOW 20 MIN: CPT | Mod: S$GLB,,, | Performed by: ORTHOPAEDIC SURGERY

## 2020-11-23 NOTE — PROGRESS NOTES
Follow-up of knee pain which has been present now for a few weeks time.  Recently got an MRI which showed a new tear of the posterior horn and body of the medial meniscus with extension to the inferior articular surface.  Also noted to have increased of chondromalacia along the patella as well as subchondral marrow edema in the medial tibial plateau    At this point a few weeks and we are going to treat conservatively with relative rest time and activity modifications.  If he remains symptomatic after extended period time will assume that the bone contusion has resolved and symptoms be remaining due to the meniscal tear we can treat accordingly

## 2021-03-25 ENCOUNTER — PATIENT OUTREACH (OUTPATIENT)
Dept: ADMINISTRATIVE | Facility: HOSPITAL | Age: 56
End: 2021-03-25

## 2021-04-14 DIAGNOSIS — Z12.11 COLON CANCER SCREENING: ICD-10-CM

## 2021-04-16 ENCOUNTER — PATIENT OUTREACH (OUTPATIENT)
Dept: ADMINISTRATIVE | Facility: HOSPITAL | Age: 56
End: 2021-04-16

## 2021-05-02 ENCOUNTER — PATIENT MESSAGE (OUTPATIENT)
Dept: ADMINISTRATIVE | Facility: HOSPITAL | Age: 56
End: 2021-05-02

## 2021-07-07 ENCOUNTER — PATIENT MESSAGE (OUTPATIENT)
Dept: ADMINISTRATIVE | Facility: HOSPITAL | Age: 56
End: 2021-07-07

## 2021-12-21 ENCOUNTER — PATIENT MESSAGE (OUTPATIENT)
Dept: ADMINISTRATIVE | Facility: HOSPITAL | Age: 56
End: 2021-12-21
Payer: COMMERCIAL

## 2021-12-21 ENCOUNTER — PATIENT OUTREACH (OUTPATIENT)
Dept: ADMINISTRATIVE | Facility: HOSPITAL | Age: 56
End: 2021-12-21
Payer: COMMERCIAL

## 2022-01-07 ENCOUNTER — PATIENT MESSAGE (OUTPATIENT)
Dept: FAMILY MEDICINE | Facility: CLINIC | Age: 57
End: 2022-01-07
Payer: COMMERCIAL

## 2022-01-07 DIAGNOSIS — Z00.00 ROUTINE MEDICAL EXAM: Primary | ICD-10-CM

## 2022-01-11 ENCOUNTER — PATIENT MESSAGE (OUTPATIENT)
Dept: ADMINISTRATIVE | Facility: HOSPITAL | Age: 57
End: 2022-01-11
Payer: COMMERCIAL

## 2022-01-11 ENCOUNTER — PATIENT OUTREACH (OUTPATIENT)
Dept: ADMINISTRATIVE | Facility: HOSPITAL | Age: 57
End: 2022-01-11
Payer: COMMERCIAL

## 2022-01-11 NOTE — PROGRESS NOTES
2022 Care Everywhere updates requested and reviewed.  Immunizations reconciled. Media reports reviewed.  Duplicate HM overrides and  orders removed.  Overdue HM topic chart audit and/or requested.  Overdue lab testing linked to upcoming lab appointments if applies.

## 2022-01-13 ENCOUNTER — PATIENT MESSAGE (OUTPATIENT)
Dept: ADMINISTRATIVE | Facility: HOSPITAL | Age: 57
End: 2022-01-13
Payer: COMMERCIAL

## 2022-01-13 NOTE — TELEPHONE ENCOUNTER
Please review portal msg.    Please enter labs needed.  He is also requesting a test for his blood type.

## 2022-01-20 ENCOUNTER — PATIENT MESSAGE (OUTPATIENT)
Dept: FAMILY MEDICINE | Facility: CLINIC | Age: 57
End: 2022-01-20
Payer: COMMERCIAL

## 2022-01-21 ENCOUNTER — LAB VISIT (OUTPATIENT)
Dept: LAB | Facility: HOSPITAL | Age: 57
End: 2022-01-21
Attending: FAMILY MEDICINE
Payer: COMMERCIAL

## 2022-01-21 DIAGNOSIS — Z00.00 ROUTINE MEDICAL EXAM: ICD-10-CM

## 2022-01-21 LAB
ABO + RH BLD: NORMAL
ALBUMIN SERPL BCP-MCNC: 3.8 G/DL (ref 3.5–5.2)
ALP SERPL-CCNC: 49 U/L (ref 55–135)
ALT SERPL W/O P-5'-P-CCNC: 24 U/L (ref 10–44)
ANION GAP SERPL CALC-SCNC: 8 MMOL/L (ref 8–16)
AST SERPL-CCNC: 24 U/L (ref 10–40)
BILIRUB SERPL-MCNC: 0.3 MG/DL (ref 0.1–1)
BUN SERPL-MCNC: 14 MG/DL (ref 6–20)
CALCIUM SERPL-MCNC: 9.2 MG/DL (ref 8.7–10.5)
CHLORIDE SERPL-SCNC: 105 MMOL/L (ref 95–110)
CHOLEST SERPL-MCNC: 180 MG/DL (ref 120–199)
CHOLEST/HDLC SERPL: 6.4 {RATIO} (ref 2–5)
CO2 SERPL-SCNC: 26 MMOL/L (ref 23–29)
COMPLEXED PSA SERPL-MCNC: 0.43 NG/ML (ref 0–4)
CREAT SERPL-MCNC: 1.3 MG/DL (ref 0.5–1.4)
EST. GFR  (AFRICAN AMERICAN): >60 ML/MIN/1.73 M^2
EST. GFR  (NON AFRICAN AMERICAN): >60 ML/MIN/1.73 M^2
GLUCOSE SERPL-MCNC: 97 MG/DL (ref 70–110)
HDLC SERPL-MCNC: 28 MG/DL (ref 40–75)
HDLC SERPL: 15.6 % (ref 20–50)
LDLC SERPL CALC-MCNC: 133.8 MG/DL (ref 63–159)
NONHDLC SERPL-MCNC: 152 MG/DL
POTASSIUM SERPL-SCNC: 4.4 MMOL/L (ref 3.5–5.1)
PROT SERPL-MCNC: 7.1 G/DL (ref 6–8.4)
SODIUM SERPL-SCNC: 139 MMOL/L (ref 136–145)
TRIGL SERPL-MCNC: 91 MG/DL (ref 30–150)

## 2022-01-21 PROCEDURE — 86901 BLOOD TYPING SEROLOGIC RH(D): CPT | Performed by: FAMILY MEDICINE

## 2022-01-21 PROCEDURE — 84153 ASSAY OF PSA TOTAL: CPT | Performed by: FAMILY MEDICINE

## 2022-01-21 PROCEDURE — 36415 COLL VENOUS BLD VENIPUNCTURE: CPT | Mod: PO | Performed by: FAMILY MEDICINE

## 2022-01-21 PROCEDURE — 80061 LIPID PANEL: CPT | Performed by: FAMILY MEDICINE

## 2022-01-21 PROCEDURE — 80053 COMPREHEN METABOLIC PANEL: CPT | Performed by: FAMILY MEDICINE

## 2022-01-25 ENCOUNTER — OFFICE VISIT (OUTPATIENT)
Dept: FAMILY MEDICINE | Facility: CLINIC | Age: 57
End: 2022-01-25
Payer: COMMERCIAL

## 2022-01-25 VITALS
SYSTOLIC BLOOD PRESSURE: 124 MMHG | HEART RATE: 65 BPM | OXYGEN SATURATION: 96 % | DIASTOLIC BLOOD PRESSURE: 80 MMHG | HEIGHT: 66 IN | BODY MASS INDEX: 26.79 KG/M2 | WEIGHT: 166.69 LBS

## 2022-01-25 DIAGNOSIS — S76.219A GROIN STRAIN, UNSPECIFIED LATERALITY, INITIAL ENCOUNTER: ICD-10-CM

## 2022-01-25 DIAGNOSIS — J30.9 ALLERGIC RHINITIS, UNSPECIFIED SEASONALITY, UNSPECIFIED TRIGGER: ICD-10-CM

## 2022-01-25 DIAGNOSIS — Z00.00 ROUTINE MEDICAL EXAM: Primary | ICD-10-CM

## 2022-01-25 DIAGNOSIS — Z12.11 COLON CANCER SCREENING: ICD-10-CM

## 2022-01-25 PROCEDURE — 99214 OFFICE O/P EST MOD 30 MIN: CPT | Mod: S$GLB,,, | Performed by: FAMILY MEDICINE

## 2022-01-25 PROCEDURE — 99999 PR PBB SHADOW E&M-EST. PATIENT-LVL III: CPT | Mod: PBBFAC,,, | Performed by: FAMILY MEDICINE

## 2022-01-25 PROCEDURE — 3079F DIAST BP 80-89 MM HG: CPT | Mod: CPTII,S$GLB,, | Performed by: FAMILY MEDICINE

## 2022-01-25 PROCEDURE — 99214 PR OFFICE/OUTPT VISIT, EST, LEVL IV, 30-39 MIN: ICD-10-PCS | Mod: S$GLB,,, | Performed by: FAMILY MEDICINE

## 2022-01-25 PROCEDURE — 3079F PR MOST RECENT DIASTOLIC BLOOD PRESSURE 80-89 MM HG: ICD-10-PCS | Mod: CPTII,S$GLB,, | Performed by: FAMILY MEDICINE

## 2022-01-25 PROCEDURE — 3008F PR BODY MASS INDEX (BMI) DOCUMENTED: ICD-10-PCS | Mod: CPTII,S$GLB,, | Performed by: FAMILY MEDICINE

## 2022-01-25 PROCEDURE — 3074F PR MOST RECENT SYSTOLIC BLOOD PRESSURE < 130 MM HG: ICD-10-PCS | Mod: CPTII,S$GLB,, | Performed by: FAMILY MEDICINE

## 2022-01-25 PROCEDURE — 99999 PR PBB SHADOW E&M-EST. PATIENT-LVL III: ICD-10-PCS | Mod: PBBFAC,,, | Performed by: FAMILY MEDICINE

## 2022-01-25 PROCEDURE — 3008F BODY MASS INDEX DOCD: CPT | Mod: CPTII,S$GLB,, | Performed by: FAMILY MEDICINE

## 2022-01-25 PROCEDURE — 3074F SYST BP LT 130 MM HG: CPT | Mod: CPTII,S$GLB,, | Performed by: FAMILY MEDICINE

## 2022-01-25 NOTE — PROGRESS NOTES
[facility]  History & Physical    Patient Name: Chino Ramirez  MRN: [id number]  Admission Date: 01/25/2022  Attending Physician:       PCP:     Víctor Jansen MD    CC:     Chief Complaint   Patient presents with    routine medical exam       HISTORY OF PRESENT ILLNESS:     Chino Ramirez is a 56 y.o. male that has a PMH of allergic rhinitis and has presented to the clinic for routine follow-up.    Allergic Rhinitis  -Pt taking Flonase, no symptoms on current regimen    Hip Pain  -10 weeks ago pt noted groin and back pain following a fall into the pool         REVIEW OF SYSTEMS:     Review of Systems   Constitutional: Negative for chills and fever.   HENT: Negative for congestion, hearing loss, sore throat and tinnitus.    Eyes: Negative for blurred vision and double vision.   Respiratory: Negative for cough and shortness of breath.    Cardiovascular: Negative for chest pain, palpitations and leg swelling.   Gastrointestinal: Negative for abdominal pain, constipation, diarrhea, nausea and vomiting.   Genitourinary: Negative.    Musculoskeletal:        Pt complains of a 10 week history of hip pain worsens on external rotation of leg   Skin: Negative for itching.   Neurological: Negative for dizziness, seizures, loss of consciousness, weakness and headaches.   Psychiatric/Behavioral: Negative.  Negative for depression and suicidal ideas.       PAST MEDICAL / SURGICAL HISTORY:     Past Medical History:   Diagnosis Date    Allergic rhinitis due to other allergen 8/11/2010    ED (erectile dysfunction)     HLD (hyperlipidemia) 1/2/2012    Hyperlipidemia     Hypogonadism male 1/2/2012    Osteoarthrosis, unspecified whether generalized or localized, lower leg 9/18/2014    PONV (postoperative nausea and vomiting)     Tendonitis 2014    right elbow     Past Surgical History:   Procedure Laterality Date    ADENOIDECTOMY      ARTHROSCOPY OF KNEE Right 7/18/2019    Procedure:  ARTHROSCOPY, KNEE;  Surgeon: Esa Lakhani MD;  Location: [facility] OR;  Service: Orthopedics;  Laterality: Right;    CARPAL TUNNEL RELEASE      EXCISION OF MEDIAL MENISCUS OF KNEE Right 7/18/2019    Procedure: MENISCECTOMY, KNEE, MEDIAL;  Surgeon: Esa Lakhani MD;  Location: [facility] OR;  Service: Orthopedics;  Laterality: Right;  partial meniscectomy    NASAL SINUS SURGERY           ALLERGIES:       Review of patient's allergies indicates:   Allergen Reactions    Cat hair extract      Other reaction(s): Sneezing           HOME MEDICATIONS:     Prior to Admission medications    Medication Sig Start Date End Date Taking? Authorizing Provider   clobetasoL (TEMOVATE) 0.05 % cream AAA bid 11/19/20  Yes Michelle Schultz MD   fluticasone (FLONASE) 50 mcg/actuation nasal spray 2 sprays by Each Nare route once daily.  Patient taking differently: 2 sprays by Each Nostril route daily as needed. 2/7/14  Yes Víctor Jansen MD   multivitamin capsule Take 1 capsule by mouth once daily.   Yes Historical Provider            PHYSICAL EXAM:     Wt Readings from Last 3 Encounters:   01/25/22 1535 75.6 kg (166 lb 10.7 oz)   11/23/20 1353 73.9 kg (163 lb)   11/19/20 1000 73.9 kg (163 lb)     Body mass index is 26.9 kg/m².    Vital Signs (Most Recent)  Pulse: 65 (01/25/22 1535)  BP: 124/80 (01/25/22 1535)  SpO2: 96 % (01/25/22 1535)      Physical Exam  Constitutional:       Appearance: Normal appearance.   Cardiovascular:      Rate and Rhythm: Normal rate and regular rhythm.      Pulses: Normal pulses.      Heart sounds: Normal heart sounds. No murmur heard.      Pulmonary:      Effort: Pulmonary effort is normal.      Breath sounds: Normal breath sounds.   Abdominal:      Palpations: Abdomen is soft.      Tenderness: There is no abdominal tenderness.   Neurological:      Mental Status: He is alert.               ASSESSMENT & PLAN:     Chino Ramirez is a 56 y.o. male that has a PMH of allergic rhinitis who has presented  to the clinic for routine follow up.    Hip Pain  -Pt counseled on certain activities to avoid for the next 6 weeks, lunges and squats  -Continue swimming and other exercises    Allergic Rhinitis  -Well controlled on Flonase    Colon Cancer screening  -Pt refused colonoscopy but is willing to do at home stool test  -Pt given packet for iFOBT    Vaccinations  -Pt refused Shingles and Influenza vaccinations        ===============================================================  Sean Smith, MS4  [employer]

## 2022-01-26 NOTE — PROGRESS NOTES
I have seen the patient, reviewed the Medical student's note, history and physical, assessment and plan. I have personally interviewed and examined the patient at bedside and agree with the findings unless outlined below.    1. Routine medical exam     2. Colon cancer screening  Fecal Immunochemical Test (iFOBT)   3. Allergic rhinitis, unspecified seasonality, unspecified trigger     4. Groin strain, unspecified laterality, initial encounter           ___________________  Víctor Jansen MD  Family Medicine    Recommend no squats or lunges regarding right groin strain  Labs reviewed and wnl  Cont current meds        Medication List with Changes/Refills   Current Medications    CLOBETASOL (TEMOVATE) 0.05 % CREAM    AAA bid    FLUTICASONE (FLONASE) 50 MCG/ACTUATION NASAL SPRAY    2 sprays by Each Nare route once daily.    MULTIVITAMIN CAPSULE    Take 1 capsule by mouth once daily.

## 2022-02-02 ENCOUNTER — LAB VISIT (OUTPATIENT)
Dept: LAB | Facility: HOSPITAL | Age: 57
End: 2022-02-02
Attending: FAMILY MEDICINE
Payer: COMMERCIAL

## 2022-02-02 DIAGNOSIS — Z12.11 COLON CANCER SCREENING: ICD-10-CM

## 2022-02-02 PROCEDURE — 82274 ASSAY TEST FOR BLOOD FECAL: CPT | Performed by: FAMILY MEDICINE

## 2022-02-10 LAB — HEMOCCULT STL QL IA: NEGATIVE

## 2022-03-23 ENCOUNTER — OFFICE VISIT (OUTPATIENT)
Dept: OPTOMETRY | Facility: CLINIC | Age: 57
End: 2022-03-23
Payer: COMMERCIAL

## 2022-03-23 DIAGNOSIS — Z13.5 GLAUCOMA SCREENING: ICD-10-CM

## 2022-03-23 DIAGNOSIS — H52.4 HYPEROPIA WITH PRESBYOPIA OF BOTH EYES: ICD-10-CM

## 2022-03-23 DIAGNOSIS — H02.055 TRICHIASIS OF LEFT LOWER EYELID WITHOUT ENTROPION: ICD-10-CM

## 2022-03-23 DIAGNOSIS — H52.03 HYPEROPIA WITH PRESBYOPIA OF BOTH EYES: ICD-10-CM

## 2022-03-23 DIAGNOSIS — H01.006 BLEPHARITIS OF BOTH EYES, UNSPECIFIED EYELID, UNSPECIFIED TYPE: ICD-10-CM

## 2022-03-23 DIAGNOSIS — H43.393 VITREOUS FLOATERS, BILATERAL: Primary | ICD-10-CM

## 2022-03-23 DIAGNOSIS — H01.003 BLEPHARITIS OF BOTH EYES, UNSPECIFIED EYELID, UNSPECIFIED TYPE: ICD-10-CM

## 2022-03-23 PROCEDURE — 92014 COMPRE OPH EXAM EST PT 1/>: CPT | Mod: S$GLB,,, | Performed by: OPTOMETRIST

## 2022-03-23 PROCEDURE — 92015 PR REFRACTION: ICD-10-PCS | Mod: S$GLB,,, | Performed by: OPTOMETRIST

## 2022-03-23 PROCEDURE — 99999 PR PBB SHADOW E&M-EST. PATIENT-LVL III: CPT | Mod: PBBFAC,,, | Performed by: OPTOMETRIST

## 2022-03-23 PROCEDURE — 92015 DETERMINE REFRACTIVE STATE: CPT | Mod: S$GLB,,, | Performed by: OPTOMETRIST

## 2022-03-23 PROCEDURE — 99999 PR PBB SHADOW E&M-EST. PATIENT-LVL III: ICD-10-PCS | Mod: PBBFAC,,, | Performed by: OPTOMETRIST

## 2022-03-23 PROCEDURE — 92014 PR EYE EXAM, EST PATIENT,COMPREHESV: ICD-10-PCS | Mod: S$GLB,,, | Performed by: OPTOMETRIST

## 2022-03-23 NOTE — PATIENT INSTRUCTIONS
"DRY EYES -- BURNING OR CHARITY SYMPTOMS:  Use Over The Counter artificial tears as needed for dry eye symptoms.   Some common brands include:  Systane, Optive, Refresh, and Thera-Tears.  These drops can be used as frequently as desired, but may be most helpful use during long periods of concentrated work.  For example, reading / working at the computer. Start with 3-4x per day.     Nighttime Ophthalmic gel or ointments are available: Refresh PM, Genteal, and Lacrilube.    Avoid drops that "get redness out" (Visine, Murine, Clear Eyes), as these may contain medication that could further irritate the eyes, especially with chronic use.    ALLERGY EYES -- ITCHING SYMPTOMS:  Over the counter medications include--Pataday, Zaditor, and Alaway.  Use as directed 1-2 drops daily for symptoms of itching / watering eyes.  These drops will not help for dry eye or exposure symptoms.    REDNESS RELIEF:  Lumify---is a good redness reliever that will not cause irritation if used chronically.        FLASHES / FLOATERS / POSTERIOR VITREOUS DETACHMENT    Call the clinic if you have any further changes in symptoms.  Including:  Increased numbers of floaters or flashing lights, dimness or darkness that moves through or stays constant in your vision, or any pain in the eye (s).    You may sometimes see small specks or clouds moving in your field of vision.  They are called FLOATERS.  You can often see them when looking at a plain background, like a blank wall or blue yohan.  Floaters are actually tiny clumps of gel or cells inside the VITREOUS, the clear jelly-like fluid that fills the inside of your eye.    While these objects look like they are in front of your eye, they are actually floating inside.  What you see are the shadows they cast on the RETINA, the nerve layer at the back of the eye that senses light and allows you to see.      POSTERIOR VITREOUS DETACHMENT    The appearance of new floaters may be alarming.  If you suddenly " develop new floaters, you should contact your eye care professional  right away.    The retina can tear if the shrinking vitreous pulls away from the wall of the eye.  This sometimes causes a small amount of bleeding in the eye that may appear as new floaters.    A torn retina is always a serious problem, since it can lead to a retinal detachment.  You should see your eye care professional as soon as possible if:    even one new floater appears suddenly;  you see sudden flashes of light;  you notice other symptoms, like the loss of side vision, or a curtain closes down in your vision        POSTERIOR VITREOUS DETACHMENT is more common for people who:    are nearsighted;  have had cataract surgery;  have had YAG laser surgery of the eye;  have had inflammation inside the eye;  are over age 60.      While some floaters may remain visible, many of them will fade over time and become less noticeable.  Even if you've had some floaters for years, you should have your eyes checked as soon as possible if you notice new ones.    FLASHING LIGHTS    When the vitreous gel rubs or pulls on the retina, you may see what look like flashing lights or lightning streaks.  These flashes can appear off and on for several weeks or months.      Some people experience flashes of light that appear as jagged lines or heat waves in both eyes, lasting 10-20 minutes.  These flashes are caused by a spasm of blood vessels in the brain, which is called a migraine.    If a headache follows these flashes, it's called a migraine headache.  If   no headache occurs, these flashes are called Ophthalmic or Ocular Migraine.      BLEPHARITIS & TREATMENT   Definition  Blepharitis is an inflammation of the eyelid margin, which causes itchy, irritated, and often red eyes. One common cause is staphylococcus, skin bacteria, which can thrive in these conditions and can possibly cause eye damage such as corneal scarring.   Occurrence  Blepharitis is a very  common problem seen particularly in people with a tendency toward oily skin, dandruff, or dry eyes. Though most frequently seen later in life, blepharitis can begin in childhood.  It can also be associated with hormonal changes (puberty, menopause), or changes in medications.  It is also common in patients with Rosacea.  Symptoms  Lid Crusting - The lids are often reddened, somewhat swollen and scaly appearing at the base of the lashes. These scales, as they become coarser, form crusts that cause the lids to stick together. This is especially prominent upon waking.   Itching - The inflammation of the lids will cause itching and irritation.   Tearing/ Light Sensitivity -The eyes may tear more frequently and may also be sensitive to bright light.  Treatment  Treatment is aimed at lowering the number of bacteria along the eyelid margin and decreasing the scales by lid hygiene and in some cases antibiotic/steroid medications. The goal is to control this problem and prevent it from becoming a more serious condition. Treatment is focused on keeping the condition under control by routine daily lid hygiene. Unfortunately, if the treatment is stopped the symptoms often recur.    Upon waking, place a clean, warm, wet, washcloth over your closed eyelids (upper and lower) for several minutes.  This may be accomplished by allowing warm shower water to run with eyes closed.  Place a small amount of diluted (1/4 strength) Baby Shampoo or a commercial lid cleaning solution on a cotton swab, washcloth, or your clean fingertip. Gently pull down on your lower lid and use a horizontal back and forth motion to scrub the edge of your lid at the base of the eyelashes. Do not scrub the inside of the lid or the skin on the outside. Close the eye and use the cotton swab to scrub along the base of the upper lid lashes. Rinse the shampoo away with warm water.   Additionally your doctor may ask that you use an antibiotic/steroid drop or  ointment for a few weeks. Use as directed.   Perform this procedure 2 times a day for the first 7 days and then cut back to once a day. (Or per your doctors recommendation.) You may need to continue lid scrubs on a daily basis, though some find they can successfully control their blepharitis symptoms with scrubs done 2 to 3 times a week.    Medication--use as directed if medication is prescribed    ________________________________________________________________________

## 2022-03-23 NOTE — PROGRESS NOTES
HPI     Annual eye exam    Pt. States near [place] seems to be worsening and he has to use readers more.  Would like a glasses rx today.  + having to pluck lashes LLL a couple times a month, but feels like he is   clear today with no FBS or irritation.   Denies flashes, floaters, pain, or use of gtts.     Last edited by Anh Gongora on 3/23/2022  9:00 AM. (History)        ROS     Positive for: Eyes    Negative for: Constitutional, Gastrointestinal, Neurological, Skin,   Genitourinary, Musculoskeletal, HENT, Endocrine, Cardiovascular,   Respiratory, Psychiatric, Allergic/Imm, Heme/Lymph    Last edited by MUKUND Virgen, OD on 3/23/2022  9:27 AM. (History)        Assessment /Plan     For exam results, see Encounter Report.    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 RD precautions given and reviewed. Patient knows to call/ message if any further changes in symptoms occur.  2. Lid hygiene, sheet given  Consider scrubs qhs or qod  ATs for comfort  Rosacea component?   3. Longstanding ---well controlled at home w/ epilation ---message if worsening episode   4. Not suspect  5. Update specs rx, gave copy    Discussed and educated patient on current findings /plan.  RTC 1 year, prn if any changes / issues

## 2022-04-28 ENCOUNTER — TELEPHONE (OUTPATIENT)
Dept: DERMATOLOGY | Facility: CLINIC | Age: 57
End: 2022-04-28
Payer: COMMERCIAL

## 2022-04-28 NOTE — TELEPHONE ENCOUNTER
----- Message from Sybil Suárez sent at 4/28/2022 12:58 PM CDT -----  Regarding: Patient call back  Who called:SASHA MCCARTHY [[id number]]    What is the request in detail: Patient is requesting a call back. Patient is having an issue with a dry patch and would like to schedule an appt. Attempted to schedule, none available. He would like to further discuss.   Please advise.    Can the clinic reply by [facility]? No    Best call back number: [phone]    Additional Information: N/A

## 2022-05-09 ENCOUNTER — PATIENT MESSAGE (OUTPATIENT)
Dept: SMOKING CESSATION | Facility: CLINIC | Age: 57
End: 2022-05-09
Payer: COMMERCIAL

## 2022-06-17 ENCOUNTER — OFFICE VISIT (OUTPATIENT)
Dept: DERMATOLOGY | Facility: CLINIC | Age: 57
End: 2022-06-17
Payer: COMMERCIAL

## 2022-06-17 VITALS — BODY MASS INDEX: 26.5 KG/M2 | RESPIRATION RATE: 18 BRPM | HEIGHT: 66 IN | WEIGHT: 164.88 LBS

## 2022-06-17 DIAGNOSIS — L81.4 LENTIGO: ICD-10-CM

## 2022-06-17 DIAGNOSIS — Z12.83 ENCOUNTER FOR SCREENING FOR MALIGNANT NEOPLASM OF SKIN: Primary | ICD-10-CM

## 2022-06-17 DIAGNOSIS — L82.1 SEBORRHEIC KERATOSES: ICD-10-CM

## 2022-06-17 DIAGNOSIS — D22.9 MULTIPLE BENIGN NEVI: ICD-10-CM

## 2022-06-17 PROCEDURE — 99999 PR PBB SHADOW E&M-EST. PATIENT-LVL III: ICD-10-PCS | Mod: PBBFAC,,, | Performed by: STUDENT IN AN ORGANIZED HEALTH CARE EDUCATION/TRAINING PROGRAM

## 2022-06-17 PROCEDURE — 1160F RVW MEDS BY RX/DR IN RCRD: CPT | Mod: CPTII,S$GLB,, | Performed by: STUDENT IN AN ORGANIZED HEALTH CARE EDUCATION/TRAINING PROGRAM

## 2022-06-17 PROCEDURE — 99999 PR PBB SHADOW E&M-EST. PATIENT-LVL III: CPT | Mod: PBBFAC,,, | Performed by: STUDENT IN AN ORGANIZED HEALTH CARE EDUCATION/TRAINING PROGRAM

## 2022-06-17 PROCEDURE — 1159F MED LIST DOCD IN RCRD: CPT | Mod: CPTII,S$GLB,, | Performed by: STUDENT IN AN ORGANIZED HEALTH CARE EDUCATION/TRAINING PROGRAM

## 2022-06-17 PROCEDURE — 3008F BODY MASS INDEX DOCD: CPT | Mod: CPTII,S$GLB,, | Performed by: STUDENT IN AN ORGANIZED HEALTH CARE EDUCATION/TRAINING PROGRAM

## 2022-06-17 PROCEDURE — 99213 PR OFFICE/OUTPT VISIT, EST, LEVL III, 20-29 MIN: ICD-10-PCS | Mod: S$GLB,,, | Performed by: STUDENT IN AN ORGANIZED HEALTH CARE EDUCATION/TRAINING PROGRAM

## 2022-06-17 PROCEDURE — 1160F PR REVIEW ALL MEDS BY PRESCRIBER/CLIN PHARMACIST DOCUMENTED: ICD-10-PCS | Mod: CPTII,S$GLB,, | Performed by: STUDENT IN AN ORGANIZED HEALTH CARE EDUCATION/TRAINING PROGRAM

## 2022-06-17 PROCEDURE — 99213 OFFICE O/P EST LOW 20 MIN: CPT | Mod: S$GLB,,, | Performed by: STUDENT IN AN ORGANIZED HEALTH CARE EDUCATION/TRAINING PROGRAM

## 2022-06-17 PROCEDURE — 3008F PR BODY MASS INDEX (BMI) DOCUMENTED: ICD-10-PCS | Mod: CPTII,S$GLB,, | Performed by: STUDENT IN AN ORGANIZED HEALTH CARE EDUCATION/TRAINING PROGRAM

## 2022-06-17 PROCEDURE — 1159F PR MEDICATION LIST DOCUMENTED IN MEDICAL RECORD: ICD-10-PCS | Mod: CPTII,S$GLB,, | Performed by: STUDENT IN AN ORGANIZED HEALTH CARE EDUCATION/TRAINING PROGRAM

## 2022-06-17 NOTE — PROGRESS NOTES
Subjective:       Patient ID:  Chino Ramirez is a 56 y.o. male who presents for   Chief Complaint   Patient presents with    Skin Check     Patient present for skin check. LOV 11/19/20 by Dr. Schultz.    C/o spot on left side X years. Pt states spot give some discomfort increasing in size.  C/o spot on right cheek X years.      No PHX skin ca  No FHX melanoma    Past Medical History:  8/11/2010: Allergic rhinitis due to other allergen  No date: ED (erectile dysfunction)  1/2/2012: HLD (hyperlipidemia)  No date: Hyperlipidemia  1/2/2012: Hypogonadism male  9/18/2014: Osteoarthrosis, unspecified whether generalized or   localized, lower leg  No date: PONV (postoperative nausea and vomiting)  2014: Tendonitis      Comment:  right elbow        Review of Systems   Constitutional: Negative.    Skin: Positive for itching, rash and activity-related sunscreen use. Negative for dry skin, sun sensitivity, daily sunscreen use, sensitivity to antibiotic ointment, sensitivity to bandage adhesive, tendency to form keloidal scars, recent sunburn, dry lips, abscesses and wears hat.   Hematologic/Lymphatic: Negative for adenopathy. Does not bruise/bleed easily.        Objective:    Physical Exam   Constitutional: He appears well-developed and well-nourished. No distress.   Neurological: He is alert. He is not disoriented.   Psychiatric: He has a normal mood and affect.   Skin:   Areas Examined (abnormalities noted in diagram):   Scalp / Hair Palpated and Inspected  Head / Face Inspection Performed  Neck Inspection Performed  Chest / Axilla Inspection Performed  Abdomen Inspection Performed  Back Inspection Performed  RUE Inspected  LUE Inspection Performed                   Diagram Legend     Erythematous scaling macule/papule c/w actinic keratosis       Vascular papule c/w angioma      Pigmented verrucoid papule/plaque c/w seborrheic keratosis      Yellow umbilicated papule c/w sebaceous hyperplasia      Irregularly shaped  tan macule c/w lentigo     1-2 mm smooth white papules consistent with Milia      Movable subcutaneous cyst with punctum c/w epidermal inclusion cyst      Subcutaneous movable cyst c/w pilar cyst      Firm pink to brown papule c/w dermatofibroma      Pedunculated fleshy papule(s) c/w skin tag(s)      Evenly pigmented macule c/w junctional nevus     Mildly variegated pigmented, slightly irregular-bordered macule c/w mildly atypical nevus      Flesh colored to evenly pigmented papule c/w intradermal nevus       Pink pearly papule/plaque c/w basal cell carcinoma      Erythematous hyperkeratotic cursted plaque c/w SCC      Surgical scar with no sign of skin cancer recurrence      Open and closed comedones      Inflammatory papules and pustules      Verrucoid papule consistent consistent with wart     Erythematous eczematous patches and plaques     Dystrophic onycholytic nail with subungual debris c/w onychomycosis     Umbilicated papule    Erythematous-base heme-crusted tan verrucoid plaque consistent with inflamed seborrheic keratosis     Erythematous Silvery Scaling Plaque c/w Psoriasis     See annotation      Assessment / Plan:        Encounter for screening for malignant neoplasm of skin  Upper body skin examination performed today including at least 6 points as noted in physical examination. No lesions suspicious for malignancy noted.    Recommend daily sun protection/avoidance and use of at least SPF 30, broad spectrum sunscreen (OTC drug).     Lentigo  This is a benign hyperpigmented sun induced lesion. Recommend daily sun protection/avoidance and use of at least SPF 30, broad spectrum sunscreen (OTC drug) will reduce the number of new lesions. Treatment of these benign lesions are considered cosmetic.    Seborrheic keratoses  These are benign inherited growths without a malignant potential. Reassurance given to patient. No treatment is necessary.     Multiple benign nevi  Discussed ABCDE's of nevi.  Monitor for  new mole or moles that are becoming bigger, darker, irritated, or developing irregular borders. Brochure provided. Instructed patient to observe lesion(s) for changes and follow up in clinic if changes are noted. Patient to monitor skin at home for new or changing lesions.              No follow-ups on file.

## 2022-12-28 ENCOUNTER — TELEPHONE (OUTPATIENT)
Dept: OPTOMETRY | Facility: CLINIC | Age: 57
End: 2022-12-28
Payer: COMMERCIAL

## 2022-12-28 NOTE — TELEPHONE ENCOUNTER
----- Message from Gucci Stokes sent at 12/28/2022  2:14 PM CST -----  Type:  Sooner Appointment Request    Caller is requesting a sooner appointment.  Caller declined first available appointment listed below.  Caller will not accept being placed on the waitlist and is requesting a message be sent to doctor.    Name of Caller:  Pt  When is the first available appointment?  1/4  Symptoms:  issues with eye lashes   Best Call Back Number:  [phone]  Additional Information:  Sts Dr. Virgen knows whats going on has had the issue prior.  Please advise - Thank you

## 2023-01-04 ENCOUNTER — PATIENT MESSAGE (OUTPATIENT)
Dept: FAMILY MEDICINE | Facility: CLINIC | Age: 58
End: 2023-01-04
Payer: COMMERCIAL

## 2023-01-04 ENCOUNTER — OFFICE VISIT (OUTPATIENT)
Dept: OPTOMETRY | Facility: CLINIC | Age: 58
End: 2023-01-04
Payer: COMMERCIAL

## 2023-01-04 DIAGNOSIS — H11.232 SYMBLEPHARON OF LEFT EYE: Primary | ICD-10-CM

## 2023-01-04 PROCEDURE — 1159F PR MEDICATION LIST DOCUMENTED IN MEDICAL RECORD: ICD-10-PCS | Mod: CPTII,S$GLB,, | Performed by: OPTOMETRIST

## 2023-01-04 PROCEDURE — 1159F MED LIST DOCD IN RCRD: CPT | Mod: CPTII,S$GLB,, | Performed by: OPTOMETRIST

## 2023-01-04 PROCEDURE — 99999 PR PBB SHADOW E&M-EST. PATIENT-LVL III: CPT | Mod: PBBFAC,,, | Performed by: OPTOMETRIST

## 2023-01-04 PROCEDURE — 92012 INTRM OPH EXAM EST PATIENT: CPT | Mod: S$GLB,,, | Performed by: OPTOMETRIST

## 2023-01-04 PROCEDURE — 92012 PR EYE EXAM, EST PATIENT,INTERMED: ICD-10-PCS | Mod: S$GLB,,, | Performed by: OPTOMETRIST

## 2023-01-04 PROCEDURE — 99999 PR PBB SHADOW E&M-EST. PATIENT-LVL III: ICD-10-PCS | Mod: PBBFAC,,, | Performed by: OPTOMETRIST

## 2023-01-04 RX ORDER — METOPROLOL SUCCINATE 25 MG/1
25 TABLET, EXTENDED RELEASE ORAL NIGHTLY
COMMUNITY
Start: 2022-09-03 | End: 2023-01-17

## 2023-01-04 NOTE — PROGRESS NOTES
HPI    Last DFE 3/23/22  + Trauma to OS 1997 with orbital fracture  + Electrolysis to lids OS    Pt. States he has lashes that grow in towards OS off and on that he states   he plucks out himself and has Dr. Virgen pluck when he can not get them   out since trauma to OS 1997.  States he noticed some scar tissue under LLL the other day that he states   he can feel at times.  Denies flashes, floaters, pain, or use of gtts.   Last edited by Anh Gongora on 1/4/2023  4:39 PM.        ROS    Positive for: Eyes  Negative for: Constitutional, Gastrointestinal, Neurological, Skin,   Genitourinary, Musculoskeletal, HENT, Endocrine, Cardiovascular,   Respiratory, Psychiatric, Allergic/Imm, Heme/Lymph  Last edited by MUKUND Virgen, OD on 1/4/2023  4:51 PM.        Assessment /Plan     For exam results, see Encounter Report.    Symblepharon of left eye  -     Ambulatory referral/consult to Ophthalmology; Future; Expected date: 01/11/2023      Chronic lid: GD/ bleph / rosacea   Trichiasis managed     Small symblepharon inf/ temporal OS   Refer for excision / treatment

## 2023-01-09 ENCOUNTER — PATIENT MESSAGE (OUTPATIENT)
Dept: FAMILY MEDICINE | Facility: CLINIC | Age: 58
End: 2023-01-09
Payer: COMMERCIAL

## 2023-01-09 ENCOUNTER — TELEPHONE (OUTPATIENT)
Dept: OPHTHALMOLOGY | Facility: CLINIC | Age: 58
End: 2023-01-09
Payer: COMMERCIAL

## 2023-01-09 ENCOUNTER — TELEPHONE (OUTPATIENT)
Dept: CARDIOLOGY | Facility: CLINIC | Age: 58
End: 2023-01-09
Payer: COMMERCIAL

## 2023-01-09 ENCOUNTER — PATIENT MESSAGE (OUTPATIENT)
Dept: OPTOMETRY | Facility: CLINIC | Age: 58
End: 2023-01-09
Payer: COMMERCIAL

## 2023-01-09 NOTE — TELEPHONE ENCOUNTER
----- Message from Maria Eugenia Geiger sent at 1/9/2023  9:42 AM CST -----  Type:  Patient Returning Call    Who Called:  pt  Who Left Message for Patient:  Helene  Does the patient know what this is regarding?:  yes  Best Call Back Number:  [phone]    Additional Information:  please call and advise--thank you

## 2023-01-10 ENCOUNTER — PATIENT MESSAGE (OUTPATIENT)
Dept: FAMILY MEDICINE | Facility: CLINIC | Age: 58
End: 2023-01-10
Payer: COMMERCIAL

## 2023-01-10 ENCOUNTER — PATIENT MESSAGE (OUTPATIENT)
Dept: ADMINISTRATIVE | Facility: HOSPITAL | Age: 58
End: 2023-01-10
Payer: COMMERCIAL

## 2023-01-10 ENCOUNTER — PATIENT MESSAGE (OUTPATIENT)
Dept: OPTOMETRY | Facility: CLINIC | Age: 58
End: 2023-01-10
Payer: COMMERCIAL

## 2023-01-13 ENCOUNTER — OFFICE VISIT (OUTPATIENT)
Dept: OPHTHALMOLOGY | Facility: CLINIC | Age: 58
End: 2023-01-13
Payer: COMMERCIAL

## 2023-01-13 DIAGNOSIS — H11.232 SYMBLEPHARON, LEFT: Primary | ICD-10-CM

## 2023-01-13 PROCEDURE — 99204 PR OFFICE/OUTPT VISIT, NEW, LEVL IV, 45-59 MIN: ICD-10-PCS | Mod: 57,S$GLB,, | Performed by: OPHTHALMOLOGY

## 2023-01-13 PROCEDURE — 99204 OFFICE O/P NEW MOD 45 MIN: CPT | Mod: 57,S$GLB,, | Performed by: OPHTHALMOLOGY

## 2023-01-13 PROCEDURE — 99999 PR PBB SHADOW E&M-EST. PATIENT-LVL II: ICD-10-PCS | Mod: PBBFAC,,, | Performed by: OPHTHALMOLOGY

## 2023-01-13 PROCEDURE — 68340 SEPARATE EYELID ADHESIONS: CPT | Mod: LT,S$GLB,, | Performed by: OPHTHALMOLOGY

## 2023-01-13 PROCEDURE — 68340 PR DIVISION OF SYMBLEPHARON: ICD-10-PCS | Mod: LT,S$GLB,, | Performed by: OPHTHALMOLOGY

## 2023-01-13 PROCEDURE — 99999 PR PBB SHADOW E&M-EST. PATIENT-LVL II: CPT | Mod: PBBFAC,,, | Performed by: OPHTHALMOLOGY

## 2023-01-13 NOTE — PROGRESS NOTES
HPI     Symblepharon LLL      Additional comments: Per            Comments    + Trauma to OS 1997 with orbital fracture   + Electrolysis to lids OS    Patient states he has some scar tissues LLL. Occasional discomfort OS.          Last edited by Shahzad Sandoval MA on 1/13/2023 10:23 AM.            Assessment /Plan     For exam results, see Encounter Report.    Symblepharon, left      Symblepharon LLL lysed at slit lamp with no c/o. With a/p abx.    BCL placed to help with discomfort and minimize risk of recurrence.    F/up Param 1 wk to remove BCL    Ocuflox 2-3x/day until BCL removed.

## 2023-01-15 DIAGNOSIS — Z12.11 SCREENING FOR COLON CANCER: ICD-10-CM

## 2023-01-17 ENCOUNTER — TELEPHONE (OUTPATIENT)
Dept: OPTOMETRY | Facility: CLINIC | Age: 58
End: 2023-01-17
Payer: COMMERCIAL

## 2023-01-17 ENCOUNTER — OFFICE VISIT (OUTPATIENT)
Dept: OPTOMETRY | Facility: CLINIC | Age: 58
End: 2023-01-17
Payer: COMMERCIAL

## 2023-01-17 ENCOUNTER — OFFICE VISIT (OUTPATIENT)
Dept: FAMILY MEDICINE | Facility: CLINIC | Age: 58
End: 2023-01-17
Payer: COMMERCIAL

## 2023-01-17 ENCOUNTER — PATIENT MESSAGE (OUTPATIENT)
Dept: FAMILY MEDICINE | Facility: CLINIC | Age: 58
End: 2023-01-17

## 2023-01-17 VITALS
SYSTOLIC BLOOD PRESSURE: 120 MMHG | BODY MASS INDEX: 26.43 KG/M2 | WEIGHT: 164.44 LBS | TEMPERATURE: 98 F | DIASTOLIC BLOOD PRESSURE: 82 MMHG | HEIGHT: 66 IN | HEART RATE: 62 BPM | OXYGEN SATURATION: 98 %

## 2023-01-17 DIAGNOSIS — E83.19 IRON OVERLOAD: ICD-10-CM

## 2023-01-17 DIAGNOSIS — H11.232 SYMBLEPHARON OF LEFT EYE: Primary | ICD-10-CM

## 2023-01-17 DIAGNOSIS — D58.2 ELEVATED HEMOGLOBIN: ICD-10-CM

## 2023-01-17 DIAGNOSIS — E78.5 HYPERLIPIDEMIA, UNSPECIFIED HYPERLIPIDEMIA TYPE: Primary | ICD-10-CM

## 2023-01-17 PROCEDURE — 99999 PR PBB SHADOW E&M-EST. PATIENT-LVL III: ICD-10-PCS | Mod: PBBFAC,,,

## 2023-01-17 PROCEDURE — 3074F PR MOST RECENT SYSTOLIC BLOOD PRESSURE < 130 MM HG: ICD-10-PCS | Mod: CPTII,S$GLB,, | Performed by: NURSE PRACTITIONER

## 2023-01-17 PROCEDURE — 3074F SYST BP LT 130 MM HG: CPT | Mod: CPTII,S$GLB,, | Performed by: NURSE PRACTITIONER

## 2023-01-17 PROCEDURE — 3079F PR MOST RECENT DIASTOLIC BLOOD PRESSURE 80-89 MM HG: ICD-10-PCS | Mod: CPTII,S$GLB,, | Performed by: NURSE PRACTITIONER

## 2023-01-17 PROCEDURE — 1160F PR REVIEW ALL MEDS BY PRESCRIBER/CLIN PHARMACIST DOCUMENTED: ICD-10-PCS | Mod: CPTII,S$GLB,, | Performed by: NURSE PRACTITIONER

## 2023-01-17 PROCEDURE — 92012 PR EYE EXAM, EST PATIENT,INTERMED: ICD-10-PCS | Mod: S$GLB,,,

## 2023-01-17 PROCEDURE — 92012 INTRM OPH EXAM EST PATIENT: CPT | Mod: S$GLB,,,

## 2023-01-17 PROCEDURE — 99999 PR PBB SHADOW E&M-EST. PATIENT-LVL III: ICD-10-PCS | Mod: PBBFAC,,, | Performed by: NURSE PRACTITIONER

## 2023-01-17 PROCEDURE — 3008F PR BODY MASS INDEX (BMI) DOCUMENTED: ICD-10-PCS | Mod: CPTII,S$GLB,, | Performed by: NURSE PRACTITIONER

## 2023-01-17 PROCEDURE — 99214 OFFICE O/P EST MOD 30 MIN: CPT | Mod: S$GLB,,, | Performed by: NURSE PRACTITIONER

## 2023-01-17 PROCEDURE — 1159F MED LIST DOCD IN RCRD: CPT | Mod: CPTII,S$GLB,, | Performed by: NURSE PRACTITIONER

## 2023-01-17 PROCEDURE — 99999 PR PBB SHADOW E&M-EST. PATIENT-LVL III: CPT | Mod: PBBFAC,,,

## 2023-01-17 PROCEDURE — 99214 PR OFFICE/OUTPT VISIT, EST, LEVL IV, 30-39 MIN: ICD-10-PCS | Mod: S$GLB,,, | Performed by: NURSE PRACTITIONER

## 2023-01-17 PROCEDURE — 1160F RVW MEDS BY RX/DR IN RCRD: CPT | Mod: CPTII,S$GLB,, | Performed by: NURSE PRACTITIONER

## 2023-01-17 PROCEDURE — 3008F BODY MASS INDEX DOCD: CPT | Mod: CPTII,S$GLB,, | Performed by: NURSE PRACTITIONER

## 2023-01-17 PROCEDURE — 1159F PR MEDICATION LIST DOCUMENTED IN MEDICAL RECORD: ICD-10-PCS | Mod: CPTII,S$GLB,, | Performed by: NURSE PRACTITIONER

## 2023-01-17 PROCEDURE — 99999 PR PBB SHADOW E&M-EST. PATIENT-LVL III: CPT | Mod: PBBFAC,,, | Performed by: NURSE PRACTITIONER

## 2023-01-17 PROCEDURE — 3079F DIAST BP 80-89 MM HG: CPT | Mod: CPTII,S$GLB,, | Performed by: NURSE PRACTITIONER

## 2023-01-17 RX ORDER — HYDROXYZINE PAMOATE 25 MG/1
25 CAPSULE ORAL NIGHTLY PRN
Qty: 30 CAPSULE | Refills: 3 | Status: SHIPPED | OUTPATIENT
Start: 2023-01-17 | End: 2023-04-27

## 2023-01-17 RX ORDER — OFLOXACIN 3 MG/ML
1 SOLUTION/ DROPS OPHTHALMIC 3 TIMES DAILY
COMMUNITY
End: 2023-06-19 | Stop reason: SDUPTHER

## 2023-01-17 NOTE — PROGRESS NOTES
Subjective:       Patient ID: Chino Ramirez is a 57 y.o. male.    Chief Complaint: problems sleeping    Patient had labs done at [facility] about 2-3 weeks ago, had an elevated H&H, elevated iron 297. HDL was 15, triglycerides were 641. Takes krill oil, Co Q 10, Saw palmeto. Denies taking any iron or testosterone supplements. He does drink 3 alcoholic drinks daily. Not taking any oral prescription medications. No abnormal symptoms.     Past Medical History:  08/11/2010: Allergic rhinitis due to other allergen  No date: ED (erectile dysfunction)  01/02/2012: HLD (hyperlipidemia)  No date: Hyperlipidemia  01/02/2012: Hypogonadism male  09/18/2014: Osteoarthrosis, unspecified whether generalized or   localized, lower leg  No date: PONV (postoperative nausea and vomiting)  2014: Tendonitis      Comment:  right elbow    Past Surgical History:  No date: ADENOIDECTOMY  07/18/2019: ARTHROSCOPY OF KNEE; Right      Comment:  Procedure: ARTHROSCOPY, KNEE;  Surgeon: Esa Lakhani MD;  Location: [facility] OR;  Service: Orthopedics;                 Laterality: Right;  No date: CARPAL TUNNEL RELEASE  07/18/2019: EXCISION OF MEDIAL MENISCUS OF KNEE; Right      Comment:  Procedure: MENISCECTOMY, KNEE, MEDIAL;  Surgeon: Esa Lakhani MD;  Location: [facility] OR;  Service:                Orthopedics;  Laterality: Right;  partial meniscectomy  No date: NASAL SINUS SURGERY  3311-8470: ORBITAL FRACTURE SURGERY; Left    Review of patient's family history indicates:      Social History    Socioeconomic History      Marital status: Single      Number of children: 2    Occupational History        Employer: [employer]    Tobacco Use      Smoking status: Never      Smokeless tobacco: Never    Substance and Sexual Activity      Alcohol use: Yes        Alcohol/week: 3.0 standard drinks        Types: 2 Glasses of wine, 1 Shots of liquor per week        Comment: 2-3 x week      Drug use: No      Sexual activity: Yes         Partners: Female    Social History Narrative      Regular exercise 2-3 days a week, weights and cardio      No dietary restrictions      Current Outpatient Medications:  clobetasoL (TEMOVATE) 0.05 % cream, AAA bid, Disp: 60 g, Rfl: 3  fluticasone (FLONASE) 50 mcg/actuation nasal spray, 2 sprays by Each Nare route once daily. (Patient taking differently: 2 sprays by Each Nostril route daily as needed.), Disp: 16 g, Rfl: 11  metoprolol succinate (TOPROL-XL) 25 MG 24 hr tablet, Take 25 mg by mouth every evening., Disp: , Rfl:   multivitamin capsule, Take 1 capsule by mouth once daily., Disp: , Rfl:   ofloxacin (OCUFLOX) 0.3 % ophthalmic solution, Place 1 drop into the left eye 3 (three) times daily., Disp: , Rfl:     No current facility-administered medications for this visit.      Review of patient's allergies indicates:   -- Cat hair extract     --  Other reaction(s): Sneezing     Review of Systems   Constitutional: Negative.  Negative for diaphoresis, fatigue and fever.   HENT: Negative.     Respiratory: Negative.     Cardiovascular: Negative.    Gastrointestinal: Negative.    Genitourinary: Negative.    Musculoskeletal: Negative.    Integumentary:  Negative.   Neurological: Negative.    Hematological: Negative.        Objective:      Physical Exam  Constitutional:       Appearance: Normal appearance.   HENT:      Head: Normocephalic and atraumatic.   Cardiovascular:      Rate and Rhythm: Normal rate.   Pulmonary:      Effort: Pulmonary effort is normal. No respiratory distress.   Neurological:      Mental Status: He is alert and oriented to person, place, and time.   Psychiatric:         Mood and Affect: Mood normal.         Behavior: Behavior normal.       Assessment:       Problem List Items Addressed This Visit          Unprioritized    HLD (hyperlipidemia) - Primary    Relevant Orders    CBC W/ AUTO DIFFERENTIAL    Comprehensive Metabolic Panel    Lipid Panel     Other Visit Diagnoses       Elevated  hemoglobin        Relevant Orders    CBC W/ AUTO DIFFERENTIAL    Comprehensive Metabolic Panel    TESTOSTERONE PANEL    Iron overload        Relevant Orders    FERRITIN    Iron and TIBC            Plan:     Recheck labs fasting tomorrow, will give further recommendations based on results of testing. Discussed decreasing etoh consumption with patient.   1. Hyperlipidemia, unspecified hyperlipidemia type    - CBC W/ AUTO DIFFERENTIAL; Future  - Comprehensive Metabolic Panel; Future  - Lipid Panel; Future    2. Elevated hemoglobin    - CBC W/ AUTO DIFFERENTIAL; Future  - Comprehensive Metabolic Panel; Future  - TESTOSTERONE PANEL; Future    3. Iron overload    - FERRITIN; Future  - Iron and TIBC; Future

## 2023-01-17 NOTE — PROGRESS NOTES
HPI    Pt sts yesterday OS was irritated all day . Pt thinks it was due to BCL.   Pt sts he started to use the Ocuflox TID. Pt woke up this am and eye feels   better.   Last edited by Anh Breen on 1/17/2023  2:26 PM.        ROS    Positive for: Eyes  Negative for: Constitutional, Gastrointestinal, Neurological, Skin,   Genitourinary, Musculoskeletal, HENT, Endocrine, Cardiovascular,   Respiratory, Psychiatric, Allergic/Imm, Heme/Lymph  Last edited by Jerica Miller, OD on 1/17/2023  2:29 PM.        Assessment /Plan     For exam results, see Encounter Report.    Symblepharon of left eye      Pt doing well s/p symblepharon repair 01/13/2023. Pt had BCL placed by Dr. Hall and felt that lens was irritating his eye yesterday. Pt presented to clinic today feeling much better. Upon examination, no signs of BCL, even on lid eversion. Ed pt that lens likely rubbed out at some point in the night. Eye healing well with mild injection and no staining on the cornea. Ed pt to continue instilling Ocuflox gtts OS as instructed by Dr. Hall. Recommended artificial tears for comfort. Ed pt to call or message if any further issues arise.     RTC: in-office prn.

## 2023-01-18 ENCOUNTER — PATIENT MESSAGE (OUTPATIENT)
Dept: OPTOMETRY | Facility: CLINIC | Age: 58
End: 2023-01-18
Payer: COMMERCIAL

## 2023-01-18 ENCOUNTER — OFFICE VISIT (OUTPATIENT)
Dept: CARDIOLOGY | Facility: CLINIC | Age: 58
End: 2023-01-18
Payer: COMMERCIAL

## 2023-01-18 ENCOUNTER — LAB VISIT (OUTPATIENT)
Dept: LAB | Facility: HOSPITAL | Age: 58
End: 2023-01-18
Attending: NURSE PRACTITIONER
Payer: COMMERCIAL

## 2023-01-18 VITALS
HEART RATE: 61 BPM | DIASTOLIC BLOOD PRESSURE: 82 MMHG | BODY MASS INDEX: 26.43 KG/M2 | WEIGHT: 164.44 LBS | SYSTOLIC BLOOD PRESSURE: 129 MMHG | HEIGHT: 66 IN

## 2023-01-18 DIAGNOSIS — R00.2 PALPITATIONS: Primary | ICD-10-CM

## 2023-01-18 DIAGNOSIS — E83.19 IRON OVERLOAD: ICD-10-CM

## 2023-01-18 DIAGNOSIS — D58.2 ELEVATED HEMOGLOBIN: ICD-10-CM

## 2023-01-18 DIAGNOSIS — E78.5 HYPERLIPIDEMIA, UNSPECIFIED HYPERLIPIDEMIA TYPE: ICD-10-CM

## 2023-01-18 LAB
ALBUMIN SERPL BCP-MCNC: 3.8 G/DL (ref 3.5–5.2)
ALP SERPL-CCNC: 47 U/L (ref 55–135)
ALT SERPL W/O P-5'-P-CCNC: 25 U/L (ref 10–44)
ANION GAP SERPL CALC-SCNC: 8 MMOL/L (ref 8–16)
AST SERPL-CCNC: 31 U/L (ref 10–40)
BASOPHILS # BLD AUTO: 0.07 K/UL (ref 0–0.2)
BASOPHILS NFR BLD: 1.2 % (ref 0–1.9)
BILIRUB SERPL-MCNC: 0.8 MG/DL (ref 0.1–1)
BUN SERPL-MCNC: 14 MG/DL (ref 6–20)
CALCIUM SERPL-MCNC: 9.4 MG/DL (ref 8.7–10.5)
CHLORIDE SERPL-SCNC: 105 MMOL/L (ref 95–110)
CHOLEST SERPL-MCNC: 184 MG/DL (ref 120–199)
CHOLEST/HDLC SERPL: 8.4 {RATIO} (ref 2–5)
CO2 SERPL-SCNC: 26 MMOL/L (ref 23–29)
CREAT SERPL-MCNC: 1.3 MG/DL (ref 0.5–1.4)
DIFFERENTIAL METHOD: NORMAL
EOSINOPHIL # BLD AUTO: 0.2 K/UL (ref 0–0.5)
EOSINOPHIL NFR BLD: 3.5 % (ref 0–8)
ERYTHROCYTE [DISTWIDTH] IN BLOOD BY AUTOMATED COUNT: 13.5 % (ref 11.5–14.5)
EST. GFR  (NO RACE VARIABLE): >60 ML/MIN/1.73 M^2
FERRITIN SERPL-MCNC: 38 NG/ML (ref 20–300)
GLUCOSE SERPL-MCNC: 95 MG/DL (ref 70–110)
HCT VFR BLD AUTO: 52.8 % (ref 40–54)
HDLC SERPL-MCNC: 22 MG/DL (ref 40–75)
HDLC SERPL: 12 % (ref 20–50)
HGB BLD-MCNC: 17.1 G/DL (ref 14–18)
IMM GRANULOCYTES # BLD AUTO: 0.01 K/UL (ref 0–0.04)
IMM GRANULOCYTES NFR BLD AUTO: 0.2 % (ref 0–0.5)
IRON SERPL-MCNC: 194 UG/DL (ref 45–160)
LDLC SERPL CALC-MCNC: 113.6 MG/DL (ref 63–159)
LYMPHOCYTES # BLD AUTO: 1.8 K/UL (ref 1–4.8)
LYMPHOCYTES NFR BLD: 29.5 % (ref 18–48)
MCH RBC QN AUTO: 30.6 PG (ref 27–31)
MCHC RBC AUTO-ENTMCNC: 32.4 G/DL (ref 32–36)
MCV RBC AUTO: 95 FL (ref 82–98)
MONOCYTES # BLD AUTO: 0.7 K/UL (ref 0.3–1)
MONOCYTES NFR BLD: 12 % (ref 4–15)
NEUTROPHILS # BLD AUTO: 3.3 K/UL (ref 1.8–7.7)
NEUTROPHILS NFR BLD: 53.6 % (ref 38–73)
NONHDLC SERPL-MCNC: 162 MG/DL
NRBC BLD-RTO: 0 /100 WBC
PLATELET # BLD AUTO: 204 K/UL (ref 150–450)
PMV BLD AUTO: 10.8 FL (ref 9.2–12.9)
POTASSIUM SERPL-SCNC: 4.5 MMOL/L (ref 3.5–5.1)
PROT SERPL-MCNC: 7.1 G/DL (ref 6–8.4)
RBC # BLD AUTO: 5.58 M/UL (ref 4.6–6.2)
SATURATED IRON: 39 % (ref 20–50)
SODIUM SERPL-SCNC: 139 MMOL/L (ref 136–145)
TOTAL IRON BINDING CAPACITY: 496 UG/DL (ref 250–450)
TRANSFERRIN SERPL-MCNC: 335 MG/DL (ref 200–375)
TRIGL SERPL-MCNC: 242 MG/DL (ref 30–150)
WBC # BLD AUTO: 6.06 K/UL (ref 3.9–12.7)

## 2023-01-18 PROCEDURE — 3079F DIAST BP 80-89 MM HG: CPT | Mod: CPTII,S$GLB,, | Performed by: INTERNAL MEDICINE

## 2023-01-18 PROCEDURE — 99999 PR PBB SHADOW E&M-EST. PATIENT-LVL III: CPT | Mod: PBBFAC,,, | Performed by: INTERNAL MEDICINE

## 2023-01-18 PROCEDURE — 3008F BODY MASS INDEX DOCD: CPT | Mod: CPTII,S$GLB,, | Performed by: INTERNAL MEDICINE

## 2023-01-18 PROCEDURE — 80053 COMPREHEN METABOLIC PANEL: CPT | Performed by: NURSE PRACTITIONER

## 2023-01-18 PROCEDURE — 80061 LIPID PANEL: CPT | Performed by: NURSE PRACTITIONER

## 2023-01-18 PROCEDURE — 1160F RVW MEDS BY RX/DR IN RCRD: CPT | Mod: CPTII,S$GLB,, | Performed by: INTERNAL MEDICINE

## 2023-01-18 PROCEDURE — 99213 OFFICE O/P EST LOW 20 MIN: CPT | Mod: S$GLB,,, | Performed by: INTERNAL MEDICINE

## 2023-01-18 PROCEDURE — 1159F MED LIST DOCD IN RCRD: CPT | Mod: CPTII,S$GLB,, | Performed by: INTERNAL MEDICINE

## 2023-01-18 PROCEDURE — 1159F PR MEDICATION LIST DOCUMENTED IN MEDICAL RECORD: ICD-10-PCS | Mod: CPTII,S$GLB,, | Performed by: INTERNAL MEDICINE

## 2023-01-18 PROCEDURE — 85025 COMPLETE CBC W/AUTO DIFF WBC: CPT | Performed by: NURSE PRACTITIONER

## 2023-01-18 PROCEDURE — 1160F PR REVIEW ALL MEDS BY PRESCRIBER/CLIN PHARMACIST DOCUMENTED: ICD-10-PCS | Mod: CPTII,S$GLB,, | Performed by: INTERNAL MEDICINE

## 2023-01-18 PROCEDURE — 84403 ASSAY OF TOTAL TESTOSTERONE: CPT | Performed by: NURSE PRACTITIONER

## 2023-01-18 PROCEDURE — 3079F PR MOST RECENT DIASTOLIC BLOOD PRESSURE 80-89 MM HG: ICD-10-PCS | Mod: CPTII,S$GLB,, | Performed by: INTERNAL MEDICINE

## 2023-01-18 PROCEDURE — 84466 ASSAY OF TRANSFERRIN: CPT | Performed by: NURSE PRACTITIONER

## 2023-01-18 PROCEDURE — 84270 ASSAY OF SEX HORMONE GLOBUL: CPT | Performed by: NURSE PRACTITIONER

## 2023-01-18 PROCEDURE — 82728 ASSAY OF FERRITIN: CPT | Performed by: NURSE PRACTITIONER

## 2023-01-18 PROCEDURE — 99213 PR OFFICE/OUTPT VISIT, EST, LEVL III, 20-29 MIN: ICD-10-PCS | Mod: S$GLB,,, | Performed by: INTERNAL MEDICINE

## 2023-01-18 PROCEDURE — 3074F SYST BP LT 130 MM HG: CPT | Mod: CPTII,S$GLB,, | Performed by: INTERNAL MEDICINE

## 2023-01-18 PROCEDURE — 36415 COLL VENOUS BLD VENIPUNCTURE: CPT | Mod: PO | Performed by: NURSE PRACTITIONER

## 2023-01-18 PROCEDURE — 3008F PR BODY MASS INDEX (BMI) DOCUMENTED: ICD-10-PCS | Mod: CPTII,S$GLB,, | Performed by: INTERNAL MEDICINE

## 2023-01-18 PROCEDURE — 3074F PR MOST RECENT SYSTOLIC BLOOD PRESSURE < 130 MM HG: ICD-10-PCS | Mod: CPTII,S$GLB,, | Performed by: INTERNAL MEDICINE

## 2023-01-18 PROCEDURE — 99999 PR PBB SHADOW E&M-EST. PATIENT-LVL III: ICD-10-PCS | Mod: PBBFAC,,, | Performed by: INTERNAL MEDICINE

## 2023-01-18 NOTE — PROGRESS NOTES
Subjective:    Patient ID:  Chino Ramirez is a 57 y.o. male who presents for follow-up of Chest Pain (Review cardiac CTA - [facility])      HPI  He comes for follow up with no major problems, no chest pain, no shortness of breath.  He did have a coronary CT last September that was unremarkable.  The calcium score was 0  Biking on a daily basis with no complaints    Review of Systems   Constitutional: Negative for decreased appetite, malaise/fatigue, weight gain and weight loss.   Cardiovascular:  Negative for chest pain, dyspnea on exertion, leg swelling, palpitations and syncope.   Respiratory:  Negative for cough and shortness of breath.    Gastrointestinal: Negative.    Neurological:  Negative for weakness.   All other systems reviewed and are negative.     Objective:      Physical Exam  Vitals and nursing note reviewed.   Constitutional:       Appearance: Normal appearance. He is well-developed.   HENT:      Head: Normocephalic.   Eyes:      Pupils: Pupils are equal, round, and reactive to light.   Neck:      Thyroid: No thyromegaly.      Vascular: No carotid bruit or JVD.   Cardiovascular:      Rate and Rhythm: Normal rate and regular rhythm.      Chest Wall: PMI is not displaced.      Pulses: Normal pulses and intact distal pulses.      Heart sounds: Normal heart sounds. No murmur heard.    No gallop.   Pulmonary:      Effort: Pulmonary effort is normal.      Breath sounds: Normal breath sounds.   Abdominal:      Palpations: Abdomen is soft. There is no mass.      Tenderness: There is no abdominal tenderness.   Musculoskeletal:         General: Normal range of motion.      Cervical back: Normal range of motion and neck supple.   Skin:     General: Skin is warm.   Neurological:      Mental Status: He is alert and oriented to person, place, and time.      Sensory: No sensory deficit.      Deep Tendon Reflexes: Reflexes are normal and symmetric.     Labs drawn today pending      Assessment:       1.  Palpitations    2. Hyperlipidemia, unspecified hyperlipidemia type         Plan:     Reassurance  Regular exercise program  Weight loss  1 year follow-up

## 2023-01-22 ENCOUNTER — PATIENT MESSAGE (OUTPATIENT)
Dept: FAMILY MEDICINE | Facility: CLINIC | Age: 58
End: 2023-01-22
Payer: COMMERCIAL

## 2023-01-23 DIAGNOSIS — H00.016 HORDEOLUM EXTERNUM OF LEFT EYE, UNSPECIFIED EYELID: Primary | ICD-10-CM

## 2023-01-23 LAB
ALBUMIN SERPL-MCNC: 4 G/DL (ref 3.6–5.1)
SHBG SERPL-SCNC: 24 NMOL/L (ref 22–77)
TESTOST FREE SERPL-MCNC: 6.4 PG/ML (ref 46–224)
TESTOST SERPL-MCNC: 42 NG/DL (ref 250–1100)
TESTOSTERONE.FREE+WB SERPL-MCNC: 11.8 NG/DL (ref 110–575)

## 2023-01-23 RX ORDER — NEOMYCIN SULFATE, POLYMYXIN B SULFATE, AND DEXAMETHASONE 3.5; 10000; 1 MG/G; [USP'U]/G; MG/G
OINTMENT OPHTHALMIC
Qty: 3.5 G | Refills: 1 | Status: SHIPPED | OUTPATIENT
Start: 2023-01-23 | End: 2023-01-30

## 2023-01-23 RX ORDER — AZITHROMYCIN 250 MG/1
TABLET, FILM COATED ORAL
Qty: 6 TABLET | Refills: 0 | Status: SHIPPED | OUTPATIENT
Start: 2023-01-23 | End: 2023-01-28

## 2023-01-30 ENCOUNTER — PATIENT MESSAGE (OUTPATIENT)
Dept: OPTOMETRY | Facility: CLINIC | Age: 58
End: 2023-01-30
Payer: COMMERCIAL

## 2023-01-30 DIAGNOSIS — H00.012 HORDEOLUM EXTERNUM OF RIGHT LOWER EYELID: Primary | ICD-10-CM

## 2023-01-30 RX ORDER — AZITHROMYCIN 250 MG/1
TABLET, FILM COATED ORAL
Qty: 6 TABLET | Refills: 0 | Status: SHIPPED | OUTPATIENT
Start: 2023-01-30 | End: 2023-02-04

## 2023-01-31 ENCOUNTER — TELEPHONE (OUTPATIENT)
Dept: OPHTHALMOLOGY | Facility: CLINIC | Age: 58
End: 2023-01-31
Payer: COMMERCIAL

## 2023-01-31 NOTE — TELEPHONE ENCOUNTER
----- Message from Cheryle Quintana sent at 1/31/2023 11:58 AM CST -----  Pls set up appt with esme next wk to remove BCL and call pt and let him know of appt time thanks     Dr Virgen said to stick her on a urgent slot next wk.     Thank you

## 2023-02-03 ENCOUNTER — TELEPHONE (OUTPATIENT)
Dept: OPHTHALMOLOGY | Facility: CLINIC | Age: 58
End: 2023-02-03
Payer: COMMERCIAL

## 2023-02-03 NOTE — TELEPHONE ENCOUNTER
Spoke with patient on today states that he saw another provider because Dr. Virgen was not in, he states that the lens fell out and the left eye is doing fine. 2/3/2023 at 4:53 PM    Reshawn    Left message to schedule eye appt  ----- Message from Amelia Macias MA sent at 1/25/2023 11:07 AM CST -----  I am helping with Dr Hall's messages for today and this wasn't scheduled. Can someone please help get this pt scheduled?     Thanks!  ----- Message -----  From: Larissa Hall MD  Sent: 1/13/2023   5:15 PM CST  To: Baylee Yin MA, Rafael SALEEM Staff     Pls set up appt with diogonellie next wk to remove BCL and call pt and let him know of appt time thanks

## 2023-02-03 NOTE — TELEPHONE ENCOUNTER
Left message to schedule eye appt  ----- Message from Amelia Macias MA sent at 1/25/2023 11:07 AM CST -----  I am helping with Dr Hall's messages for today and this wasn't scheduled. Can someone please help get this pt scheduled?    Thanks!  ----- Message -----  From: Larissa Hall MD  Sent: 1/13/2023   5:15 PM CST  To: Baylee Yin MA, Rafael SALEEM Staff    Pls set up appt with esme next wk to remove BCL and call pt and let him know of appt time thanks

## 2023-02-27 ENCOUNTER — OFFICE VISIT (OUTPATIENT)
Dept: FAMILY MEDICINE | Facility: CLINIC | Age: 58
End: 2023-02-27
Payer: COMMERCIAL

## 2023-02-27 DIAGNOSIS — G47.00 INSOMNIA, UNSPECIFIED TYPE: ICD-10-CM

## 2023-02-27 DIAGNOSIS — R79.89 LOW TESTOSTERONE: Primary | ICD-10-CM

## 2023-02-27 DIAGNOSIS — Z12.5 PROSTATE CANCER SCREENING: ICD-10-CM

## 2023-02-27 DIAGNOSIS — G47.8 POOR SLEEP PATTERN: ICD-10-CM

## 2023-02-27 PROCEDURE — 99213 PR OFFICE/OUTPT VISIT, EST, LEVL III, 20-29 MIN: ICD-10-PCS | Mod: 95,,, | Performed by: FAMILY MEDICINE

## 2023-02-27 PROCEDURE — 99213 OFFICE O/P EST LOW 20 MIN: CPT | Mod: 95,,, | Performed by: FAMILY MEDICINE

## 2023-02-27 NOTE — PROGRESS NOTES
Subjective:       Patient ID: Chino Ramirez is a 57 y.o. male.    Chief Complaint: No chief complaint on file.    HPI    The patient location is: [place]  The chief complaint leading to consultation is: poor sleep    Visit type: audiovisual    Face to Face time with patient:   20 minutes of total time spent on the encounter, which includes face to face time and non-face to face time preparing to see the patient (eg, review of tests), Obtaining and/or reviewing separately obtained history, Documenting clinical information in the electronic or other health record, Independently interpreting results (not separately reported) and communicating results to the patient/family/caregiver, or Care coordination (not separately reported).         Each patient to whom he or she provides medical services by telemedicine is:  (1) informed of the relationship between the physician and patient and the respective role of any other health care provider with respect to management of the patient; and (2) notified that he or she may decline to receive medical services by telemedicine and may withdraw from such care at any time.    Notes:       Recent labs show low testosterone in 1/2023.     Reports trouble sleeping at night.  Vistaril helps.     Reports drinking etoh at night causes him to sleep less.       Review of Systems    Objective:      Physical Exam    Assessment:       1. Low testosterone    2. Insomnia, unspecified type    3. Poor sleep pattern    4. Prostate cancer screening        Plan:       Low testosterone  -     Follicle Stimulating Hormone; Future; Expected date: 02/27/2023  -     Luteinizing Hormone; Future; Expected date: 02/27/2023  -     Testosterone; Future; Expected date: 02/27/2023  -     Prolactin; Future; Expected date: 02/27/2023    Insomnia, unspecified type    Poor sleep pattern  -     Home Sleep Study; Future    Prostate cancer screening  -     PSA, Screening; Future                Plan:  Recheck  testosterone with other hormone levels.   Check psa  Order home sleep study  Cont with vistaril prn        Medication List with Changes/Refills   Current Medications    CLOBETASOL (TEMOVATE) 0.05 % CREAM    AAA bid    HYDROXYZINE PAMOATE (VISTARIL) 25 MG CAP    Take 1 capsule (25 mg total) by mouth nightly as needed (for insomnia).    OFLOXACIN (OCUFLOX) 0.3 % OPHTHALMIC SOLUTION    Place 1 drop into the left eye 3 (three) times daily.

## 2023-04-11 ENCOUNTER — PATIENT MESSAGE (OUTPATIENT)
Dept: ADMINISTRATIVE | Facility: HOSPITAL | Age: 58
End: 2023-04-11
Payer: COMMERCIAL

## 2023-04-24 ENCOUNTER — TELEPHONE (OUTPATIENT)
Dept: FAMILY MEDICINE | Facility: CLINIC | Age: 58
End: 2023-04-24
Payer: COMMERCIAL

## 2023-04-24 RX ORDER — TRAZODONE HYDROCHLORIDE 50 MG/1
50 TABLET ORAL NIGHTLY PRN
Qty: 30 TABLET | Refills: 3 | Status: SHIPPED | OUTPATIENT
Start: 2023-04-24 | End: 2023-04-27

## 2023-04-24 NOTE — TELEPHONE ENCOUNTER
----- Message from Isa Loco sent at 4/24/2023  9:07 AM CDT -----  Contact: self  Type:  Same Day Appointment Request    Caller is requesting a same day appointment.  Caller declined first available appointment listed below.      Name of Caller:  patient  When is the first available appointment?  Wednesday  Symptoms:  real bad anxiety  Best Call Back Number:  [phone]  Additional Information:  Please call back he sounds very anxious

## 2023-04-24 NOTE — TELEPHONE ENCOUNTER
Informed pt I got him in with Connie. Pt wanting to know if his PCP can send in something to help sleep. His anxiety is bad and can not sleep      Please advise

## 2023-04-25 ENCOUNTER — TELEPHONE (OUTPATIENT)
Dept: FAMILY MEDICINE | Facility: CLINIC | Age: 58
End: 2023-04-25
Payer: COMMERCIAL

## 2023-04-25 NOTE — TELEPHONE ENCOUNTER
Patient states he toke up to 3 trazadone and it did seem to help with the anxiety but it did not help him sleep.     He is seeing Connie on 4/27 and he will discuss with her

## 2023-04-25 NOTE — TELEPHONE ENCOUNTER
----- Message from Genoveva Ga sent at 4/25/2023  9:48 AM CDT -----  Who Called: Patient     What is the request in detail: Requesting call back to discuss rx that pt received yesterday did not help is symptoms and pt wants something for anxiety. Pls advise    Can the clinic reply by [facility]? No    Best Call Back Number: [phone]      Additional Information:

## 2023-04-27 ENCOUNTER — OFFICE VISIT (OUTPATIENT)
Dept: FAMILY MEDICINE | Facility: CLINIC | Age: 58
End: 2023-04-27
Payer: COMMERCIAL

## 2023-04-27 VITALS
HEIGHT: 66 IN | BODY MASS INDEX: 26.5 KG/M2 | HEART RATE: 72 BPM | SYSTOLIC BLOOD PRESSURE: 134 MMHG | WEIGHT: 164.88 LBS | OXYGEN SATURATION: 97 % | DIASTOLIC BLOOD PRESSURE: 92 MMHG | TEMPERATURE: 99 F

## 2023-04-27 DIAGNOSIS — F41.9 ANXIETY: Primary | ICD-10-CM

## 2023-04-27 DIAGNOSIS — F43.10 PTSD (POST-TRAUMATIC STRESS DISORDER): ICD-10-CM

## 2023-04-27 PROCEDURE — 3008F BODY MASS INDEX DOCD: CPT | Mod: CPTII,S$GLB,, | Performed by: NURSE PRACTITIONER

## 2023-04-27 PROCEDURE — 3080F PR MOST RECENT DIASTOLIC BLOOD PRESSURE >= 90 MM HG: ICD-10-PCS | Mod: CPTII,S$GLB,, | Performed by: NURSE PRACTITIONER

## 2023-04-27 PROCEDURE — 3080F DIAST BP >= 90 MM HG: CPT | Mod: CPTII,S$GLB,, | Performed by: NURSE PRACTITIONER

## 2023-04-27 PROCEDURE — 99214 OFFICE O/P EST MOD 30 MIN: CPT | Mod: S$GLB,,, | Performed by: NURSE PRACTITIONER

## 2023-04-27 PROCEDURE — 99999 PR PBB SHADOW E&M-EST. PATIENT-LVL IV: CPT | Mod: PBBFAC,,, | Performed by: NURSE PRACTITIONER

## 2023-04-27 PROCEDURE — 3075F PR MOST RECENT SYSTOLIC BLOOD PRESS GE 130-139MM HG: ICD-10-PCS | Mod: CPTII,S$GLB,, | Performed by: NURSE PRACTITIONER

## 2023-04-27 PROCEDURE — 99214 PR OFFICE/OUTPT VISIT, EST, LEVL IV, 30-39 MIN: ICD-10-PCS | Mod: S$GLB,,, | Performed by: NURSE PRACTITIONER

## 2023-04-27 PROCEDURE — 99999 PR PBB SHADOW E&M-EST. PATIENT-LVL IV: ICD-10-PCS | Mod: PBBFAC,,, | Performed by: NURSE PRACTITIONER

## 2023-04-27 PROCEDURE — 3075F SYST BP GE 130 - 139MM HG: CPT | Mod: CPTII,S$GLB,, | Performed by: NURSE PRACTITIONER

## 2023-04-27 PROCEDURE — 3008F PR BODY MASS INDEX (BMI) DOCUMENTED: ICD-10-PCS | Mod: CPTII,S$GLB,, | Performed by: NURSE PRACTITIONER

## 2023-04-27 RX ORDER — ALPRAZOLAM 0.25 MG/1
0.25 TABLET ORAL NIGHTLY PRN
Qty: 30 TABLET | Refills: 0 | Status: SHIPPED | OUTPATIENT
Start: 2023-04-27 | End: 2023-05-18 | Stop reason: SDUPTHER

## 2023-04-27 RX ORDER — ESCITALOPRAM OXALATE 10 MG/1
10 TABLET ORAL DAILY
Qty: 30 TABLET | Refills: 5 | Status: SHIPPED | OUTPATIENT
Start: 2023-04-27 | End: 2023-06-20

## 2023-04-27 NOTE — TELEPHONE ENCOUNTER
Dr Jansen,    I saw Mr. Reich today. He is struggling with anxiety and PTSD--chronic issues recently exacerbated.    Started Lexapro--he is following up with me in 2 weeks. Looking for counselor.    He has taken xanax in the past at night for anxiety/insomnia which has worked well--no side effects. Has tried trazodone and hydroxyzine without adequate response.     He is requesting short term xanax refill. Please consider

## 2023-04-27 NOTE — PROGRESS NOTES
Subjective:       Patient ID: Chino Ramirez is a 57 y.o. male.    Chief Complaint: Insomnia and Anxiety (Been going on for years but getting worse.  Been taking benadryl to sleep)    HPI  Patient presents for uncontrolled anxiety, PTSD  Lifelong struggle with PTSD, recently exacerbated  Not sleeping well  Denies SI  No previous daily tx. Has taken xanax previously to help anxiety/sleep which has worked well--no adverse effect  Interested in med and counseling     BP has been up the last few weeks--previously normal, no hx HTN. Associates with anxiety     Vitals:    04/27/23 1045   BP: (!) 134/92   Pulse: 72   Temp: 98.9 °F (37.2 °C)     Review of Systems   Psychiatric/Behavioral:  Positive for sleep disturbance. Negative for suicidal ideas. The patient is nervous/anxious.      Past Medical History:   Diagnosis Date    Allergic rhinitis due to other allergen 08/11/2010    ED (erectile dysfunction)     HLD (hyperlipidemia) 01/02/2012    Hyperlipidemia     Hypogonadism male 01/02/2012    Osteoarthrosis, unspecified whether generalized or localized, lower leg 09/18/2014    PONV (postoperative nausea and vomiting)     Tendonitis 2014    right elbow     Objective:      Physical Exam  Constitutional:       General: He is not in acute distress.     Appearance: He is well-developed. He is not ill-appearing, toxic-appearing or diaphoretic.   HENT:      Right Ear: Hearing normal.      Left Ear: Hearing normal.   Pulmonary:      Effort: No tachypnea or respiratory distress.   Skin:     Coloration: Skin is not pale.   Neurological:      Mental Status: He is alert and oriented to person, place, and time.   Psychiatric:         Mood and Affect: Mood is anxious and depressed.         Speech: Speech normal.         Behavior: Behavior normal.         Thought Content: Thought content normal.         Judgment: Judgment normal.       Assessment:       1. Anxiety    2. PTSD (post-traumatic stress disorder)        Plan:        Anxiety  -     Ambulatory referral/consult to Psychology; Future; Expected date: 05/04/2023  -     EScitalopram oxalate (LEXAPRO) 10 MG tablet; Take 1 tablet (10 mg total) by mouth once daily.  Dispense: 30 tablet; Refill: 5    PTSD (post-traumatic stress disorder)  -     Ambulatory referral/consult to Psychology; Future; Expected date: 05/04/2023          Start Lexapro. Will discuss xanax with PCP   Patient will look for outside counseling   FU 2-3 weeks, sooner as needed      Medication List with Changes/Refills   New Medications    ESCITALOPRAM OXALATE (LEXAPRO) 10 MG TABLET    Take 1 tablet (10 mg total) by mouth once daily.   Current Medications    OFLOXACIN (OCUFLOX) 0.3 % OPHTHALMIC SOLUTION    Place 1 drop into the left eye 3 (three) times daily.   Discontinued Medications    CLOBETASOL (TEMOVATE) 0.05 % CREAM    AAA bid    HYDROXYZINE PAMOATE (VISTARIL) 25 MG CAP    Take 1 capsule (25 mg total) by mouth nightly as needed (for insomnia).    TRAZODONE (DESYREL) 50 MG TABLET    Take 1 tablet (50 mg total) by mouth nightly as needed for Insomnia.

## 2023-04-28 ENCOUNTER — LAB VISIT (OUTPATIENT)
Dept: LAB | Facility: HOSPITAL | Age: 58
End: 2023-04-28
Attending: FAMILY MEDICINE
Payer: COMMERCIAL

## 2023-04-28 DIAGNOSIS — Z12.5 PROSTATE CANCER SCREENING: ICD-10-CM

## 2023-04-28 DIAGNOSIS — R79.89 LOW TESTOSTERONE: ICD-10-CM

## 2023-04-28 LAB
COMPLEXED PSA SERPL-MCNC: 1.1 NG/ML (ref 0–4)
TESTOST SERPL-MCNC: 238 NG/DL (ref 304–1227)

## 2023-04-28 PROCEDURE — 84153 ASSAY OF PSA TOTAL: CPT | Performed by: FAMILY MEDICINE

## 2023-04-28 PROCEDURE — 83002 ASSAY OF GONADOTROPIN (LH): CPT | Performed by: FAMILY MEDICINE

## 2023-04-28 PROCEDURE — 84403 ASSAY OF TOTAL TESTOSTERONE: CPT | Performed by: FAMILY MEDICINE

## 2023-04-28 PROCEDURE — 36415 COLL VENOUS BLD VENIPUNCTURE: CPT | Mod: PO | Performed by: FAMILY MEDICINE

## 2023-04-28 PROCEDURE — 84146 ASSAY OF PROLACTIN: CPT | Performed by: FAMILY MEDICINE

## 2023-04-28 PROCEDURE — 83001 ASSAY OF GONADOTROPIN (FSH): CPT | Performed by: FAMILY MEDICINE

## 2023-04-29 LAB
FSH SERPL-ACNC: 0.25 MIU/ML (ref 0.95–11.95)
LH SERPL-ACNC: 0.3 MIU/ML (ref 0.6–12.1)
PROLACTIN SERPL IA-MCNC: 10.1 NG/ML (ref 3.5–19.4)

## 2023-05-01 ENCOUNTER — PATIENT MESSAGE (OUTPATIENT)
Dept: PSYCHIATRY | Facility: CLINIC | Age: 58
End: 2023-05-01
Payer: COMMERCIAL

## 2023-05-01 ENCOUNTER — TELEPHONE (OUTPATIENT)
Dept: PSYCHIATRY | Facility: CLINIC | Age: 58
End: 2023-05-01
Payer: COMMERCIAL

## 2023-05-11 ENCOUNTER — TELEPHONE (OUTPATIENT)
Dept: FAMILY MEDICINE | Facility: CLINIC | Age: 58
End: 2023-05-11
Payer: COMMERCIAL

## 2023-05-11 NOTE — TELEPHONE ENCOUNTER
----- Message from Víctor Jansen MD sent at 5/7/2023 11:04 PM CDT -----  inform pt via phone that I reviewed the test results and note the following:    Home sleep study was negative for significant sleep apnea.

## 2023-05-18 NOTE — TELEPHONE ENCOUNTER
No care due was identified.  [facility] Embedded Care Due Messages. Reference number: [id number].   5/18/2023 2:29:49 PM CDT

## 2023-05-18 NOTE — TELEPHONE ENCOUNTER
----- Message from Latanya Marshall sent at 5/18/2023  2:12 PM CDT -----  Contact: patient  Type:  RX Refill Request    Who Called:  patient  Refill or New Rx:  refill  RX Name and Strength:  ALPRAZolam (XANAX) 0.25 MG tablet  How is the patient currently taking it? (ex. 1XDay):  as directed  Is this a 30 day or 90 day RX:  30  Preferred Pharmacy with phone number:    [facility] #[id number] - [place] [address] AT [facility] [address] [address]  Phone: [phone] Fax: [phone]  Local or Mail Order:  local  Ordering Provider:  gi Cotton Call Back Number:  [phone] (home) [phone] (work)  Additional Information:

## 2023-05-19 ENCOUNTER — PATIENT MESSAGE (OUTPATIENT)
Dept: ADMINISTRATIVE | Facility: HOSPITAL | Age: 58
End: 2023-05-19
Payer: COMMERCIAL

## 2023-05-19 RX ORDER — ALPRAZOLAM 0.25 MG/1
0.25 TABLET ORAL NIGHTLY PRN
Qty: 30 TABLET | Refills: 0 | Status: SHIPPED | OUTPATIENT
Start: 2023-05-19 | End: 2023-06-20

## 2023-05-21 ENCOUNTER — TELEPHONE (OUTPATIENT)
Dept: FAMILY MEDICINE | Facility: CLINIC | Age: 58
End: 2023-05-21
Payer: COMMERCIAL

## 2023-05-21 DIAGNOSIS — R79.89 LOW TESTOSTERONE: Primary | ICD-10-CM

## 2023-05-22 NOTE — TELEPHONE ENCOUNTER
inform pt via phone:    Low testosterone and low male hormones noted.    Needs mri of brain to r/o pituitary mass.    Please schedule mri brain

## 2023-06-02 ENCOUNTER — PATIENT MESSAGE (OUTPATIENT)
Dept: FAMILY MEDICINE | Facility: CLINIC | Age: 58
End: 2023-06-02
Payer: COMMERCIAL

## 2023-06-06 ENCOUNTER — HOSPITAL ENCOUNTER (OUTPATIENT)
Dept: RADIOLOGY | Facility: HOSPITAL | Age: 58
Discharge: HOME | End: 2023-06-06
Attending: FAMILY MEDICINE
Payer: COMMERCIAL

## 2023-06-06 DIAGNOSIS — R79.89 LOW TESTOSTERONE: ICD-10-CM

## 2023-06-06 PROCEDURE — A9585 GADOBUTROL INJECTION: HCPCS | Mod: PO | Performed by: FAMILY MEDICINE

## 2023-06-06 PROCEDURE — 70553 MRI BRAIN W WO CONTRAST: ICD-10-PCS | Mod: 26,,, | Performed by: RADIOLOGY

## 2023-06-06 PROCEDURE — 70553 MRI BRAIN STEM W/O & W/DYE: CPT | Mod: 26,,, | Performed by: RADIOLOGY

## 2023-06-06 PROCEDURE — 70553 MRI BRAIN STEM W/O & W/DYE: CPT | Mod: TC,PO

## 2023-06-06 PROCEDURE — 25500020 PHARM REV CODE 255: Mod: PO | Performed by: FAMILY MEDICINE

## 2023-06-06 RX ORDER — GADOBUTROL 604.72 MG/ML
7 INJECTION INTRAVENOUS
Status: COMPLETED | OUTPATIENT
Start: 2023-06-06 | End: 2023-06-06

## 2023-06-06 RX ADMIN — GADOBUTROL 7 ML: 604.72 INJECTION INTRAVENOUS at 12:06

## 2023-06-19 ENCOUNTER — OFFICE VISIT (OUTPATIENT)
Dept: OPTOMETRY | Facility: CLINIC | Age: 58
End: 2023-06-19
Payer: COMMERCIAL

## 2023-06-19 DIAGNOSIS — H10.829 ROSACEA BLEPHAROCONJUNCTIVITIS: Primary | ICD-10-CM

## 2023-06-19 DIAGNOSIS — H00.012 HORDEOLUM EXTERNUM OF RIGHT LOWER EYELID: ICD-10-CM

## 2023-06-19 PROCEDURE — 99213 PR OFFICE/OUTPT VISIT, EST, LEVL III, 20-29 MIN: ICD-10-PCS | Mod: S$GLB,,, | Performed by: OPTOMETRIST

## 2023-06-19 PROCEDURE — 99213 OFFICE O/P EST LOW 20 MIN: CPT | Mod: S$GLB,,, | Performed by: OPTOMETRIST

## 2023-06-19 PROCEDURE — 1159F PR MEDICATION LIST DOCUMENTED IN MEDICAL RECORD: ICD-10-PCS | Mod: CPTII,S$GLB,, | Performed by: OPTOMETRIST

## 2023-06-19 PROCEDURE — 99999 PR PBB SHADOW E&M-EST. PATIENT-LVL II: CPT | Mod: PBBFAC,,, | Performed by: OPTOMETRIST

## 2023-06-19 PROCEDURE — 99999 PR PBB SHADOW E&M-EST. PATIENT-LVL II: ICD-10-PCS | Mod: PBBFAC,,, | Performed by: OPTOMETRIST

## 2023-06-19 PROCEDURE — 1159F MED LIST DOCD IN RCRD: CPT | Mod: CPTII,S$GLB,, | Performed by: OPTOMETRIST

## 2023-06-19 RX ORDER — OFLOXACIN 3 MG/ML
1 SOLUTION/ DROPS OPHTHALMIC 3 TIMES DAILY
Qty: 5 ML | Refills: 0 | Status: SHIPPED | OUTPATIENT
Start: 2023-06-19 | End: 2023-09-26

## 2023-06-19 RX ORDER — DOXYCYCLINE 50 MG/1
50 CAPSULE ORAL DAILY
Qty: 30 CAPSULE | Refills: 0 | Status: SHIPPED | OUTPATIENT
Start: 2023-06-19 | End: 2023-07-19

## 2023-06-19 RX ORDER — NEOMYCIN SULFATE, POLYMYXIN B SULFATE, AND DEXAMETHASONE 3.5; 10000; 1 MG/G; [USP'U]/G; MG/G
OINTMENT OPHTHALMIC
Qty: 3.5 G | Refills: 1 | Status: SHIPPED | OUTPATIENT
Start: 2023-06-19 | End: 2023-09-26

## 2023-06-20 ENCOUNTER — OFFICE VISIT (OUTPATIENT)
Dept: FAMILY MEDICINE | Facility: CLINIC | Age: 58
End: 2023-06-20
Payer: COMMERCIAL

## 2023-06-20 VITALS
WEIGHT: 161.81 LBS | HEIGHT: 66 IN | RESPIRATION RATE: 18 BRPM | TEMPERATURE: 98 F | SYSTOLIC BLOOD PRESSURE: 130 MMHG | DIASTOLIC BLOOD PRESSURE: 78 MMHG | OXYGEN SATURATION: 96 % | HEART RATE: 60 BPM | BODY MASS INDEX: 26.01 KG/M2

## 2023-06-20 DIAGNOSIS — E34.9 TESTOSTERONE DEFICIENCY: Primary | ICD-10-CM

## 2023-06-20 PROCEDURE — 3078F DIAST BP <80 MM HG: CPT | Mod: CPTII,S$GLB,, | Performed by: FAMILY MEDICINE

## 2023-06-20 PROCEDURE — 3008F PR BODY MASS INDEX (BMI) DOCUMENTED: ICD-10-PCS | Mod: CPTII,S$GLB,, | Performed by: FAMILY MEDICINE

## 2023-06-20 PROCEDURE — 1159F MED LIST DOCD IN RCRD: CPT | Mod: CPTII,S$GLB,, | Performed by: FAMILY MEDICINE

## 2023-06-20 PROCEDURE — 1159F PR MEDICATION LIST DOCUMENTED IN MEDICAL RECORD: ICD-10-PCS | Mod: CPTII,S$GLB,, | Performed by: FAMILY MEDICINE

## 2023-06-20 PROCEDURE — 99214 PR OFFICE/OUTPT VISIT, EST, LEVL IV, 30-39 MIN: ICD-10-PCS | Mod: S$GLB,,, | Performed by: FAMILY MEDICINE

## 2023-06-20 PROCEDURE — 99999 PR PBB SHADOW E&M-EST. PATIENT-LVL IV: ICD-10-PCS | Mod: PBBFAC,,, | Performed by: FAMILY MEDICINE

## 2023-06-20 PROCEDURE — 3075F SYST BP GE 130 - 139MM HG: CPT | Mod: CPTII,S$GLB,, | Performed by: FAMILY MEDICINE

## 2023-06-20 PROCEDURE — 99999 PR PBB SHADOW E&M-EST. PATIENT-LVL IV: CPT | Mod: PBBFAC,,, | Performed by: FAMILY MEDICINE

## 2023-06-20 PROCEDURE — 3075F PR MOST RECENT SYSTOLIC BLOOD PRESS GE 130-139MM HG: ICD-10-PCS | Mod: CPTII,S$GLB,, | Performed by: FAMILY MEDICINE

## 2023-06-20 PROCEDURE — 3078F PR MOST RECENT DIASTOLIC BLOOD PRESSURE < 80 MM HG: ICD-10-PCS | Mod: CPTII,S$GLB,, | Performed by: FAMILY MEDICINE

## 2023-06-20 PROCEDURE — 3008F BODY MASS INDEX DOCD: CPT | Mod: CPTII,S$GLB,, | Performed by: FAMILY MEDICINE

## 2023-06-20 PROCEDURE — 99214 OFFICE O/P EST MOD 30 MIN: CPT | Mod: S$GLB,,, | Performed by: FAMILY MEDICINE

## 2023-06-20 RX ORDER — ALPRAZOLAM 0.5 MG/1
0.5 TABLET ORAL DAILY PRN
Qty: 30 TABLET | Refills: 2 | Status: SHIPPED | OUTPATIENT
Start: 2023-06-20 | End: 2023-09-26 | Stop reason: SDUPTHER

## 2023-06-20 RX ORDER — TESTOSTERONE CYPIONATE 200 MG/ML
200 INJECTION, SOLUTION INTRAMUSCULAR
Qty: 2 ML | Refills: 5 | Status: SHIPPED | OUTPATIENT
Start: 2023-06-20

## 2023-06-20 RX ORDER — TESTOSTERONE CYPIONATE 200 MG/ML
200 INJECTION, SOLUTION INTRAMUSCULAR
Qty: 2 ML | Refills: 5 | Status: SHIPPED | OUTPATIENT
Start: 2023-06-20 | End: 2023-06-20 | Stop reason: SDUPTHER

## 2023-06-20 NOTE — PROGRESS NOTES
Subjective:       Patient ID: Chino Ramirez is a 57 y.o. male.    Chief Complaint: Follow-up (Trouble sleeping)    HPI    Here for a f/u    Recent labs note low testosterone. Also, recent mri brain was normal.    Anxiety stable. Takes xanax sparingly.       Objective:      Physical Exam  HENT:      Head: Atraumatic.   Eyes:      Conjunctiva/sclera: Conjunctivae normal.      Pupils: Pupils are equal, round, and reactive to light.   Cardiovascular:      Rate and Rhythm: Normal rate and regular rhythm.      Heart sounds: No murmur heard.  Pulmonary:      Effort: Pulmonary effort is normal.      Breath sounds: Normal breath sounds. No wheezing.   Musculoskeletal:      Cervical back: Normal range of motion.   Lymphadenopathy:      Cervical: No cervical adenopathy.       Assessment:       1. Testosterone deficiency        Plan:       Testosterone deficiency  -     TESTOSTERONE; Future; Expected date: 06/20/2023  -     CBC Auto Differential; Future    Other orders  -     ALPRAZolam (XANAX) 0.5 MG tablet; Take 1 tablet (0.5 mg total) by mouth daily as needed for Anxiety.  Dispense: 30 tablet; Refill: 2  -     Discontinue: testosterone cypionate (DEPOTESTOTERONE CYPIONATE) 200 mg/mL injection; Inject 1 mL (200 mg total) into the muscle every 14 (fourteen) days.  Dispense: 2 mL; Refill: 5  -     testosterone cypionate (DEPOTESTOTERONE CYPIONATE) 200 mg/mL injection; Inject 1 mL (200 mg total) into the muscle every 14 (fourteen) days.  Dispense: 2 mL; Refill: 5      Plan:  Start testosterone injections.   Rf xanax  Testosterone and cbc in 10 weeks.   F/u with Dr. Agustin in 12 weeks      Medication List with Changes/Refills   New Medications    ALPRAZOLAM (XANAX) 0.5 MG TABLET    Take 1 tablet (0.5 mg total) by mouth daily as needed for Anxiety.    TESTOSTERONE CYPIONATE (DEPOTESTOTERONE CYPIONATE) 200 MG/ML INJECTION    Inject 1 mL (200 mg total) into the muscle every 14 (fourteen) days.   Current Medications     DOXYCYCLINE (MONODOX) 50 MG CAP    Take 1 capsule (50 mg total) by mouth once daily.    NEOMYCIN-POLYMYXIN-DEXAMETHASONE (MAXITROL) 3.5 MG/G-10,000 UNIT/G-0.1 % OINT    Apply sparingly to eyelid margin OU qhs prn to control blepharitis symptoms    OFLOXACIN (OCUFLOX) 0.3 % OPHTHALMIC SOLUTION    Place 1 drop into the left eye 3 (three) times daily.   Discontinued Medications    ALPRAZOLAM (XANAX) 0.25 MG TABLET    Take 1 tablet (0.25 mg total) by mouth nightly as needed for Anxiety or Insomnia.    ESCITALOPRAM OXALATE (LEXAPRO) 10 MG TABLET    Take 1 tablet (10 mg total) by mouth once daily.

## 2023-07-11 RX ORDER — ALPRAZOLAM 0.5 MG/1
0.5 TABLET ORAL DAILY PRN
Qty: 30 TABLET | Refills: 2 | OUTPATIENT
Start: 2023-07-11 | End: 2023-08-10

## 2023-07-13 RX ORDER — ALPRAZOLAM 0.5 MG/1
0.5 TABLET ORAL DAILY PRN
Qty: 30 TABLET | Refills: 2 | OUTPATIENT
Start: 2023-07-13 | End: 2023-08-12

## 2023-07-13 NOTE — TELEPHONE ENCOUNTER
Please advise, out of medication. Id est care with you.Is this something you can help with? Or try and get him in sooner?

## 2023-07-13 NOTE — TELEPHONE ENCOUNTER
----- Message from Marilyn Irvin sent at 7/13/2023  8:55 AM CDT -----  Contact: pt  Type:  Needs Medical Advice    Who Called: pt  Symptoms (please be specific): insomnia and anxiety   How long has patient had these symptoms:  years and getting worse  Pharmacy name and phone #:    [facility] #[id number] - [facility] [address] [place] [address] AT [facility] [address]  [address] [address] [address]  Phone: [phone] Fax: [phone]  Would the patient rather a call back or a response via [facility]? Call back  Best Call Back Number: [phone]  Additional Information: Pt is out of meds (ALPRAZolam (XANAX) 0.5 MG tablet) and needs a new script for it. Is having a run of insomnia, needs to work but can't function.  Please call to advise  thanks

## 2023-07-21 NOTE — PROGRESS NOTES
HPI     Eye Problem     Additional comments: Irritation/pain OD           Comments    DLS: 1/17/23    Pt states has been having some pain and irritation right lower lid x 4   months. Today pain level at 2-4. No drops. Has done 2 courses of   antibiotics zeke and lid.          Last edited by MUKUND Virgen, OD on 6/19/2023 10:55 AM.            Assessment /Plan     For exam results, see Encounter Report.    Rosacea blepharoconjunctivitis  -     doxycycline (MONODOX) 50 MG Cap; Take 1 capsule (50 mg total) by mouth once daily.  Dispense: 30 capsule; Refill: 0  -     neomycin-polymyxin-dexamethasone (MAXITROL) 3.5 mg/g-10,000 unit/g-0.1 % Oint; Apply sparingly to eyelid margin OU qhs prn to control blepharitis symptoms  Dispense: 3.5 g; Refill: 1    Hordeolum externum of right lower eyelid    Other orders  -     ofloxacin (OCUFLOX) 0.3 % ophthalmic solution; Place 1 drop into the left eye 3 (three) times daily.  Dispense: 5 mL; Refill: 0      1,2.   Continue lid hygiene w/ warm compress / gentle cleansing   Maxitrol zeke to lid margin qhs, short course then d/c for effect   Oral doxy low dose for 30 days ---pt will discuss w/ derm and consider long term use   Refilled ocuflox for prn use     Advised non medicated ophth zeke to apply to lid skin OD    No current trichiasis / spk OS     Message with report then will f/u pending                   3E/Telemetry cath lab/Telemetry

## 2023-08-09 ENCOUNTER — PATIENT MESSAGE (OUTPATIENT)
Dept: ADMINISTRATIVE | Facility: HOSPITAL | Age: 58
End: 2023-08-09
Payer: COMMERCIAL

## 2023-08-15 ENCOUNTER — PATIENT OUTREACH (OUTPATIENT)
Dept: ADMINISTRATIVE | Facility: HOSPITAL | Age: 58
End: 2023-08-15
Payer: COMMERCIAL

## 2023-08-15 NOTE — PROGRESS NOTES
Population Health Chart Review & Patient Outreach Details:     Reason for Outreach Encounter:     []  Non-Compliant Report   []  Payor Report ([employer], PHN, BCBS, MSSP, MCIP, UHC, etc.)   []  Pre-Visit Chart Review     Updates Requested / Reviewed:     [x]  Care Everywhere    [x]     []  External Sources ([facility], [facility], DIS, etc.)   [x]  Care Team Updated    Patient Outreach Method:    []  Telephone Outreach Completed   [] Successful   [] Left Voicemail   [] Unable to Contact (wrong number, no voicemail)  []  [employer] Portal Outreach Sent  []  Letter Outreach Mailed  []  Fax Sent for External Records  []  External Records Upload    Health Maintenance Topics Addressed and Outreach Outcomes / Actions Taken:        []      Breast Cancer Screening []  Mammo Scheduled      []  External Records Requested     []  Added Reminder to Complete to Upcoming Primary Care Appt Notes     []  Patient Declined     []  Patient Will Call Back to Schedule     []  Patient Will Schedule with External Provider / Order Routed if Applicable             []       Cervical Cancer Screening []  Pap Scheduled      []  External Records Requested     []  Added Reminder to Complete to Upcoming Primary Care Appt Notes     []  Patient Declined     []  Patient Will Call Back to Schedule     []  Patient Will Schedule with External Provider               []          Colorectal Cancer Screening []  Colonoscopy Case Request or Referral Placed     []  External Records Requested     []  Added Reminder to Complete to Upcoming Primary Care Appt Notes     []  Patient Declined     []  Patient Will Call Back to Schedule     []  Patient Will Schedule with External Provider     []  Fit Kit Mailed (add the SmartPhrase under additional notes)     []  Reminded Patient to Complete Home Test             []      Diabetic Eye Exam []  Eye Camera Scheduled or Optometry Referral Placed     []  External Records Requested     []  Added Reminder to Complete to  Upcoming Primary Care Appt Notes     []  Patient Declined     []  Patient Will Call Back to Schedule     []  Patient Will Schedule with External Provider             []      Blood Pressure Control []  Primary Care Follow Up Visit Scheduled     []  Remote Blood Pressure Reading Captured     []  Added Reminder to Complete to Upcoming Primary Care Appt Notes     []  Patient Declined     []  Patient Will Call Back / Patient Will Send Portal Message with Reading     []  Patient Will Call Back to Schedule Provider Visit             []       HbA1c & Other Labs []  Lab Appt Scheduled for Due Labs     []  Primary Care Follow Up Visit Scheduled      []  Reminded Patient to Complete Home Test     []  Added Reminder to Complete to Upcoming Primary Care Appt Notes     []  Patient Declined     []  Patient Will Call Back to Schedule     []  Patient Will Schedule with External Provider / Order Routed if Applicable           [x]    Schedule Primary Care Appt [x]  Primary Care Appt Scheduled     []  Patient Declined     []  Patient Will Call Back to Schedule     []  Pt Established with External Provider & Updated Care Team             []      Medication Adherence []  Primary Care Appointment Scheduled     []  Added Reminder to Upcoming Primary Care Appt Notes     []  Patient Reminded to  Prescription     []  Patient Declined, Provider Notified if Needed     []  Sent Provider Message to Review and/or Add Exclusion to Problem List             []      Osteoporosis Screening []  DXA Appointment Scheduled     []  External Records Requested     []  Added Reminder to Complete to Upcoming Primary Care Appt Notes     []  Patient Declined     []  Patient Will Call Back to Schedule     []  Patient Will Schedule with External Provider / Order Routed if Applicable     Additional Care Coordinator Notes:         Further Action Needed If Patient Returns Outreach:

## 2023-09-26 ENCOUNTER — OFFICE VISIT (OUTPATIENT)
Dept: FAMILY MEDICINE | Facility: CLINIC | Age: 58
End: 2023-09-26
Payer: COMMERCIAL

## 2023-09-26 ENCOUNTER — LAB VISIT (OUTPATIENT)
Dept: LAB | Facility: HOSPITAL | Age: 58
End: 2023-09-26
Attending: FAMILY MEDICINE
Payer: COMMERCIAL

## 2023-09-26 VITALS
DIASTOLIC BLOOD PRESSURE: 86 MMHG | SYSTOLIC BLOOD PRESSURE: 130 MMHG | OXYGEN SATURATION: 98 % | HEART RATE: 67 BPM | WEIGHT: 165.56 LBS | BODY MASS INDEX: 26.61 KG/M2 | HEIGHT: 66 IN | TEMPERATURE: 99 F

## 2023-09-26 DIAGNOSIS — F41.1 GAD (GENERALIZED ANXIETY DISORDER): ICD-10-CM

## 2023-09-26 DIAGNOSIS — E29.1 HYPOGONADISM MALE: ICD-10-CM

## 2023-09-26 DIAGNOSIS — E29.1 HYPOGONADISM MALE: Primary | ICD-10-CM

## 2023-09-26 DIAGNOSIS — E78.5 HYPERLIPIDEMIA, UNSPECIFIED HYPERLIPIDEMIA TYPE: ICD-10-CM

## 2023-09-26 PROBLEM — R07.9 CHEST PAIN: Status: RESOLVED | Noted: 2020-06-15 | Resolved: 2023-09-26

## 2023-09-26 PROBLEM — S83.241A ACUTE MENISCAL TEAR, MEDIAL, RIGHT, INITIAL ENCOUNTER: Status: RESOLVED | Noted: 2019-07-08 | Resolved: 2023-09-26

## 2023-09-26 PROCEDURE — 84403 ASSAY OF TOTAL TESTOSTERONE: CPT | Performed by: FAMILY MEDICINE

## 2023-09-26 PROCEDURE — 3008F PR BODY MASS INDEX (BMI) DOCUMENTED: ICD-10-PCS | Mod: CPTII,S$GLB,, | Performed by: FAMILY MEDICINE

## 2023-09-26 PROCEDURE — 3075F SYST BP GE 130 - 139MM HG: CPT | Mod: CPTII,S$GLB,, | Performed by: FAMILY MEDICINE

## 2023-09-26 PROCEDURE — 1160F RVW MEDS BY RX/DR IN RCRD: CPT | Mod: CPTII,S$GLB,, | Performed by: FAMILY MEDICINE

## 2023-09-26 PROCEDURE — 99999 PR PBB SHADOW E&M-EST. PATIENT-LVL III: CPT | Mod: PBBFAC,,, | Performed by: FAMILY MEDICINE

## 2023-09-26 PROCEDURE — 3079F DIAST BP 80-89 MM HG: CPT | Mod: CPTII,S$GLB,, | Performed by: FAMILY MEDICINE

## 2023-09-26 PROCEDURE — 3079F PR MOST RECENT DIASTOLIC BLOOD PRESSURE 80-89 MM HG: ICD-10-PCS | Mod: CPTII,S$GLB,, | Performed by: FAMILY MEDICINE

## 2023-09-26 PROCEDURE — 85025 COMPLETE CBC W/AUTO DIFF WBC: CPT | Performed by: FAMILY MEDICINE

## 2023-09-26 PROCEDURE — 1159F MED LIST DOCD IN RCRD: CPT | Mod: CPTII,S$GLB,, | Performed by: FAMILY MEDICINE

## 2023-09-26 PROCEDURE — 36415 COLL VENOUS BLD VENIPUNCTURE: CPT | Mod: PO | Performed by: FAMILY MEDICINE

## 2023-09-26 PROCEDURE — 99999 PR PBB SHADOW E&M-EST. PATIENT-LVL III: ICD-10-PCS | Mod: PBBFAC,,, | Performed by: FAMILY MEDICINE

## 2023-09-26 PROCEDURE — 1159F PR MEDICATION LIST DOCUMENTED IN MEDICAL RECORD: ICD-10-PCS | Mod: CPTII,S$GLB,, | Performed by: FAMILY MEDICINE

## 2023-09-26 PROCEDURE — 3008F BODY MASS INDEX DOCD: CPT | Mod: CPTII,S$GLB,, | Performed by: FAMILY MEDICINE

## 2023-09-26 PROCEDURE — 99214 OFFICE O/P EST MOD 30 MIN: CPT | Mod: S$GLB,,, | Performed by: FAMILY MEDICINE

## 2023-09-26 PROCEDURE — 1160F PR REVIEW ALL MEDS BY PRESCRIBER/CLIN PHARMACIST DOCUMENTED: ICD-10-PCS | Mod: CPTII,S$GLB,, | Performed by: FAMILY MEDICINE

## 2023-09-26 PROCEDURE — 3075F PR MOST RECENT SYSTOLIC BLOOD PRESS GE 130-139MM HG: ICD-10-PCS | Mod: CPTII,S$GLB,, | Performed by: FAMILY MEDICINE

## 2023-09-26 PROCEDURE — 99214 PR OFFICE/OUTPT VISIT, EST, LEVL IV, 30-39 MIN: ICD-10-PCS | Mod: S$GLB,,, | Performed by: FAMILY MEDICINE

## 2023-09-26 RX ORDER — ALPRAZOLAM 0.5 MG/1
0.5 TABLET ORAL DAILY PRN
Qty: 30 TABLET | Refills: 0 | Status: SHIPPED | OUTPATIENT
Start: 2023-09-26 | End: 2023-11-30

## 2023-09-26 RX ORDER — DULOXETIN HYDROCHLORIDE 30 MG/1
30 CAPSULE, DELAYED RELEASE ORAL DAILY
Qty: 30 CAPSULE | Refills: 3 | Status: SHIPPED | OUTPATIENT
Start: 2023-09-26 | End: 2024-09-25

## 2023-09-26 RX ORDER — ESCITALOPRAM OXALATE 10 MG/1
10 TABLET ORAL
COMMUNITY
Start: 2023-09-02 | End: 2023-09-26

## 2023-09-26 NOTE — PROGRESS NOTES
"Subjective:       Patient ID: Chino Ramirez is a 58 y.o. male.    Chief Complaint: Establish Care    Here today to establish care with a new PCP.   He was seeing Dr. Jansen who has left [facility]    Immunizations: Tdap 2018, due Shingirx  Last Lab Work: April 2023  Colon Ca screening: FIT 2022  Prostate Ca Screening: PSA 2023    Hypogondism:  he is on IM testosterone replacement 200 mg q14 days since April 2023.  Due recheck on lab work  HLD:  Diet controlled.  Cholesterol checked Jan 2023  BRISEYDA/PTSD:  he was started on Lexapro but only took it for a week.  Otherwise he is using PRN xanax. He continues to have occ bad anxiety.  Lots of stress         Review of Systems   Constitutional:  Negative for appetite change, fatigue and fever.   HENT:  Negative for congestion, sneezing and sore throat.    Respiratory:  Negative for cough, shortness of breath and wheezing.    Cardiovascular:  Negative for chest pain and palpitations.   Gastrointestinal:  Negative for abdominal pain, constipation, diarrhea, nausea and vomiting.   Genitourinary:  Negative for difficulty urinating, dysuria, frequency and hematuria.   Neurological:  Negative for dizziness, syncope, weakness and headaches.   Psychiatric/Behavioral:  Negative for agitation, behavioral problems and confusion. The patient is not nervous/anxious.        Objective:      Vitals:    09/26/23 1340   BP: 130/86   BP Location: Left arm   Patient Position: Sitting   BP Method: Medium (Manual)   Pulse: 67   Temp: 98.5 °F (36.9 °C)   TempSrc: Oral   SpO2: 98%   Weight: 75.1 kg (165 lb 9.1 oz)   Height: 5' 6" (1.676 m)      Physical Exam  Constitutional:       General: He is not in acute distress.  Cardiovascular:      Rate and Rhythm: Normal rate and regular rhythm.      Heart sounds: Normal heart sounds. No murmur heard.  Pulmonary:      Effort: Pulmonary effort is normal. No respiratory distress.      Breath sounds: Normal breath sounds. No wheezing, rhonchi or rales. "   Skin:     General: Skin is warm and dry.   Neurological:      General: No focal deficit present.      Mental Status: He is alert.   Psychiatric:         Mood and Affect: Mood normal.         Behavior: Behavior normal.         Thought Content: Thought content normal.         Results for orders placed or performed in visit on 04/28/23   PSA, Screening   Result Value Ref Range    PSA, Screen 1.1 0.00 - 4.00 ng/mL   Follicle Stimulating Hormone   Result Value Ref Range    Follicle Stimulating Hormone 0.25 (L) 0.95 - 11.95 mIU/mL   Luteinizing Hormone   Result Value Ref Range    LH 0.3 (L) 0.6 - 12.1 mIU/mL   Testosterone   Result Value Ref Range    Testosterone, Total 238 (L) 304 - 1227 ng/dL   Prolactin   Result Value Ref Range    Prolactin 10.1 3.5 - 19.4 ng/mL      Assessment:       1. Hypogonadism male    2. Hyperlipidemia, unspecified hyperlipidemia type    3. BRISEYDA (generalized anxiety disorder)        Plan:       Hypogonadism male  -     TESTOSTERONE; Future; Expected date: 09/26/2023  -     CBC W/ AUTO DIFFERENTIAL; Future; Expected date: 09/26/2023    Hyperlipidemia, unspecified hyperlipidemia type    BRISEYDA (generalized anxiety disorder)  -     DULoxetine (CYMBALTA) 30 MG capsule; Take 1 capsule (30 mg total) by mouth once daily.  Dispense: 30 capsule; Refill: 3  -     ALPRAZolam (XANAX) 0.5 MG tablet; Take 1 tablet (0.5 mg total) by mouth daily as needed for Anxiety.  Dispense: 30 tablet; Refill: 0    Change SSRI to SNRI today.  Trial of Cymbalta.        Medication List with Changes/Refills   New Medications    DULOXETINE (CYMBALTA) 30 MG CAPSULE    Take 1 capsule (30 mg total) by mouth once daily.   Current Medications    TESTOSTERONE CYPIONATE (DEPOTESTOTERONE CYPIONATE) 200 MG/ML INJECTION    Inject 1 mL (200 mg total) into the muscle every 14 (fourteen) days.   Changed and/or Refilled Medications    Modified Medication Previous Medication    ALPRAZOLAM (XANAX) 0.5 MG TABLET ALPRAZolam (XANAX) 0.5 MG tablet        Take 1 tablet (0.5 mg total) by mouth daily as needed for Anxiety.    Take 1 tablet (0.5 mg total) by mouth daily as needed for Anxiety.   Discontinued Medications    ESCITALOPRAM OXALATE (LEXAPRO) 10 MG TABLET    Take 10 mg by mouth.    NEOMYCIN-POLYMYXIN-DEXAMETHASONE (MAXITROL) 3.5 MG/G-10,000 UNIT/G-0.1 % OINT    Apply sparingly to eyelid margin OU qhs prn to control blepharitis symptoms    OFLOXACIN (OCUFLOX) 0.3 % OPHTHALMIC SOLUTION    Place 1 drop into the left eye 3 (three) times daily.

## 2023-09-27 LAB
BASOPHILS # BLD AUTO: 0.06 K/UL (ref 0–0.2)
BASOPHILS NFR BLD: 1.2 % (ref 0–1.9)
DIFFERENTIAL METHOD: ABNORMAL
EOSINOPHIL # BLD AUTO: 0.2 K/UL (ref 0–0.5)
EOSINOPHIL NFR BLD: 4.3 % (ref 0–8)
ERYTHROCYTE [DISTWIDTH] IN BLOOD BY AUTOMATED COUNT: 13 % (ref 11.5–14.5)
HCT VFR BLD AUTO: 56.6 % (ref 40–54)
HGB BLD-MCNC: 18.2 G/DL (ref 14–18)
IMM GRANULOCYTES # BLD AUTO: 0.01 K/UL (ref 0–0.04)
IMM GRANULOCYTES NFR BLD AUTO: 0.2 % (ref 0–0.5)
LYMPHOCYTES # BLD AUTO: 2.2 K/UL (ref 1–4.8)
LYMPHOCYTES NFR BLD: 41.8 % (ref 18–48)
MCH RBC QN AUTO: 30.7 PG (ref 27–31)
MCHC RBC AUTO-ENTMCNC: 32.2 G/DL (ref 32–36)
MCV RBC AUTO: 95 FL (ref 82–98)
MONOCYTES # BLD AUTO: 0.5 K/UL (ref 0.3–1)
MONOCYTES NFR BLD: 10.3 % (ref 4–15)
NEUTROPHILS # BLD AUTO: 2.2 K/UL (ref 1.8–7.7)
NEUTROPHILS NFR BLD: 42.2 % (ref 38–73)
NRBC BLD-RTO: 0 /100 WBC
PLATELET # BLD AUTO: 249 K/UL (ref 150–450)
PMV BLD AUTO: 10.6 FL (ref 9.2–12.9)
RBC # BLD AUTO: 5.93 M/UL (ref 4.6–6.2)
TESTOST SERPL-MCNC: 663 NG/DL (ref 304–1227)
WBC # BLD AUTO: 5.14 K/UL (ref 3.9–12.7)

## 2023-11-30 DIAGNOSIS — F41.1 GAD (GENERALIZED ANXIETY DISORDER): ICD-10-CM

## 2023-11-30 RX ORDER — ALPRAZOLAM 0.5 MG/1
TABLET ORAL
Qty: 30 TABLET | Refills: 0 | Status: SHIPPED | OUTPATIENT
Start: 2023-11-30

## 2023-11-30 NOTE — TELEPHONE ENCOUNTER
Care Due:                  Date            Visit Type   Department     Provider  --------------------------------------------------------------------------------                                EP -                              PRIMARY      [facility] FAMILY  Last Visit: 09-      CARE ([facility])   MEDICINE       Abbe Agustin  Next Visit: None Scheduled  None         None Found                                                            Last  Test          Frequency    Reason                     Performed    Due Date  --------------------------------------------------------------------------------    Cr..........  12 months..  DULoxetine...............  01- 01-    [facility] Embedded Care Due Messages. Reference number: [id number].   11/30/2023 10:11:07 AM CST

## 2023-12-04 ENCOUNTER — OFFICE VISIT (OUTPATIENT)
Dept: DERMATOLOGY | Facility: CLINIC | Age: 58
End: 2023-12-04
Payer: COMMERCIAL

## 2023-12-04 DIAGNOSIS — L82.1 SEBORRHEIC KERATOSES: ICD-10-CM

## 2023-12-04 DIAGNOSIS — Z12.83 SCREENING FOR SKIN CANCER: ICD-10-CM

## 2023-12-04 DIAGNOSIS — L81.4 LENTIGINES: ICD-10-CM

## 2023-12-04 DIAGNOSIS — L82.0 INFLAMED SEBORRHEIC KERATOSIS: ICD-10-CM

## 2023-12-04 DIAGNOSIS — D22.9 MULTIPLE BENIGN NEVI: Primary | ICD-10-CM

## 2023-12-04 PROCEDURE — 99999 PR PBB SHADOW E&M-EST. PATIENT-LVL I: ICD-10-PCS | Mod: PBBFAC,,, | Performed by: DERMATOLOGY

## 2023-12-04 PROCEDURE — 17110 PR DESTRUCTION BENIGN LESIONS UP TO 14: ICD-10-PCS | Mod: S$GLB,,, | Performed by: DERMATOLOGY

## 2023-12-04 PROCEDURE — 99213 OFFICE O/P EST LOW 20 MIN: CPT | Mod: 25,S$GLB,, | Performed by: DERMATOLOGY

## 2023-12-04 PROCEDURE — 99999 PR PBB SHADOW E&M-EST. PATIENT-LVL I: CPT | Mod: PBBFAC,,, | Performed by: DERMATOLOGY

## 2023-12-04 PROCEDURE — 17110 DESTRUCTION B9 LES UP TO 14: CPT | Mod: S$GLB,,, | Performed by: DERMATOLOGY

## 2023-12-04 PROCEDURE — 99213 PR OFFICE/OUTPT VISIT, EST, LEVL III, 20-29 MIN: ICD-10-PCS | Mod: 25,S$GLB,, | Performed by: DERMATOLOGY

## 2023-12-04 NOTE — PATIENT INSTRUCTIONS
What Are the Symptoms of Skin Cancer?  A change in your skin is the most common sign of skin cancer. This could be a new growth, a sore that doesnt heal, or a change in a mole. Not all skin cancers look the same.    For melanoma specifically, a simple way to remember the warning signs is to remember the A-B-C-D-Es of melanoma--    A stands for asymmetrical. Does the mole or spot have an irregular shape with two parts that look very different?  B stands for border. Is the border irregular or jagged?  C is for color. Is the color uneven?  D is for diameter. Is the mole or spot larger than the size of a pea?  E is for evolving. Has the mole or spot changed during the past few weeks or months?    Talk to your doctor if you notice changes in your skin such as a new growth, a sore that doesnt heal, a change in an old growth, or any of the A-B-C-D-Es of melanoma    What Can I Do to Reduce My Risk of Skin Cancer?  Protection from ultraviolet (UV) radiation is important all year, not just during the summer or at the beach. UV rays from the sun can reach you on cloudy and hazy days, not just on bright and sean days. UV rays also reflect off of surfaces like water, cement, sand, and snow. Indoor tanning (using a tanning bed, gudino, or sunlamp to get tan) exposes users to UV radiation.    The hours between 10 a.m. and 4 p.m. Daylight Saving Time (9 a.m. to 3 p.m. standard time) are the most hazardous for UV exposure outdoors in the continental [place]. UV rays from sunlight are the greatest during the late spring and early summer in [place].    CDC recommends easy options for protection from UV radiation--    Stay in the shade or indoors, especially during midday hours.  Wear clothing that covers your arms and legs.  Wear a hat with a wide brim to shade your face, head, ears, and neck.  Wear sunglasses that wrap around and block both UVA and UVB rays.  Use sunscreen with a sun protection factor (SPF) of  30 or higher, and both UVA and UVB (broad spectrum) protection.  Avoid indoor tanning.    Adapted from https://www.cdc.gov/cancer/skin/basic_info/

## 2023-12-04 NOTE — PROGRESS NOTES
Subjective:      Patient ID:  Chino Ramirez is a 58 y.o. male who presents for No chief complaint on file.    HPI    Established patient.  Here today for total body skin exam.   No personal or known family hx skin cancer.    Hx of significant sun exposure in past.     Review of Systems    Objective:   Physical Exam   Constitutional: He appears well-developed and well-nourished. No distress.   Neurological: He is alert. He is not disoriented.   Psychiatric: He has a normal mood and affect.   Skin:   Areas Examined (abnormalities noted in diagram):   Scalp / Hair Palpated and Inspected  Head / Face Inspection Performed  Neck Inspection Performed  Chest / Axilla Inspection Performed  Abdomen Inspection Performed  Genitals / Buttocks / Groin Inspection Performed  Back Inspection Performed  RUE Inspected  LUE Inspection Performed  RLE Inspected  LLE Inspection Performed                 Diagram Legend     Erythematous scaling macule/papule c/w actinic keratosis       Vascular papule c/w angioma      Pigmented verrucoid papule/plaque c/w seborrheic keratosis      Yellow umbilicated papule c/w sebaceous hyperplasia      Irregularly shaped tan macule c/w lentigo     1-2 mm smooth white papules consistent with Milia      Movable subcutaneous cyst with punctum c/w epidermal inclusion cyst      Subcutaneous movable cyst c/w pilar cyst      Firm pink to brown papule c/w dermatofibroma      Pedunculated fleshy papule(s) c/w skin tag(s)      Evenly pigmented macule c/w junctional nevus     Mildly variegated pigmented, slightly irregular-bordered macule c/w mildly atypical nevus      Flesh colored to evenly pigmented papule c/w intradermal nevus       Pink pearly papule/plaque c/w basal cell carcinoma      Erythematous hyperkeratotic cursted plaque c/w SCC      Surgical scar with no sign of skin cancer recurrence      Open and closed comedones      Inflammatory papules and pustules      Verrucoid papule consistent consistent  with wart     Erythematous eczematous patches and plaques     Dystrophic onycholytic nail with subungual debris c/w onychomycosis     Umbilicated papule    Erythematous-base heme-crusted tan verrucoid plaque consistent with inflamed seborrheic keratosis     Erythematous Silvery Scaling Plaque c/w Psoriasis     See annotation      Assessment / Plan:        Inflamed seborrheic keratosis  - Discussed diagnosis, etiology, and treatment options.   - Cryosurgery Procedure Note: Discussed procedure with patient/patient's guardian including risks and benefits as well as treatment alternatives. Risks of procedure include pain, itching, swelling, redness, blistering, crusting, wound formation, post-inflammatory pigmentary alteration, scar, recurrence. Verbal consent obtained. LN2 cryosurgery performed to 2 lesion(s). Patient tolerated procedure well. After-visit wound care instructions reviewed and provided in writing.     Multiple benign nevi  - Discussed diagnosis, etiology, and benign-nature of condition.  - Reassured; no lesions suspicious for malignancy noted on exam today.   - Recommended routine self examination of skin. Discussed the ABCDEs of melanoma and ugly duckling sign.   - Recommended daily sun protection, including the use of OTC broad-spectrum sunscreen (SPF 30 or greater) and sun-protective clothing.      Lentigines  - Benign; reassured treatment not necessary.   - Recommended daily sun protection, including the use of OTC broad-spectrum sunscreen (SPF 30 or greater) and sun-protective clothing.       Seborrheic keratoses  - Benign; reassured treatment not necessary.      Screening for skin cancer  - Total body skin examination performed today.  - Findings listed above.   - Recommended routine self examination of skin.    - Recommended daily sun protection, including the use of OTC broad-spectrum sunscreen (SPF 30 or greater) and sun-protective clothing.               Follow up for annual skin checks, sooner  PRN.

## 2024-01-24 ENCOUNTER — PATIENT MESSAGE (OUTPATIENT)
Dept: PSYCHIATRY | Facility: CLINIC | Age: 59
End: 2024-01-24
Payer: COMMERCIAL
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
## 2024-01-31 ENCOUNTER — OFFICE VISIT (OUTPATIENT)
Dept: PSYCHIATRY | Facility: CLINIC | Age: 59
End: 2024-01-31
Payer: COMMERCIAL

## 2024-01-31 DIAGNOSIS — F41.1 GAD (GENERALIZED ANXIETY DISORDER): Primary | ICD-10-CM

## 2024-01-31 DIAGNOSIS — F43.10 PTSD (POST-TRAUMATIC STRESS DISORDER): ICD-10-CM

## 2024-01-31 DIAGNOSIS — F33.0 MILD EPISODE OF RECURRENT MAJOR DEPRESSIVE DISORDER: ICD-10-CM

## 2024-01-31 PROCEDURE — 90791 PSYCH DIAGNOSTIC EVALUATION: CPT | Mod: S$GLB,,,

## 2024-02-02 ENCOUNTER — TELEPHONE (OUTPATIENT)
Dept: PSYCHIATRY | Facility: CLINIC | Age: 59
End: 2024-02-02
Payer: COMMERCIAL

## 2024-02-02 NOTE — PROGRESS NOTES
"Individual Psychotherapy (PhD/LCSW)    02/08/2024    Interim Events/Subjective Report/Content of Current Session:  follow-up appointment.    Pt is a 58 y.o. male with past psychiatric hx of anxiety, depression, PTSD who presents for follow-up treatment.    Pt reported he went to lunch with dad after last session; lots of apologies on both sides. Pt reported he plans to meet with dad later today to discuss future business plans and upcoming projects. Pt reported "I'm accepting that my dad has a mental illness and that he doesn't accept that he does. I just want to make the best of our relationship during the time he has left."    Pt discussed looking forward to getting back to work; pt discussed foreseeing many challenges. Pt discussed feeling apprehensive because he used to operate so much on memory; pt hopes he can perform the job well. Pt discussed putting intense pressure on self and feeling additional pressure from father; pt expressed being worried about the backlash if something goes wrong on the upcoming job.     Discussed re-framing thoughts and challenging negative thoughts.      Pt processed feelings about working on past jobs.     Pt reported working out 2 days per week; weightlifting. Pt reported going on a bike ride daily. Pt discussed using meditations "off and on" in the morning or evening to aid relaxation. Pt expressed concern for utilizing his new coping skills when returning to work. Discussed utilizing mini-breaks and a planner; pt amenable.     Pt discussed having passive SI with no plan or intent last week; pt reported "my children have been saving jefe. I would never leave them." Discussed safety planning; provided 211 and 988 as a resource.     Pt discussed enjoying taking son and son's girlfriend fishing yesterday. Pt reported son works at [employer] and working on his Associate's Degree online in Business. Pt discussed feeling proud of son for doing better because son is going to " work daily and overcoming social anxiety by talking to more people; pt reported son is happy pt is returning to work.     Discussed journaling; pt amenable.     Current symptoms:  Depression: dysphoric mood, fatigue and difficulty concentrating  Anxiety: excessive worrying, restlessness, and muscle tension.  Sleep:  denies issues .  Lata:  denies.  Psychosis: denies .    Therapeutic Intervention/Techniques: behavior modification, insight oriented, interactive, and supportive; relevant to diagnosis, patient responds to this modality    Will continue to follow.   Pt aware to contact sw for any additional needs that may occur prior to next session.    Risk Parameters:  Patient reports no homicidal ideation  Patient reports no self-injurious behavior  Patient reports no violent behavior    Diagnosis:   1. BRISEYDA (generalized anxiety disorder)        2. Mild episode of recurrent major depressive disorder        3. PTSD (post-traumatic stress disorder)            Return to Clinic: as scheduled  Counseling time: 45  -Call to report any worsening of symptoms or problems associated with medication.  - Pt instructed to go to ER if thoughts of harming self or others arise.   -Supportive therapy and psychoeducation provided  -Pt instructed to call clinic, 911 or go to nearest emergency room if sxs worsen or pt is in crisis. The pt expresses understanding.   Each patient to whom he or she provides medical services by telemedicine is:  (1) informed of the relationship between the physician and patient and the respective role of any other health care provider with respect to management of the patient; and (2) notified that he or she may decline to receive medical services by telemedicine and may withdraw from such care at any time.

## 2024-02-08 ENCOUNTER — OFFICE VISIT (OUTPATIENT)
Dept: PSYCHIATRY | Facility: CLINIC | Age: 59
End: 2024-02-08
Payer: COMMERCIAL

## 2024-02-08 DIAGNOSIS — F41.1 GAD (GENERALIZED ANXIETY DISORDER): Primary | ICD-10-CM

## 2024-02-08 DIAGNOSIS — F33.0 MILD EPISODE OF RECURRENT MAJOR DEPRESSIVE DISORDER: ICD-10-CM

## 2024-02-08 DIAGNOSIS — F43.10 PTSD (POST-TRAUMATIC STRESS DISORDER): ICD-10-CM

## 2024-02-08 PROCEDURE — 90834 PSYTX W PT 45 MINUTES: CPT | Mod: S$GLB,,,
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
## 2024-02-21 DIAGNOSIS — M25.561 RIGHT KNEE PAIN, UNSPECIFIED CHRONICITY: Primary | ICD-10-CM

## 2024-02-26 ENCOUNTER — OFFICE VISIT (OUTPATIENT)
Dept: ORTHOPEDICS | Facility: CLINIC | Age: 59
End: 2024-02-26
Payer: COMMERCIAL

## 2024-02-26 ENCOUNTER — HOSPITAL ENCOUNTER (OUTPATIENT)
Dept: RADIOLOGY | Facility: HOSPITAL | Age: 59
Discharge: HOME | End: 2024-02-26
Attending: ORTHOPAEDIC SURGERY
Payer: COMMERCIAL

## 2024-02-26 VITALS — BODY MASS INDEX: 26.52 KG/M2 | HEIGHT: 66 IN | WEIGHT: 165 LBS

## 2024-02-26 DIAGNOSIS — M17.0 PRIMARY OSTEOARTHRITIS OF BOTH KNEES: Primary | ICD-10-CM

## 2024-02-26 DIAGNOSIS — G89.29 CHRONIC PAIN OF RIGHT KNEE: Primary | ICD-10-CM

## 2024-02-26 DIAGNOSIS — M25.561 RIGHT KNEE PAIN, UNSPECIFIED CHRONICITY: ICD-10-CM

## 2024-02-26 DIAGNOSIS — M17.11 OSTEOARTHRITIS OF RIGHT KNEE, UNSPECIFIED OSTEOARTHRITIS TYPE: ICD-10-CM

## 2024-02-26 DIAGNOSIS — M25.561 CHRONIC PAIN OF RIGHT KNEE: Primary | ICD-10-CM

## 2024-02-26 PROCEDURE — 73562 X-RAY EXAM OF KNEE 3: CPT | Mod: 26,RT,, | Performed by: RADIOLOGY

## 2024-02-26 PROCEDURE — 99999 PR PBB SHADOW E&M-EST. PATIENT-LVL III: CPT | Mod: PBBFAC,,, | Performed by: ORTHOPAEDIC SURGERY

## 2024-02-26 PROCEDURE — 3008F BODY MASS INDEX DOCD: CPT | Mod: CPTII,S$GLB,, | Performed by: ORTHOPAEDIC SURGERY

## 2024-02-26 PROCEDURE — 99203 OFFICE O/P NEW LOW 30 MIN: CPT | Mod: S$GLB,,, | Performed by: ORTHOPAEDIC SURGERY

## 2024-02-26 PROCEDURE — 73562 X-RAY EXAM OF KNEE 3: CPT | Mod: TC,PO,RT

## 2024-02-26 PROCEDURE — 73560 X-RAY EXAM OF KNEE 1 OR 2: CPT | Mod: 26,59,LT, | Performed by: RADIOLOGY

## 2024-02-26 PROCEDURE — 1159F MED LIST DOCD IN RCRD: CPT | Mod: CPTII,S$GLB,, | Performed by: ORTHOPAEDIC SURGERY

## 2024-02-26 PROCEDURE — 73560 X-RAY EXAM OF KNEE 1 OR 2: CPT | Mod: TC,PO,LT

## 2024-02-26 NOTE — PROGRESS NOTES
58 years old right knee pain for about 4 months time noticed after working out sharp pain for on good days 6 on bad days ice seems to help makes it worse     Patient has a history of knee arthroscopy by myself several years ago     Exam shows tenderness medial joint line no signs infection instability skin is intact compartments soft     X-rays show medial joint space narrowing    Assessment:  Right knee arthrosis     Plan:  We discussed with the patient activity modifications, over-the-counter oral medications, strengthening, bracing, physical therapy, and partial knee replacement.  Physical therapy at this point, follow-up as needed    
Statement Selected

## 2024-02-27 ENCOUNTER — OFFICE VISIT (OUTPATIENT)
Dept: PSYCHIATRY | Facility: CLINIC | Age: 59
End: 2024-02-27
Payer: COMMERCIAL

## 2024-02-27 DIAGNOSIS — F43.10 PTSD (POST-TRAUMATIC STRESS DISORDER): ICD-10-CM

## 2024-02-27 DIAGNOSIS — F41.1 GAD (GENERALIZED ANXIETY DISORDER): Primary | ICD-10-CM

## 2024-02-27 DIAGNOSIS — F33.0 MILD EPISODE OF RECURRENT MAJOR DEPRESSIVE DISORDER: ICD-10-CM

## 2024-02-27 PROCEDURE — 90834 PSYTX W PT 45 MINUTES: CPT | Mod: S$GLB,,,

## 2024-02-29 ENCOUNTER — TELEPHONE (OUTPATIENT)
Dept: ORTHOPEDICS | Facility: CLINIC | Age: 59
End: 2024-02-29
Payer: COMMERCIAL

## 2024-02-29 DIAGNOSIS — G89.29 CHRONIC PAIN OF RIGHT KNEE: Primary | ICD-10-CM

## 2024-02-29 DIAGNOSIS — M25.561 CHRONIC PAIN OF RIGHT KNEE: Primary | ICD-10-CM

## 2024-02-29 DIAGNOSIS — M17.0 PRIMARY OSTEOARTHRITIS OF BOTH KNEES: ICD-10-CM

## 2024-02-29 NOTE — TELEPHONE ENCOUNTER
Informed Mr. Reich Dr. Lakhani office doesn't write for permeant disability handicap tags. He will have to contact his PCP for that request. We did offer him a temporarily impaired handicap tag. Patient stated he wanted permanently. Patient verbalized understanding.

## 2024-02-29 NOTE — TELEPHONE ENCOUNTER
----- Message from Ofelia Hernandez MA sent at 2/29/2024 11:13 AM CST -----  Contact: pt  Seen this week, wants handicapped license  and or accupuncture order   Call back

## 2024-03-01 ENCOUNTER — TELEPHONE (OUTPATIENT)
Dept: ORTHOPEDICS | Facility: CLINIC | Age: 59
End: 2024-03-01
Payer: COMMERCIAL

## 2024-03-01 NOTE — TELEPHONE ENCOUNTER
----- Message from Kellee Irizarry sent at 3/1/2024 11:58 AM CST -----  Type: Needs Medical Advice  Who Called:  pt     Best Call Back Number: [phone] (home) [phone] (work)    Additional Information: pt requesting a handicapped sticker please advise

## 2024-03-01 NOTE — TELEPHONE ENCOUNTER
Attempted to contact. Line kept ringing, no vm, unable to leave message. Will leave temporary handicap tag at the entrance 3 first floor registration desk.

## 2024-03-04 ENCOUNTER — OFFICE VISIT (OUTPATIENT)
Dept: OPTOMETRY | Facility: CLINIC | Age: 59
End: 2024-03-04
Payer: COMMERCIAL

## 2024-03-04 ENCOUNTER — TELEPHONE (OUTPATIENT)
Dept: DERMATOLOGY | Facility: CLINIC | Age: 59
End: 2024-03-04
Payer: COMMERCIAL

## 2024-03-04 DIAGNOSIS — H10.829 ROSACEA BLEPHAROCONJUNCTIVITIS: Primary | ICD-10-CM

## 2024-03-04 DIAGNOSIS — H11.232 SYMBLEPHARON OF LEFT EYE: ICD-10-CM

## 2024-03-04 DIAGNOSIS — H52.03 HYPEROPIA WITH PRESBYOPIA OF BOTH EYES: ICD-10-CM

## 2024-03-04 DIAGNOSIS — H52.4 HYPEROPIA WITH PRESBYOPIA OF BOTH EYES: ICD-10-CM

## 2024-03-04 DIAGNOSIS — H43.393 VITREOUS FLOATERS, BILATERAL: ICD-10-CM

## 2024-03-04 DIAGNOSIS — Z13.5 GLAUCOMA SCREENING: ICD-10-CM

## 2024-03-04 PROCEDURE — 99999 PR PBB SHADOW E&M-EST. PATIENT-LVL III: CPT | Mod: PBBFAC,,, | Performed by: OPTOMETRIST

## 2024-03-04 PROCEDURE — 99213 OFFICE O/P EST LOW 20 MIN: CPT | Mod: S$GLB,,, | Performed by: OPTOMETRIST

## 2024-03-04 PROCEDURE — 1159F MED LIST DOCD IN RCRD: CPT | Mod: CPTII,S$GLB,, | Performed by: OPTOMETRIST

## 2024-03-04 NOTE — TELEPHONE ENCOUNTER
----- Message from Cydney Pickett sent at 3/4/2024  8:29 AM CST -----  Regarding: Needs sooner appt  Type:  Sooner Appointment Request    Caller is requesting a sooner appointment.  Caller declined first available appointment listed below.  Caller will not accept being placed on the waitlist and is requesting a message be sent to doctor.    Name of Caller:  Chino  When is the first available appointment?  July  Symptoms:  Pt has a spot that extremely itching him, please advise  Would the patient rather a call back or a response via [facility]? Call back  Best Call Back Number:  [phone]    Additional Information:

## 2024-03-04 NOTE — PROGRESS NOTES
HPI    Pt here for urgent visit after noticing tissue appear in OS only. Pt does   note slight change to VA. Denies flashes and floaters, no gtt.  Last edited by Marilynn Lujan on 3/4/2024  4:16 PM.            Assessment /Plan     For exam results, see Encounter Report.    Rosacea blepharoconjunctivitis    Symblepharon of left eye    Vitreous floaters, bilateral    Glaucoma screening    Hyperopia with presbyopia of both eyes      Longstanding OS>OD w/ mgd/ telang vessels ---currently w/ low s/s   Continue lid hygiene w/ scrubs and warm compress   Can use maxitrol zeke from previous ---bid x 5 days then qhs for symptoms     2.   Small area of sympbleph laterally at canthus // inf lid and culdesac look good   3.   RD precautions given and reviewed. Patient knows to call/ message if any further changes in symptoms occur.  4.   Not suspect   5.   Updated specs Rx, gave reduced copy for PAL (full distance ~ +2.00 )---has been wearing +1.75 readers for all tasks     Discussed and educated patient on current findings /plan.  RTC 1 year, prn if any changes / issues

## 2024-03-04 NOTE — PATIENT INSTRUCTIONS
"DRY EYES -- BURNING OR CHARITY SYMPTOMS:  Use Over The Counter artificial tears as needed for dry eye symptoms.   Some common brands include:  Systane, Optive, Refresh, and Thera-Tears.  These drops can be used as frequently as desired, but may be most helpful use during long periods of concentrated work.  For example, reading / working at the computer. Start with 3-4x per day.     Nighttime Ophthalmic gel or ointments are available: Refresh PM, Genteal, and Lacrilube.    Avoid drops that "get redness out" (Visine, Murine, Clear Eyes), as these may contain medication that could further irritate the eyes, especially with chronic use.    ALLERGY EYES -- ITCHING SYMPTOMS:  Over the counter medications include--Pataday, Zaditor, and Alaway.  Use as directed 1-2 drops daily for symptoms of itching / watering eyes.  These drops will not help for dry eye or exposure symptoms.    REDNESS RELIEF:  Lumify---is a good redness reliever that will not cause irritation if used chronically.       FLASHES / FLOATERS / POSTERIOR VITREOUS DETACHMENT    Call the clinic if you have any further changes in symptoms.  Including:  Increased numbers of floaters or flashing lights, dimness or darkness that moves through or stays constant in your vision, or any pain in the eye (s).    You may sometimes see small specks or clouds moving in your field of vision.  They are called FLOATERS.  You can often see them when looking at a plain background, like a blank wall or blue yohan.  Floaters are actually tiny clumps of gel or cells inside the VITREOUS, the clear jelly-like fluid that fills the inside of your eye.    While these objects look like they are in front of your eye, they are actually floating inside.  What you see are the shadows they cast on the RETINA, the nerve layer at the back of the eye that senses light and allows you to see.      POSTERIOR VITREOUS DETACHMENT    The appearance of new floaters may be alarming.  If you suddenly develop " new floaters, you should contact your eye care professional  right away.    The retina can tear if the shrinking vitreous pulls away from the wall of the eye.  This sometimes causes a small amount of bleeding in the eye that may appear as new floaters.    A torn retina is always a serious problem, since it can lead to a retinal detachment.  You should see your eye care professional as soon as possible if:    even one new floater appears suddenly;  you see sudden flashes of light;  you notice other symptoms, like the loss of side vision, or a curtain closes down in your vision        POSTERIOR VITREOUS DETACHMENT is more common for people who:    are nearsighted;  have had cataract surgery;  have had YAG laser surgery of the eye;  have had inflammation inside the eye;  are over age 60.      While some floaters may remain visible, many of them will fade over time and become less noticeable.  Even if you've had some floaters for years, you should have your eyes checked as soon as possible if you notice new ones.    FLASHING LIGHTS    When the vitreous gel rubs or pulls on the retina, you may see what look like flashing lights or lightning streaks.  These flashes can appear off and on for several weeks or months.      Some people experience flashes of light that appear as jagged lines or heat waves in both eyes, lasting 10-20 minutes.  These flashes are caused by a spasm of blood vessels in the brain, which is called a migraine.    If a headache follows these flashes, it's called a migraine headache.  If   no headache occurs, these flashes are called Ophthalmic or Ocular Migraine.          BLEPHARITIS & TREATMENT   Definition  Blepharitis is an inflammation of the eyelid margin, which causes itchy, irritated, and often red eyes. One common cause is staphylococcus, skin bacteria, which can thrive in these conditions and can possibly cause eye damage such as corneal scarring.   Occurrence  Blepharitis is a very common  problem seen particularly in people with a tendency toward oily skin, dandruff, or dry eyes. Though most frequently seen later in life, blepharitis can begin in childhood.  It can also be associated with hormonal changes (puberty, menopause), or changes in medications.  It is also common in patients with Rosacea.  Symptoms  Lid Crusting - The lids are often reddened, somewhat swollen and scaly appearing at the base of the lashes. These scales, as they become coarser, form crusts that cause the lids to stick together. This is especially prominent upon waking.   Itching - The inflammation of the lids will cause itching and irritation.   Tearing/ Light Sensitivity -The eyes may tear more frequently and may also be sensitive to bright light.  Treatment  Treatment is aimed at lowering the number of bacteria along the eyelid margin and decreasing the scales by lid hygiene and in some cases antibiotic/steroid medications. The goal is to control this problem and prevent it from becoming a more serious condition. Treatment is focused on keeping the condition under control by routine daily lid hygiene. Unfortunately, if the treatment is stopped the symptoms often recur.    Upon waking, place a clean, warm, wet, washcloth over your closed eyelids (upper and lower) for several minutes.  This may be accomplished by allowing warm shower water to run with eyes closed.  Place a small amount of diluted (1/4 strength) Baby Shampoo or a commercial lid cleaning solution on a cotton swab, washcloth, or your clean fingertip. Gently pull down on your lower lid and use a horizontal back and forth motion to scrub the edge of your lid at the base of the eyelashes. Do not scrub the inside of the lid or the skin on the outside. Close the eye and use the cotton swab to scrub along the base of the upper lid lashes. Rinse the shampoo away with warm water.   Additionally your doctor may ask that you use an antibiotic/steroid drop or ointment for a  few weeks. Use as directed.   Perform this procedure 2 times a day for the first 7 days and then cut back to once a day. (Or per your doctors recommendation.) You may need to continue lid scrubs on a daily basis, though some find they can successfully control their blepharitis symptoms with scrubs done 2 to 3 times a week.    Medication--use as directed if medication is prescribed    ________________________________________________________________________

## 2024-03-08 ENCOUNTER — CLINICAL SUPPORT (OUTPATIENT)
Dept: REHABILITATION | Facility: HOSPITAL | Age: 59
End: 2024-03-08
Payer: COMMERCIAL

## 2024-03-08 DIAGNOSIS — M17.0 PRIMARY OSTEOARTHRITIS OF BOTH KNEES: ICD-10-CM

## 2024-03-08 DIAGNOSIS — R29.898 WEAKNESS OF BOTH LOWER EXTREMITIES: ICD-10-CM

## 2024-03-08 DIAGNOSIS — G89.29 CHRONIC PAIN OF RIGHT KNEE: Primary | ICD-10-CM

## 2024-03-08 DIAGNOSIS — M25.561 CHRONIC PAIN OF RIGHT KNEE: Primary | ICD-10-CM

## 2024-03-08 PROCEDURE — 97161 PT EVAL LOW COMPLEX 20 MIN: CPT | Mod: PO

## 2024-03-08 PROCEDURE — 97110 THERAPEUTIC EXERCISES: CPT | Mod: PO

## 2024-03-14 NOTE — PROGRESS NOTES
[facility]   Physical Therapy Treatment Note     Name: Chino Ramirez  [facility] Number: [id number]    Therapy Diagnosis:   Encounter Diagnoses   Name Primary?    Weakness of both lower extremities Yes    Chronic pain of right knee      Physician: Esa Lakhani MD    Visit Date: 3/15/2024  Physician Orders: PT Eval and Treat   Medical Diagnosis from Referral: Primary osteoarthritis of both knees [M17.0]  Evaluation Date: 3/8/2024  Authorization Period Expiration: 2/25/25  Plan of Care Expiration: 5/8/24  Progress Note Due: 4/8/24  Visit # / Visits authorized: 1/ 20   FOTO: 1/3    PTA Visit #: 1/5     Precautions: Standard     Time In: 0905 AM  Time Out: 1000 AM  Total Billable Time: 40 minutes    SUBJECTIVE     Pt reports: his knees are feeling better today. Patient states he is pain free currently.   He was compliant with home exercise program.  Response to previous treatment: no adverse effects   Functional change: too soon to determine     Pain: 0/10  Location: bilateral knee      OBJECTIVE     Objective Measures updated at progress report unless specified.     Treatment     Damir received the treatments listed below:      therapeutic exercises to develop strength, endurance, ROM, flexibility, posture, and core stabilization for 30 minutes including:  Upright bike x 7 minutes  Standing gastroc stretch on incline x 1.5 minutes x 2   BLE Leg press (seat 3) 100# 2x 10  SL leg press x 50#, 2x10  Knee flexion machine unilaterally 30# 2 x 10 B  Knee extension machine unilaterally 20# 2 x 10 B  Bridges 2x10    neuromuscular re-education activities to improve: Balance, Coordination, Kinesthetic, Proprioception, and Posture for 15 minutes. The following activities were included:  Quad set + heel lift 3x10, 3 sec hold   Quad set + SLR 2x10, 3 sec hold   S/L clamshells (green TB) 2x10, 3 sec hold   Lateral walking (Green TB at thighs) x 30 feet x 3     therapeutic activities to improve  functional performance for 10 minutes, including:  Step ups (6 inch) forward and lateral 2x10 ea direction  Mini squats 2x10    Patient Education and Home Exercises     Home Exercises Provided and Patient Education Provided     Education provided:   - HEP compliance     Written Home Exercises Provided: Patient instructed to cont prior HEP. Exercises were reviewed and Damir was able to demonstrate them prior to the end of the session.  Damir demonstrated good  understanding of the education provided. See EMR under Patient Instructions for exercises provided during therapy sessions    ASSESSMENT     Damir achieves training effect easily with gluteal focused activities. Tactile and visual cues provided for awareness and correction of valgus collapse especially with dynamic activities. Patient without complaints of knee pain during today's treatment session. Good tolerance to progression of quad strengthening with improvements in motor control noted with repetitions.     Damir Is progressing well towards his goals.   Pt prognosis is Excellent.     Pt will continue to benefit from skilled outpatient physical therapy to address the deficits listed in the problem list box on initial evaluation, provide pt/family education and to maximize pt's level of independence in the home and community environment.     Pt's spiritual, cultural and educational needs considered and pt agreeable to plan of care and goals.     Anticipated barriers to physical therapy: Chronicity of pain    Goals:   Short Term Goals: 4 weeks   Pt will be independent with the initial phase of their HEP in order to continue to make functional gains outside of physical therapy.  Pt will be able to tolerate 40 pounds unilaterally on the knee flexion machine for at least 2 sets of 10 repetitions to work on LE strengthening.  Pt will be able to tolerate 40 pounds unilaterally on the knee extension machine for at least 2 sets of 10 repetitions to work on LE  strengthening.     Pt will be able to tolerate 130 pounds on the leg press to work on LE strengthening.  Pt's at worse right knee pain will decrease to 6/10 in order for him to tolerate ADLs better.       Long Term Goals: 8 weeks   Pt will be independent with the final phase of their HEP in order to continue to make functional gains outside of physical therapy.  Pt will be able to tolerate 50 pounds unilaterally on the knee flexion machine for at least 2 sets of 10 repetitions to work on LE strengthening.  Pt will be able to tolerate 50 pounds unilaterally on the knee extension machine for at least 2 sets of 10 repetitions to work on LE strengthening.     Pt will be able to tolerate 160 pounds on the leg press to work on LE strengthening.  Pt's at worse right knee pain will decrease to 4/10 in order for him to tolerate ADLs better.       PLAN     Continue current POC with emphasis on improving functional strength and decreasing pain.     Adelia Ham, PTA

## 2024-03-15 ENCOUNTER — CLINICAL SUPPORT (OUTPATIENT)
Dept: REHABILITATION | Facility: HOSPITAL | Age: 59
End: 2024-03-15
Payer: COMMERCIAL

## 2024-03-15 DIAGNOSIS — R29.898 WEAKNESS OF BOTH LOWER EXTREMITIES: Primary | ICD-10-CM

## 2024-03-15 DIAGNOSIS — M25.561 CHRONIC PAIN OF RIGHT KNEE: ICD-10-CM

## 2024-03-15 DIAGNOSIS — G89.29 CHRONIC PAIN OF RIGHT KNEE: ICD-10-CM

## 2024-03-15 PROCEDURE — 97110 THERAPEUTIC EXERCISES: CPT | Mod: PO,CQ

## 2024-03-15 PROCEDURE — 97530 THERAPEUTIC ACTIVITIES: CPT | Mod: PO,CQ

## 2024-03-15 PROCEDURE — 97112 NEUROMUSCULAR REEDUCATION: CPT | Mod: PO,CQ

## 2024-03-22 ENCOUNTER — CLINICAL SUPPORT (OUTPATIENT)
Dept: REHABILITATION | Facility: HOSPITAL | Age: 59
End: 2024-03-22
Payer: COMMERCIAL

## 2024-03-22 DIAGNOSIS — G89.29 CHRONIC PAIN OF RIGHT KNEE: ICD-10-CM

## 2024-03-22 DIAGNOSIS — M25.561 CHRONIC PAIN OF RIGHT KNEE: ICD-10-CM

## 2024-03-22 DIAGNOSIS — R29.898 WEAKNESS OF BOTH LOWER EXTREMITIES: Primary | ICD-10-CM

## 2024-03-22 PROCEDURE — 97112 NEUROMUSCULAR REEDUCATION: CPT | Mod: PO,CQ

## 2024-03-22 PROCEDURE — 97110 THERAPEUTIC EXERCISES: CPT | Mod: PO,CQ

## 2024-03-22 PROCEDURE — 97530 THERAPEUTIC ACTIVITIES: CPT | Mod: PO,CQ

## 2024-03-22 NOTE — PROGRESS NOTES
[facility]   Physical Therapy Treatment Note     Name: Chino Ramirez  [facility] Number: [id number]    Therapy Diagnosis:   Encounter Diagnoses   Name Primary?    Weakness of both lower extremities Yes    Chronic pain of right knee      Physician: Esa Lakhani MD    Visit Date: 3/22/2024  Physician Orders: PT Eval and Treat   Medical Diagnosis from Referral: Primary osteoarthritis of both knees [M17.0]  Evaluation Date: 3/8/2024  Authorization Period Expiration: 2/25/25  Plan of Care Expiration: 5/8/24  Progress Note Due: 4/8/24  Visit # / Visits authorized: 3/ 20   FOTO: 1/3    PTA Visit #: 1/5     Precautions: Standard     Time In: 9:13 AM  Time Out: 10:10 AM  Total Billable Time: 57 minutes    SUBJECTIVE     Pt reports:  he is pain free currently.   He was compliant with home exercise program.  Response to previous treatment: no adverse effects   Functional change: too soon to determine     Pain: 0/10  Location: bilateral knee      OBJECTIVE     Objective Measures updated at progress report unless specified.     Treatment     Damir received the treatments listed below:      therapeutic exercises to develop strength, endurance, ROM, flexibility, posture, and core stabilization for 27 minutes including:  Upright bike x 10 minutes  Standing gastroc stretch on incline x 2 minutes   BLE Leg press (seat 3) 100# 3x 10  SL leg press x 50#, 3x10  Knee flexion machine unilaterally 30# 3x 10 B  Knee extension machine unilaterally 20# 3 x 10 B  Bridges 2x10    neuromuscular re-education activities to improve: Balance, Coordination, Kinesthetic, Proprioception, and Posture for 15 minutes. The following activities were included:  Quad set + heel lift 3x10, 3 sec hold   Quad set + SLR 3x10, 3 sec hold   S/L clamshells (green TB) 2x10, 3 sec hold   Lateral walking (Green TB at thighs) x 30 feet x 3     therapeutic activities to improve functional performance for 10 minutes, including:  Step ups  (6 inch) forward and lateral 2x10 ea direction  Mini squats 2x10    Patient Education and Home Exercises     Home Exercises Provided and Patient Education Provided     Education provided:   - HEP compliance     Written Home Exercises Provided: Patient instructed to cont prior HEP. Exercises were reviewed and Damir was able to demonstrate them prior to the end of the session.  Damir demonstrated good  understanding of the education provided. See EMR under Patient Instructions for exercises provided during therapy sessions    ASSESSMENT     Damir lisette today's tx with progression of ther ex, ther act and neuromuscular re-ed well. He was able to progress with reps with activities today w/o difficulty or complaint. He achieves training effect easily with gluteal focused activities. Tactile and visual cues provided for awareness and correction to avoid valgus collapse especially with dynamic activities. Patient without complaints of knee pain during today's treatment session. Good tolerance to progression of quad strengthening with improvements in motor control noted with repetitions.     Damir Is progressing well towards his goals.   Pt prognosis is Excellent.     Pt will continue to benefit from skilled outpatient physical therapy to address the deficits listed in the problem list box on initial evaluation, provide pt/family education and to maximize pt's level of independence in the home and community environment.     Pt's spiritual, cultural and educational needs considered and pt agreeable to plan of care and goals.     Anticipated barriers to physical therapy: Chronicity of pain    Goals:   Short Term Goals: 4 weeks   Pt will be independent with the initial phase of their HEP in order to continue to make functional gains outside of physical therapy.  Pt will be able to tolerate 40 pounds unilaterally on the knee flexion machine for at least 2 sets of 10 repetitions to work on LE strengthening.  Pt will be able to  tolerate 40 pounds unilaterally on the knee extension machine for at least 2 sets of 10 repetitions to work on LE strengthening.     Pt will be able to tolerate 130 pounds on the leg press to work on LE strengthening.  Pt's at worse right knee pain will decrease to 6/10 in order for him to tolerate ADLs better.       Long Term Goals: 8 weeks   Pt will be independent with the final phase of their HEP in order to continue to make functional gains outside of physical therapy.  Pt will be able to tolerate 50 pounds unilaterally on the knee flexion machine for at least 2 sets of 10 repetitions to work on LE strengthening.  Pt will be able to tolerate 50 pounds unilaterally on the knee extension machine for at least 2 sets of 10 repetitions to work on LE strengthening.     Pt will be able to tolerate 160 pounds on the leg press to work on LE strengthening.  Pt's at worse right knee pain will decrease to 4/10 in order for him to tolerate ADLs better.       PLAN     Continue current POC with emphasis on improving functional strength and decreasing pain.     Douglas Fowler, PTA

## 2024-03-25 ENCOUNTER — CLINICAL SUPPORT (OUTPATIENT)
Dept: REHABILITATION | Facility: HOSPITAL | Age: 59
End: 2024-03-25
Payer: COMMERCIAL

## 2024-03-25 DIAGNOSIS — G89.29 CHRONIC PAIN OF RIGHT KNEE: ICD-10-CM

## 2024-03-25 DIAGNOSIS — M25.561 CHRONIC PAIN OF RIGHT KNEE: ICD-10-CM

## 2024-03-25 DIAGNOSIS — R29.898 WEAKNESS OF BOTH LOWER EXTREMITIES: Primary | ICD-10-CM

## 2024-03-25 PROCEDURE — 97110 THERAPEUTIC EXERCISES: CPT | Mod: PO,CQ

## 2024-03-25 PROCEDURE — 97112 NEUROMUSCULAR REEDUCATION: CPT | Mod: PO,CQ

## 2024-03-25 PROCEDURE — 97530 THERAPEUTIC ACTIVITIES: CPT | Mod: PO,CQ

## 2024-03-25 NOTE — PROGRESS NOTES
[facility]   Physical Therapy Treatment Note     Name: Chino Ramirez  [facility] Number: [id number]    Therapy Diagnosis:   Encounter Diagnoses   Name Primary?    Weakness of both lower extremities Yes    Chronic pain of right knee      Physician: Esa Lakhani MD    Visit Date: 3/25/2024  Physician Orders: PT Eval and Treat   Medical Diagnosis from Referral: Primary osteoarthritis of both knees [M17.0]  Evaluation Date: 3/8/2024  Authorization Period Expiration: 2/25/25  Plan of Care Expiration: 5/8/24  Progress Note Due: 4/8/24  Visit # / Visits authorized: 3/ 20   FOTO: 1/3    PTA Visit #: 3/5     Precautions: Standard     Time In: 0900 AM  Time Out: 0950 AM  Total Billable Time: 50 minutes    SUBJECTIVE     Pt reports: he is pain free currently. Patient states he did spend a lot of time on his feet yesterday and he noticed soreness in his knee. He was compliant with home exercise program.  Response to previous treatment: no adverse effects   Functional change: too soon to determine     Pain: 0/10  Location: bilateral knee      OBJECTIVE     Objective Measures updated at progress report unless specified.     Treatment     Damir received the treatments listed below:      therapeutic exercises to develop strength, endurance, ROM, flexibility, posture, and core stabilization for 27 minutes including:  Upright bike x 10 minutes  Standing gastroc stretch on incline x 2 minutes   BLE Leg press (seat 3) 100# 3x 10  SL leg press x 50#, 3x10  Knee flexion machine unilaterally 30# 3x 10 B  Bridges 2x10    neuromuscular re-education activities to improve: Balance, Coordination, Kinesthetic, Proprioception, and Posture for 15 minutes. The following activities were included:  Quad set + heel lift 3x10, 3 sec hold   Quad set + SLR 3x10, 1# 3 sec hold   S/L clamshells (green TB) 2x10, 3 sec hold   Lateral walking (Green TB at thighs) x 30 feet x 3   2 to 1 on knee extension (eccentric focus)  2x10, 20# (B)     therapeutic activities to improve functional performance for 10 minutes, including:  Step ups (6 inch) forward and lateral 2x10 ea direction  Mini squats 2x10    Patient Education and Home Exercises     Home Exercises Provided and Patient Education Provided     Education provided:   - HEP compliance     Written Home Exercises Provided: Patient instructed to cont prior HEP. Exercises were reviewed and Damir was able to demonstrate them prior to the end of the session.  Damir demonstrated good  understanding of the education provided. See EMR under Patient Instructions for exercises provided during therapy sessions    ASSESSMENT     Damir able to complete today's inventions without complaints of (R) knee pain. Patient demonstrating active TKE indicating improving motor control of quad musculature. Improving control of step ups without evidence of valgus collapse but did incorporate eccentric training due to intermittent pain when coming down steps.     Damir Is progressing well towards his goals.   Pt prognosis is Excellent.     Pt will continue to benefit from skilled outpatient physical therapy to address the deficits listed in the problem list box on initial evaluation, provide pt/family education and to maximize pt's level of independence in the home and community environment.     Pt's spiritual, cultural and educational needs considered and pt agreeable to plan of care and goals.     Anticipated barriers to physical therapy: Chronicity of pain    Goals:   Short Term Goals: 4 weeks   Pt will be independent with the initial phase of their HEP in order to continue to make functional gains outside of physical therapy.  Pt will be able to tolerate 40 pounds unilaterally on the knee flexion machine for at least 2 sets of 10 repetitions to work on LE strengthening.  Pt will be able to tolerate 40 pounds unilaterally on the knee extension machine for at least 2 sets of 10 repetitions to work on LE  strengthening.     Pt will be able to tolerate 130 pounds on the leg press to work on LE strengthening.  Pt's at worse right knee pain will decrease to 6/10 in order for him to tolerate ADLs better.       Long Term Goals: 8 weeks   Pt will be independent with the final phase of their HEP in order to continue to make functional gains outside of physical therapy.  Pt will be able to tolerate 50 pounds unilaterally on the knee flexion machine for at least 2 sets of 10 repetitions to work on LE strengthening.  Pt will be able to tolerate 50 pounds unilaterally on the knee extension machine for at least 2 sets of 10 repetitions to work on LE strengthening.     Pt will be able to tolerate 160 pounds on the leg press to work on LE strengthening.  Pt's at worse right knee pain will decrease to 4/10 in order for him to tolerate ADLs better.       PLAN     Continue current POC with emphasis on improving functional strength and decreasing pain.     Adelia Ham, PTA

## 2024-03-28 ENCOUNTER — CLINICAL SUPPORT (OUTPATIENT)
Dept: REHABILITATION | Facility: HOSPITAL | Age: 59
End: 2024-03-28
Payer: COMMERCIAL

## 2024-03-28 DIAGNOSIS — R29.898 WEAKNESS OF BOTH LOWER EXTREMITIES: ICD-10-CM

## 2024-03-28 DIAGNOSIS — M25.561 CHRONIC PAIN OF RIGHT KNEE: Primary | ICD-10-CM

## 2024-03-28 DIAGNOSIS — M17.0 PRIMARY OSTEOARTHRITIS OF BOTH KNEES: ICD-10-CM

## 2024-03-28 DIAGNOSIS — G89.29 CHRONIC PAIN OF RIGHT KNEE: Primary | ICD-10-CM

## 2024-03-28 PROCEDURE — 97110 THERAPEUTIC EXERCISES: CPT | Mod: PO

## 2024-03-28 NOTE — PROGRESS NOTES
[facility]   Physical Therapy Treatment Note     Name: Chino Ramirez  [facility] Number: [id number]    Therapy Diagnosis:   Encounter Diagnoses   Name Primary?    Weakness of both lower extremities     Chronic pain of right knee Yes    Primary osteoarthritis of both knees        Physician: Esa Lakhani MD    Visit Date: 3/28/2024  Physician Orders: PT Eval and Treat   Medical Diagnosis from Referral: Primary osteoarthritis of both knees [M17.0]  Evaluation Date: 3/8/2024  Authorization Period Expiration: 12/31/24  Plan of Care Expiration: 5/8/24  Progress Note Due: 4/8/24  Visit # / Visits authorized: 4/ 20 + Eval   FOTO: 1/3    PTA Visit #: 0/5     Precautions: Standard     Time In: 0801   Time Out: 0857  Total Billable Time: 56 minutes    SUBJECTIVE     Pt reports: He has minimal right knee pain upon arrival.  He has been doing his HEP a couple of times a week.  He has not started going to the gym yet.  He is getting together with his family this weekend for [facility].      He was compliant with home exercise program.  Response to previous treatment: no adverse effects   Functional change: too soon to determine     Pain: pre-PT: 1/10 (right knee), post-PT: 0/10   Location: bilateral knee      OBJECTIVE     Objective Measures updated at progress report unless specified.     Treatment     Damir received the treatments listed below:      therapeutic exercises to develop strength, endurance, ROM, flexibility, posture, and core stabilization for 56 minutes including:  Upright bike level 4 x 10 minutes  Standing gastroc stretch on incline x 3 minutes   BLE Leg press (seat 3) 120# 3x 10  SL leg press x 60#, 3x10 B  Knee flexion machine unilaterally 30# 3 x 12 B  Knee extension machine unilaterally 20# 3 x 12 B  Shuttle calf raises 100# 3 x 12  Standing hip flexion machine 40# 3 x 10 B  Standing hip abduction machine 40# 3 x 10 B  Standing hip extension machine 40# 3 x 10 B  Free motion side  stepping 13# x 10 B    Patient Education and Home Exercises     Home Exercises Provided and Patient Education Provided     Education provided:   - Pt was previously given a HEP consisting of bridges, LAQs, standing HS curls, standing hip flexion, standing hip abduction, and side lying hip adduction.       Written Home Exercises Provided: yes. Exercises were reviewed and Damir was able to demonstrate them prior to the end of the session.  Damir demonstrated good  understanding of the education provided. See EMR under Patient Instructions for exercises provided during therapy sessions.    ASSESSMENT   Pt tolerated his treatment well.  He progressed to 120 pounds on the leg press with his bilateral legs and to 60 pounds for his single leg.  He began working on the standing hip machine with 40 pounds of resistance in hip flexion, abduction, and extension.  His right knee pain decreased from 1 to 0/10 by the end of the session.  He had some decreased eccentric control when returning to starting position with free motion side stepping, but he showed improvement with more repetitions. Chino TAYLOR is a 58 y.o. male referred to outpatient Physical Therapy with a medical diagnosis of Primary osteoarthritis of both knees [M17.0].  Damir Is progressing well towards his goals.  Pt prognosis is Excellent. Pt will continue to benefit from skilled outpatient physical therapy to address the deficits listed in the problem list box on initial evaluation, provide pt/family education and to maximize pt's level of independence in the home and community environment.     Plan of care discussed with patient: Yes     Anticipated Barriers for therapy: Chronicity of pain     Problem List:  Right knee pain  LE weakness    Goals:   Short Term Goals: 4 weeks   Pt will be independent with the initial phase of their HEP in order to continue to make functional gains outside of physical therapy.  Pt will be able to tolerate 40 pounds unilaterally on  the knee flexion machine for at least 2 sets of 10 repetitions to work on LE strengthening.  Pt will be able to tolerate 40 pounds unilaterally on the knee extension machine for at least 2 sets of 10 repetitions to work on LE strengthening.     Pt will be able to tolerate 130 pounds on the leg press to work on LE strengthening.  Pt's at worse right knee pain will decrease to 6/10 in order for him to tolerate ADLs better.       Long Term Goals: 8 weeks   Pt will be independent with the final phase of their HEP in order to continue to make functional gains outside of physical therapy.  Pt will be able to tolerate 50 pounds unilaterally on the knee flexion machine for at least 2 sets of 10 repetitions to work on LE strengthening.  Pt will be able to tolerate 50 pounds unilaterally on the knee extension machine for at least 2 sets of 10 repetitions to work on LE strengthening.     Pt will be able to tolerate 160 pounds on the leg press to work on LE strengthening.  Pt's at worse right knee pain will decrease to 4/10 in order for him to tolerate ADLs better.       PLAN     Continue current POC with emphasis on improving functional strength and decreasing pain.     Mark Byrne, PT, DPT

## 2024-04-05 ENCOUNTER — CLINICAL SUPPORT (OUTPATIENT)
Dept: REHABILITATION | Facility: HOSPITAL | Age: 59
End: 2024-04-05
Payer: COMMERCIAL

## 2024-04-05 DIAGNOSIS — G89.29 CHRONIC PAIN OF RIGHT KNEE: ICD-10-CM

## 2024-04-05 DIAGNOSIS — M25.561 CHRONIC PAIN OF RIGHT KNEE: ICD-10-CM

## 2024-04-05 DIAGNOSIS — R29.898 WEAKNESS OF BOTH LOWER EXTREMITIES: Primary | ICD-10-CM

## 2024-04-05 PROCEDURE — 97110 THERAPEUTIC EXERCISES: CPT | Mod: PO

## 2024-04-05 NOTE — PROGRESS NOTES
[facility]   Physical Therapy Treatment Note     Name: Chino Ramirez  [facility] Number: [id number]    Therapy Diagnosis:   Encounter Diagnoses   Name Primary?    Weakness of both lower extremities Yes    Chronic pain of right knee        Physician: Esa Lakhani MD    Visit Date: 4/5/2024  Physician Orders: PT Eval and Treat   Medical Diagnosis from Referral: Primary osteoarthritis of both knees [M17.0]  Evaluation Date: 3/8/2024  Authorization Period Expiration: 12/31/24  Plan of Care Expiration: 5/8/24  Progress Note Due: 4/8/24  Visit # / Visits authorized: 5/ 20 + Eval   FOTO: 1/3    PTA Visit #: 0/5     Precautions: Standard     Time In: 0810  Time Out: 0857  Total time: 47 minutes  Total Billable Time: 47 minutes with physical therapist and physical therapy technician    SUBJECTIVE     Pt reports: He had no knee pain complaints this session.  He was compliant with home exercise program.  Response to previous treatment: no adverse effects   Functional change: improved strength    Pain: pre-PT: 0/10, post-PT: 0/10   Location: bilateral knee      OBJECTIVE     Objective Measures updated at progress report unless specified.     Treatment     Damir received the treatments listed below:      therapeutic exercises to develop strength, endurance, ROM, flexibility, posture, and core stabilization for 47 minutes including:  Bike level 5 x 8 minutes  Standing gastroc stretch on incline x 3 minutes   BLE Leg press (seat 3) 130# 3x 10  SL leg press x 60#, 3x10 B  Knee flexion machine unilaterally 35# 3 x 10 B  Knee extension machine unilaterally 25# 3 x 10 B  Shuttle calf raises 120# 3 x 10  Standing hip flexion machine 70# 3 x 10 B  Standing hip abduction machine 55# x 10, 70# 2 x 10  Standing hip extension machine 70# 3 x 10 B  Free motion side stepping 13# x 30 B    Patient Education and Home Exercises     Home Exercises Provided and Patient Education Provided     Education provided:   -  Pt was previously given a HEP consisting of bridges, LAQs, standing HS curls, standing hip flexion, standing hip abduction, and side lying hip adduction.       Written Home Exercises Provided: yes. Exercises were reviewed and Damir was able to demonstrate them prior to the end of the session.  Damir demonstrated good  understanding of the education provided. See EMR under Patient Instructions for exercises provided during therapy sessions.    ASSESSMENT   Pt tolerated his treatment well.  He progressed to 130 pounds on the leg press with his bilateral legs.  He also progressed with increased weight on the unilateral knee extension machine, unilateral knee flexion machine, hip flexion machine, hip abduction machine, hip extension machine, Shuttle calf raises, and free motion side stepping.  He had no pain complaints this session. Chino TAYLOR is a 58 y.o. male referred to outpatient Physical Therapy with a medical diagnosis of Primary osteoarthritis of both knees [M17.0].  Damir Is progressing well towards his goals.  Pt prognosis is Excellent. Pt will continue to benefit from skilled outpatient physical therapy to address the deficits listed in the problem list box on initial evaluation, provide pt/family education and to maximize pt's level of independence in the home and community environment.     Plan of care discussed with patient: Yes     Anticipated Barriers for therapy: Chronicity of pain     Problem List:  Right knee pain  LE weakness    Goals:   Short Term Goals: 4 weeks   Pt will be independent with the initial phase of their HEP in order to continue to make functional gains outside of physical therapy.  Pt will be able to tolerate 40 pounds unilaterally on the knee flexion machine for at least 2 sets of 10 repetitions to work on LE strengthening.  Pt will be able to tolerate 40 pounds unilaterally on the knee extension machine for at least 2 sets of 10 repetitions to work on LE strengthening.     Pt will be  able to tolerate 130 pounds on the leg press to work on LE strengthening.  Pt's at worse right knee pain will decrease to 6/10 in order for him to tolerate ADLs better.       Long Term Goals: 8 weeks   Pt will be independent with the final phase of their HEP in order to continue to make functional gains outside of physical therapy.  Pt will be able to tolerate 50 pounds unilaterally on the knee flexion machine for at least 2 sets of 10 repetitions to work on LE strengthening.  Pt will be able to tolerate 50 pounds unilaterally on the knee extension machine for at least 2 sets of 10 repetitions to work on LE strengthening.     Pt will be able to tolerate 160 pounds on the leg press to work on LE strengthening.  Pt's at worse right knee pain will decrease to 4/10 in order for him to tolerate ADLs better.       PLAN     Continue current POC with emphasis on improving functional strength and decreasing pain.     Mark Byrne, PT, DPT

## 2024-04-08 ENCOUNTER — CLINICAL SUPPORT (OUTPATIENT)
Dept: REHABILITATION | Facility: HOSPITAL | Age: 59
End: 2024-04-08
Payer: COMMERCIAL

## 2024-04-08 DIAGNOSIS — G89.29 CHRONIC PAIN OF RIGHT KNEE: ICD-10-CM

## 2024-04-08 DIAGNOSIS — R29.898 WEAKNESS OF BOTH LOWER EXTREMITIES: Primary | ICD-10-CM

## 2024-04-08 DIAGNOSIS — M25.561 CHRONIC PAIN OF RIGHT KNEE: ICD-10-CM

## 2024-04-08 PROCEDURE — 97110 THERAPEUTIC EXERCISES: CPT | Mod: PO,CQ

## 2024-04-08 NOTE — PROGRESS NOTES
[facility]   Physical Therapy Treatment Note     Name: Chino Ramirez  Clinic Number: [id number]    Therapy Diagnosis:   Encounter Diagnoses   Name Primary?    Weakness of both lower extremities Yes    Chronic pain of right knee      Physician: Esa Lakhani MD    Visit Date: 4/8/2024  Physician Orders: PT Eval and Treat   Medical Diagnosis from Referral: Primary osteoarthritis of both knees [M17.0]  Evaluation Date: 3/8/2024  Authorization Period Expiration: 12/31/24  Plan of Care Expiration: 5/8/24  Progress Note Due: 4/8/24  Visit # / Visits authorized: 6/ 20 + Eval   FOTO: 2/3     PTA Visit #: 1/5     Precautions: Standard     Time In: 0817 AM (patient late)   Time Out: 0857 AM  Total Billable Time: 40 minutes (with PTA and tech)    SUBJECTIVE     Pt reports: his knees are feeling better and he is currently pain free this morning. He was compliant with home exercise program.  Response to previous treatment: no adverse effects   Functional change: improved strength    Pain:   Location: bilateral knee      OBJECTIVE     Objective Measures updated at progress report unless specified.     Treatment     Damir received the treatments listed below:      therapeutic exercises to develop strength, endurance, ROM, flexibility, posture, and core stabilization for 40 minutes including:  Bike level 5 x 8 minutes  Standing gastroc stretch on incline x 3 minutes   BLE Leg press (seat 3) 130# 3x 10  SL leg press x 60#, 3x10 B  Knee flexion machine unilaterally 35# 3 x 10 B  Knee extension machine unilaterally 25# 3 x 10 B  Shuttle calf raises 125# 3 x 10  Standing hip flexion machine 70# 3 x 10 B (NP)  Standing hip abduction machine 55# x 10, 70# 2 x 10  Standing hip extension machine 70# 3 x 10 B  Lateral stepping (green TB) 30 feet x 3 laps    Patient Education and Home Exercises     Home Exercises Provided and Patient Education Provided     Education provided:   - Pt was previously given a  HEP consisting of bridges, LAQs, standing HS curls, standing hip flexion, standing hip abduction, and side lying hip adduction.       Written Home Exercises Provided: yes. Exercises were reviewed and Damir was able to demonstrate them prior to the end of the session.  Damir demonstrated good  understanding of the education provided. See EMR under Patient Instructions for exercises provided during therapy sessions.    ASSESSMENT   Damir demonstrating good tolerance to progressive LE loading. No complaints of knee pain during today's treatment session. Appropriate training effect easily achieved with progression to lateral walking. Continue functional exercise progression.     Chino TAYLOR is a 58 y.o. male referred to outpatient Physical Therapy with a medical diagnosis of Primary osteoarthritis of both knees [M17.0].  Damir Is progressing well towards his goals.  Pt prognosis is Excellent. Pt will continue to benefit from skilled outpatient physical therapy to address the deficits listed in the problem list box on initial evaluation, provide pt/family education and to maximize pt's level of independence in the home and community environment.     Plan of care discussed with patient: Yes     Anticipated Barriers for therapy: Chronicity of pain     Problem List:  Right knee pain  LE weakness    Goals:   Short Term Goals: 4 weeks   Pt will be independent with the initial phase of their HEP in order to continue to make functional gains outside of physical therapy.  Pt will be able to tolerate 40 pounds unilaterally on the knee flexion machine for at least 2 sets of 10 repetitions to work on LE strengthening.  Pt will be able to tolerate 40 pounds unilaterally on the knee extension machine for at least 2 sets of 10 repetitions to work on LE strengthening.     Pt will be able to tolerate 130 pounds on the leg press to work on LE strengthening.  Pt's at worse right knee pain will decrease to 6/10 in order for him to tolerate  ADLs better.       Long Term Goals: 8 weeks   Pt will be independent with the final phase of their HEP in order to continue to make functional gains outside of physical therapy.  Pt will be able to tolerate 50 pounds unilaterally on the knee flexion machine for at least 2 sets of 10 repetitions to work on LE strengthening.  Pt will be able to tolerate 50 pounds unilaterally on the knee extension machine for at least 2 sets of 10 repetitions to work on LE strengthening.     Pt will be able to tolerate 160 pounds on the leg press to work on LE strengthening.  Pt's at worse right knee pain will decrease to 4/10 in order for him to tolerate ADLs better.       PLAN     Continue current POC with emphasis on improving functional strength and decreasing pain.     Adelia Ham, PTA

## 2024-08-23 ENCOUNTER — OFFICE VISIT (OUTPATIENT)
Dept: FAMILY MEDICINE | Facility: CLINIC | Age: 59
End: 2024-08-23
Payer: COMMERCIAL

## 2024-08-23 VITALS
DIASTOLIC BLOOD PRESSURE: 62 MMHG | WEIGHT: 175.94 LBS | BODY MASS INDEX: 28.27 KG/M2 | HEIGHT: 66 IN | OXYGEN SATURATION: 96 % | HEART RATE: 73 BPM | SYSTOLIC BLOOD PRESSURE: 138 MMHG

## 2024-08-23 DIAGNOSIS — H00.024 HORDEOLUM INTERNUM OF LEFT UPPER EYELID: Primary | ICD-10-CM

## 2024-08-23 PROCEDURE — 99999 PR PBB SHADOW E&M-EST. PATIENT-LVL III: CPT | Mod: PBBFAC,,, | Performed by: FAMILY MEDICINE

## 2024-08-23 RX ORDER — MULTIVITAMIN
1 TABLET ORAL DAILY
COMMUNITY

## 2024-08-23 RX ORDER — AMOXICILLIN 500 MG
CAPSULE ORAL DAILY
COMMUNITY

## 2024-08-23 RX ORDER — UBIDECARENONE 30 MG
30 CAPSULE ORAL 3 TIMES DAILY
COMMUNITY

## 2024-08-23 RX ORDER — LEVOFLOXACIN 500 MG/1
500 TABLET, FILM COATED ORAL DAILY
Qty: 7 TABLET | Refills: 1 | Status: SHIPPED | OUTPATIENT
Start: 2024-08-23

## 2024-08-23 NOTE — PROGRESS NOTES
"Subjective:       Patient ID: Chino Ramirez is a 59 y.o. male.    Chief Complaint: eye irritation (Ongoing for 3 days, hurting not itching.)    HPI:  Pleasant 59-year-old patient comes in with a 2 day history of stye in the left upper lid.  Just have a situation where he is eyelashes will growing where towards his I and he does see a optometry occasionally for that.  Seven no systemic symptoms currently.  Does not have the lash issue going on right now.    Review of Systems   Constitutional: Negative.    HENT: Negative.     Eyes: Negative.    Respiratory: Negative.     Cardiovascular: Negative.    Gastrointestinal: Negative.    Endocrine: Negative.    Genitourinary: Negative.    Musculoskeletal: Negative.    Skin: Negative.    Allergic/Immunologic: Negative.    Neurological: Negative.    Hematological: Negative.    Psychiatric/Behavioral: Negative.         Objective:      Vitals:    08/23/24 1435   BP: 138/62   Pulse: 73   SpO2: 96%   Weight: 79.8 kg (175 lb 14.8 oz)   Height: 5' 6" (1.676 m)      Physical Exam  Eyes:      Comments: Swelling of the left upper eyelid with erythema consistent with internal hordeolum.         Results for orders placed or performed in visit on 09/26/23   TESTOSTERONE   Result Value Ref Range    Testosterone, Total 663 304 - 1227 ng/dL   CBC W/ AUTO DIFFERENTIAL   Result Value Ref Range    WBC 5.14 3.90 - 12.70 K/uL    RBC 5.93 4.60 - 6.20 M/uL    Hemoglobin 18.2 (H) 14.0 - 18.0 g/dL    Hematocrit 56.6 (H) 40.0 - 54.0 %    MCV 95 82 - 98 fL    MCH 30.7 27.0 - 31.0 pg    MCHC 32.2 32.0 - 36.0 g/dL    RDW 13.0 11.5 - 14.5 %    Platelets 249 150 - 450 K/uL    MPV 10.6 9.2 - 12.9 fL    Immature Granulocytes 0.2 0.0 - 0.5 %    Gran # (ANC) 2.2 1.8 - 7.7 K/uL    Immature Grans (Abs) 0.01 0.00 - 0.04 K/uL    Lymph # 2.2 1.0 - 4.8 K/uL    Mono # 0.5 0.3 - 1.0 K/uL    Eos # 0.2 0.0 - 0.5 K/uL    Baso # 0.06 0.00 - 0.20 K/uL    nRBC 0 0 /100 WBC    Gran % 42.2 38.0 - 73.0 %    Lymph % 41.8 " 18.0 - 48.0 %    Mono % 10.3 4.0 - 15.0 %    Eosinophil % 4.3 0.0 - 8.0 %    Basophil % 1.2 0.0 - 1.9 %    Differential Method Automated       Assessment:       1. Hordeolum internum of left upper eyelid        Plan:       Hordeolum internum of left upper eyelid  -     levoFLOXacin (LEVAQUIN) 500 MG tablet; Take 1 tablet (500 mg total) by mouth once daily.  Dispense: 7 tablet; Refill: 1          Medication List with Changes/Refills   New Medications    LEVOFLOXACIN (LEVAQUIN) 500 MG TABLET    Take 1 tablet (500 mg total) by mouth once daily.   Current Medications    ALPRAZOLAM (XANAX) 0.5 MG TABLET    TAKE 1 TABLET BY MOUTH DAILY AS NEEDED FOR ANXIETY    ASTAXANTHIN ORAL    Take by mouth.    CO-ENZYME Q-10 30 MG CAPSULE    Take 30 mg by mouth 3 (three) times daily.    DULOXETINE (CYMBALTA) 30 MG CAPSULE    Take 1 capsule (30 mg total) by mouth once daily.    MULTIVITAMIN (THERAGRAN) PER TABLET    Take 1 tablet by mouth once daily.    OMEGA-3 FATTY ACIDS/FISH OIL (FISH OIL-OMEGA-3 FATTY ACIDS) 300-1,000 MG CAPSULE    Take by mouth once daily.    TESTOSTERONE CYPIONATE (DEPOTESTOTERONE CYPIONATE) 200 MG/ML INJECTION    Inject 1 mL (200 mg total) into the muscle every 14 (fourteen) days.    UNABLE TO FIND    medication name: tonglat ali

## 2025-03-18 ENCOUNTER — PATIENT MESSAGE (OUTPATIENT)
Dept: CARDIOLOGY | Facility: CLINIC | Age: 60
End: 2025-03-18
Payer: COMMERCIAL

## 2025-04-14 ENCOUNTER — OFFICE VISIT (OUTPATIENT)
Dept: CARDIOLOGY | Facility: CLINIC | Age: 60
End: 2025-04-14
Payer: COMMERCIAL

## 2025-04-14 VITALS
WEIGHT: 177.5 LBS | HEART RATE: 88 BPM | BODY MASS INDEX: 28.64 KG/M2 | DIASTOLIC BLOOD PRESSURE: 70 MMHG | SYSTOLIC BLOOD PRESSURE: 121 MMHG

## 2025-04-14 DIAGNOSIS — R00.2 PALPITATIONS: ICD-10-CM

## 2025-04-14 DIAGNOSIS — E78.5 HYPERLIPIDEMIA, UNSPECIFIED HYPERLIPIDEMIA TYPE: Primary | ICD-10-CM

## 2025-04-14 PROCEDURE — 3074F SYST BP LT 130 MM HG: CPT | Mod: CPTII,S$GLB,, | Performed by: INTERNAL MEDICINE

## 2025-04-14 PROCEDURE — 1160F RVW MEDS BY RX/DR IN RCRD: CPT | Mod: CPTII,S$GLB,, | Performed by: INTERNAL MEDICINE

## 2025-04-14 PROCEDURE — 99214 OFFICE O/P EST MOD 30 MIN: CPT | Mod: S$GLB,,, | Performed by: INTERNAL MEDICINE

## 2025-04-14 PROCEDURE — 3008F BODY MASS INDEX DOCD: CPT | Mod: CPTII,S$GLB,, | Performed by: INTERNAL MEDICINE

## 2025-04-14 PROCEDURE — 99999 PR PBB SHADOW E&M-EST. PATIENT-LVL III: CPT | Mod: PBBFAC,,, | Performed by: INTERNAL MEDICINE

## 2025-04-14 PROCEDURE — 3078F DIAST BP <80 MM HG: CPT | Mod: CPTII,S$GLB,, | Performed by: INTERNAL MEDICINE

## 2025-04-14 PROCEDURE — 1159F MED LIST DOCD IN RCRD: CPT | Mod: CPTII,S$GLB,, | Performed by: INTERNAL MEDICINE

## 2025-04-14 NOTE — PROGRESS NOTES
Subjective:    Patient ID:  Chino Ramirez is a 59 y.o. male who presents for follow-up of Annual Exam      HPI  He comes for follow up with no major problems, no chest pain, no shortness of breath.  Occasional shortness of breath and not going to the gym as regular as before.    Blood pressure is not checked at home.      Review of Systems   Constitutional: Negative for decreased appetite, malaise/fatigue, weight gain and weight loss.   Cardiovascular:  Negative for chest pain, dyspnea on exertion, leg swelling, palpitations and syncope.   Respiratory:  Negative for cough and shortness of breath.    Gastrointestinal: Negative.    Neurological:  Negative for weakness.   All other systems reviewed and are negative.     Objective:      Physical Exam  Vitals and nursing note reviewed.   Constitutional:       Appearance: Normal appearance. He is well-developed.   HENT:      Head: Normocephalic.   Eyes:      Pupils: Pupils are equal, round, and reactive to light.   Neck:      Thyroid: No thyromegaly.      Vascular: No carotid bruit or JVD.   Cardiovascular:      Rate and Rhythm: Normal rate and regular rhythm.      Chest Wall: PMI is not displaced.      Pulses: Normal pulses and intact distal pulses.      Heart sounds: Normal heart sounds. No murmur heard.     No gallop.   Pulmonary:      Effort: Pulmonary effort is normal.      Breath sounds: Normal breath sounds.   Abdominal:      Palpations: Abdomen is soft. There is no mass.      Tenderness: There is no abdominal tenderness.   Musculoskeletal:         General: Normal range of motion.      Cervical back: Normal range of motion and neck supple.   Skin:     General: Skin is warm.   Neurological:      Mental Status: He is alert and oriented to person, place, and time.      Sensory: No sensory deficit.      Deep Tendon Reflexes: Reflexes are normal and symmetric.         Most Recent EKG Results  Results for orders placed or performed in visit on 05/26/20   IN OFFICE  EKG 12-LEAD (to [facility])    Collection Time: 05/26/20  2:03 PM    Narrative    Test Reason : R07.9,    Vent. Rate : 052 BPM     Atrial Rate : 052 BPM     P-R Int : 168 ms          QRS Dur : 074 ms      QT Int : 426 ms       P-R-T Axes : 026 021 028 degrees     QTc Int : 396 ms    Sinus bradycardia  Otherwise normal ECG  When compared with ECG of 11-MAY-2016 13:54,  Sinus rhythm has replaced Atrial fibrillation  Vent. rate has decreased BY  57 BPM  The axis Shifted right  Minimal criteria for Anteroseptal infarct are no longer Present  Nonspecific T wave abnormality, improved in Inferior leads  Nonspecific T wave abnormality, improved in Lateral leads  Confirmed by CHRIS VELASQUEZ MD ([id number]) on 5/27/2020 11:44:08 AM    Referred By: [email or web address]   SELF           Confirmed By:CHRIS VELASQUEZ MD       Most Recent Echocardiogram Results  Results for orders placed in visit on 07/21/20    Stress Echo    Interpretation Summary  · Normal left ventricular systolic function. The estimated ejection fraction is 60%.  · Normal LV diastolic function.  · Normal right ventricular systolic function.  · Mild mitral regurgitation.  · Normal central venous pressure (3 mmHg).  · The estimated PA systolic pressure is 31 mmHg.  · The stress echo portion of this study is negative for myocardial ischemia.      Most Recent Nuclear Stress Test Results  No results found for this or any previous visit.      Most Recent Cardiac PET Stress Test Results  No results found for this or any previous visit.      Most Recent Cardiovascular Angiogram results  No results found for this or any previous visit.      Other Most Recent Cardiology Results  No results found for this or any previous visit.      Labs reviewed    Assessment:       1. Hyperlipidemia, unspecified hyperlipidemia type    2. Palpitations         Plan:     Will get exercise stress echocardiogram with color-flow Doppler as well as blood work.    Call with results.  Follow-up in 1 year if tests  okay

## 2025-04-15 ENCOUNTER — LAB VISIT (OUTPATIENT)
Dept: LAB | Facility: HOSPITAL | Age: 60
End: 2025-04-15
Attending: INTERNAL MEDICINE
Payer: COMMERCIAL

## 2025-04-15 DIAGNOSIS — R00.2 PALPITATIONS: ICD-10-CM

## 2025-04-15 DIAGNOSIS — E78.5 HYPERLIPIDEMIA, UNSPECIFIED HYPERLIPIDEMIA TYPE: ICD-10-CM

## 2025-04-15 LAB
ALBUMIN SERPL BCP-MCNC: 3.3 G/DL (ref 3.5–5.2)
ALP SERPL-CCNC: 46 UNIT/L (ref 40–150)
ALT SERPL W/O P-5'-P-CCNC: 29 UNIT/L (ref 10–44)
ANION GAP (OHS): 5 MMOL/L (ref 8–16)
AST SERPL-CCNC: 28 UNIT/L (ref 11–45)
BILIRUB DIRECT SERPL-MCNC: 0.1 MG/DL (ref 0.1–0.3)
BILIRUB SERPL-MCNC: 0.4 MG/DL (ref 0.1–1)
BNP SERPL-MCNC: <10 PG/ML (ref 0–99)
BUN SERPL-MCNC: 18 MG/DL (ref 6–20)
CALCIUM SERPL-MCNC: 8.9 MG/DL (ref 8.7–10.5)
CHLORIDE SERPL-SCNC: 106 MMOL/L (ref 95–110)
CHOLEST SERPL-MCNC: 192 MG/DL (ref 120–199)
CHOLEST/HDLC SERPL: 9.6 {RATIO} (ref 2–5)
CO2 SERPL-SCNC: 26 MMOL/L (ref 23–29)
CREAT SERPL-MCNC: 1.1 MG/DL (ref 0.5–1.4)
GFR SERPLBLD CREATININE-BSD FMLA CKD-EPI: >60 ML/MIN/1.73/M2
GLUCOSE SERPL-MCNC: 86 MG/DL (ref 70–110)
HDLC SERPL-MCNC: 20 MG/DL (ref 40–75)
HDLC SERPL: 10.4 % (ref 20–50)
LDLC SERPL CALC-MCNC: 148.4 MG/DL (ref 63–159)
NONHDLC SERPL-MCNC: 172 MG/DL
POTASSIUM SERPL-SCNC: 4.3 MMOL/L (ref 3.5–5.1)
PROT SERPL-MCNC: 6.7 GM/DL (ref 6–8.4)
SODIUM SERPL-SCNC: 137 MMOL/L (ref 136–145)
TRIGL SERPL-MCNC: 118 MG/DL (ref 30–150)
TSH SERPL-ACNC: 2.17 UIU/ML (ref 0.4–4)

## 2025-04-15 PROCEDURE — 82465 ASSAY BLD/SERUM CHOLESTEROL: CPT

## 2025-04-15 PROCEDURE — 82310 ASSAY OF CALCIUM: CPT

## 2025-04-15 PROCEDURE — 36415 COLL VENOUS BLD VENIPUNCTURE: CPT | Mod: PO

## 2025-04-15 PROCEDURE — 84075 ASSAY ALKALINE PHOSPHATASE: CPT

## 2025-04-15 PROCEDURE — 83880 ASSAY OF NATRIURETIC PEPTIDE: CPT

## 2025-04-15 PROCEDURE — 84443 ASSAY THYROID STIM HORMONE: CPT

## 2025-04-22 ENCOUNTER — HOSPITAL ENCOUNTER (OUTPATIENT)
Dept: CARDIOLOGY | Facility: HOSPITAL | Age: 60
Discharge: HOME | End: 2025-04-22
Attending: INTERNAL MEDICINE
Payer: COMMERCIAL

## 2025-04-22 VITALS — HEIGHT: 66 IN | WEIGHT: 175 LBS | BODY MASS INDEX: 28.12 KG/M2

## 2025-04-22 DIAGNOSIS — E78.5 HYPERLIPIDEMIA, UNSPECIFIED HYPERLIPIDEMIA TYPE: ICD-10-CM

## 2025-04-22 DIAGNOSIS — R00.2 PALPITATIONS: ICD-10-CM

## 2025-04-22 PROCEDURE — 93351 STRESS TTE COMPLETE: CPT | Mod: PO

## 2025-04-22 PROCEDURE — 93325 DOPPLER ECHO COLOR FLOW MAPG: CPT | Mod: 26,,, | Performed by: INTERNAL MEDICINE

## 2025-04-22 PROCEDURE — 93351 STRESS TTE COMPLETE: CPT | Mod: 26,,, | Performed by: INTERNAL MEDICINE

## 2025-04-22 PROCEDURE — 93320 DOPPLER ECHO COMPLETE: CPT | Mod: 26,,, | Performed by: INTERNAL MEDICINE

## 2025-04-23 LAB
ASCENDING AORTA: 2.87 CM
AV INDEX (PROSTH): 0.74
AV MEAN GRADIENT: 5 MMHG
AV PEAK GRADIENT: 9 MMHG
AV VALVE AREA BY VELOCITY RATIO: 2.5 CM²
AV VALVE AREA: 2.6 CM²
AV VELOCITY RATIO: 0.73
BSA FOR ECHO PROCEDURE: 1.92 M2
CV ECHO LV RWT: 0.4 CM
CV STRESS BASE HR: 87 BPM
DIASTOLIC BLOOD PRESSURE: 80 MMHG
DOP CALC AO PEAK VEL: 1.5 M/S
DOP CALC AO VTI: 29.7 CM
DOP CALC LVOT AREA: 3.5 CM2
DOP CALC LVOT DIAMETER: 2.1 CM
DOP CALC LVOT PEAK VEL: 1.1 M/S
DOP CALC LVOT STROKE VOLUME: 75.8 CM3
DOP CALCLVOT PEAK VEL VTI: 21.9 CM
E WAVE DECELERATION TIME: 179 MSEC
E/A RATIO: 0.89
E/E' RATIO: 7 M/S
ECHO LV POSTERIOR WALL: 0.9 CM (ref 0.6–1.1)
FRACTIONAL SHORTENING: 33.3 % (ref 28–44)
INTERVENTRICULAR SEPTUM: 1 CM (ref 0.6–1.1)
LEFT ATRIUM AREA SYSTOLIC (APICAL 2 CHAMBER): 14.37 CM2
LEFT ATRIUM AREA SYSTOLIC (APICAL 4 CHAMBER): 15.28 CM2
LEFT ATRIUM SIZE: 3.6 CM
LEFT ATRIUM VOLUME INDEX MOD: 21 ML/M2
LEFT ATRIUM VOLUME MOD: 40 ML
LEFT INTERNAL DIMENSION IN SYSTOLE: 3 CM (ref 2.1–4)
LEFT VENTRICLE DIASTOLIC VOLUME INDEX: 49.21 ML/M2
LEFT VENTRICLE DIASTOLIC VOLUME: 93 ML
LEFT VENTRICLE END SYSTOLIC VOLUME APICAL 2 CHAMBER: 37.12 ML
LEFT VENTRICLE END SYSTOLIC VOLUME APICAL 4 CHAMBER: 41.63 ML
LEFT VENTRICLE MASS INDEX: 75.6 G/M2
LEFT VENTRICLE SYSTOLIC VOLUME INDEX: 18.5 ML/M2
LEFT VENTRICLE SYSTOLIC VOLUME: 35 ML
LEFT VENTRICULAR INTERNAL DIMENSION IN DIASTOLE: 4.5 CM (ref 3.5–6)
LEFT VENTRICULAR MASS: 142.9 G
LV LATERAL E/E' RATIO: 5.6 M/S
LV SEPTAL E/E' RATIO: 8.4 M/S
LVED V (TEICH): 93.1 ML
LVES V (TEICH): 35.14 ML
LVOT MG: 2.56 MMHG
LVOT MV: 0.76 CM/S
MV PEAK A VEL: 0.75 M/S
MV PEAK E VEL: 0.67 M/S
OHS CV CPX 1 MINUTE RECOVERY HEART RATE: 126 BPM
OHS CV CPX 85 PERCENT MAX PREDICTED HEART RATE MALE: 136
OHS CV CPX ESTIMATED METS: 15
OHS CV CPX MAX PREDICTED HEART RATE: 160
OHS CV CPX PATIENT IS FEMALE: 0
OHS CV CPX PATIENT IS MALE: 1
OHS CV CPX PEAK DIASTOLIC BLOOD PRESSURE: 63 MMHG
OHS CV CPX PEAK HEAR RATE: 166 BPM
OHS CV CPX PEAK RATE PRESSURE PRODUCT: NORMAL
OHS CV CPX PEAK SYSTOLIC BLOOD PRESSURE: 196 MMHG
OHS CV CPX PERCENT MAX PREDICTED HEART RATE ACHIEVED: 104
OHS CV CPX RATE PRESSURE PRODUCT PRESENTING: NORMAL
OHS CV RV/LV RATIO: 0.87 CM
PISA TR MAX VEL: 2.4 M/S
PULM VEIN S/D RATIO: 0.95
PV PEAK D VEL: 0.39 M/S
PV PEAK S VEL: 0.37 M/S
RA PRESSURE ESTIMATED: 3 MMHG
RA VOL SYS: 32.57 ML
RIGHT ATRIAL AREA: 12.5 CM2
RIGHT ATRIUM VOLUME AREA LENGTH APICAL 4 CHAMBER: 31.85 ML
RIGHT VENTRICLE DIASTOLIC BASEL DIMENSION: 3.9 CM
RIGHT VENTRICLE DIASTOLIC LENGTH: 7.3 CM
RIGHT VENTRICLE DIASTOLIC MID DIMENSION: 2.2 CM
RIGHT VENTRICULAR END-DIASTOLIC DIMENSION: 3.89 CM
RIGHT VENTRICULAR LENGTH IN DIASTOLE (APICAL 4-CHAMBER VIEW): 7.34 CM
RV MID DIAMA: 2.19 CM
RV TB RVSP: 5 MMHG
RV TISSUE DOPPLER FREE WALL SYSTOLIC VELOCITY 1 (APICAL 4 CHAMBER VIEW): 13.52 CM/S
SINUS: 2.82 CM
STJ: 2.74 CM
STRESS ECHO POST EXERCISE DUR MIN: 8 MINUTES
STRESS ECHO POST EXERCISE DUR SEC: 26 SECONDS
STRESS ST DEPRESSION: 0.5 MM
SYSTOLIC BLOOD PRESSURE: 133 MMHG
TDI LATERAL: 0.12 M/S
TDI SEPTAL: 0.08 M/S
TDI: 0.1 M/S
TR MAX PG: 24 MMHG
TRICUSPID ANNULAR PLANE SYSTOLIC EXCURSION: 2.04 CM
TV REST PULMONARY ARTERY PRESSURE: 26 MMHG
Z-SCORE OF LEFT VENTRICULAR DIMENSION IN END DIASTOLE: -1.62
Z-SCORE OF LEFT VENTRICULAR DIMENSION IN END SYSTOLE: -0.66

## 2025-05-19 ENCOUNTER — OFFICE VISIT (OUTPATIENT)
Facility: CLINIC | Age: 60
End: 2025-05-19
Payer: COMMERCIAL

## 2025-05-19 DIAGNOSIS — L73.8 SEBACEOUS HYPERPLASIA: ICD-10-CM

## 2025-05-19 DIAGNOSIS — L82.1 SEBORRHEIC KERATOSES: ICD-10-CM

## 2025-05-19 DIAGNOSIS — D22.9 MULTIPLE BENIGN NEVI: Primary | ICD-10-CM

## 2025-05-19 DIAGNOSIS — D48.5 NEOPLASM OF UNCERTAIN BEHAVIOR OF SKIN: ICD-10-CM

## 2025-05-19 DIAGNOSIS — L81.4 LENTIGINES: ICD-10-CM

## 2025-05-19 PROCEDURE — 11103 TANGNTL BX SKIN EA SEP/ADDL: CPT | Mod: S$GLB,,, | Performed by: DERMATOLOGY

## 2025-05-19 PROCEDURE — 1159F MED LIST DOCD IN RCRD: CPT | Mod: CPTII,S$GLB,, | Performed by: DERMATOLOGY

## 2025-05-19 PROCEDURE — 99999 PR PBB SHADOW E&M-EST. PATIENT-LVL III: CPT | Mod: PBBFAC,,, | Performed by: DERMATOLOGY

## 2025-05-19 PROCEDURE — 99213 OFFICE O/P EST LOW 20 MIN: CPT | Mod: 25,S$GLB,, | Performed by: DERMATOLOGY

## 2025-05-19 PROCEDURE — 11102 TANGNTL BX SKIN SINGLE LES: CPT | Mod: S$GLB,,, | Performed by: DERMATOLOGY

## 2025-05-19 PROCEDURE — 88305 TISSUE EXAM BY PATHOLOGIST: CPT | Mod: TC,91 | Performed by: DERMATOLOGY

## 2025-05-19 NOTE — PATIENT INSTRUCTIONS
Shave Biopsy Wound Care    Your doctor has performed a shave biopsy today.  A band aid and vaseline ointment has been placed over the site.  This should remain in place for NO LONGER THAN 48 hours.  It is fine to remove the bandaid after 24 hours, if the area is no longer bleeding. It is recommended that you keep the area dry (do not wet)) for the first 24 hours.  After 24 hours, wash the area with warm soap and water and apply Vaseline jelly.  Many patients prefer to use Neosporin or Bacitracin ointment.  This is acceptable; however, know that you can develop an allergy to this medication even if you have used it safely for years.  It is important to keep the area moist.  Letting it dry out and get air slows healing time, and will worsen the scar.        If you notice increasing redness, tenderness, pain, or yellow drainage at the biopsy site, please notify your doctor.  These are signs of an infection.    If your biopsy site is bleeding, apply firm pressure for 15 minutes straight.  Repeat for another 15 minutes, if it is still bleeding.   If the surgical site continues to bleed, then please contact your doctor.      For [facility] users:   You will receive your biopsy results in [facility] as soon as they are available. Please be assured that your physician/provider will review your results and will then determine what further treatment, evaluation, or planning is required. You should be contacted by your physician's/provider's office within 5 business days of receiving your results; If not, please reach out to directly. This is one more way [employer] [facility] putting you first.     [facility] [place] [address]/ [phone] FAX/ [email or web address]

## 2025-05-19 NOTE — PROGRESS NOTES
Subjective:      Patient ID:  Chino Ramirez is a 59 y.o. male who presents for   Chief Complaint   Patient presents with    Skin Check     HPI    Established patient.  Here today for total body skin exam.   No personal or known family hx skin cancer.    Hx of significant sun exposure in past.     Review of Systems    Objective:   Physical Exam   Constitutional: He appears well-developed and well-nourished. No distress.   Neurological: He is alert. He is not disoriented.   Psychiatric: He has a normal mood and affect.   Skin:   Areas Examined (abnormalities noted in diagram):   Scalp / Hair Palpated and Inspected  Head / Face Inspection Performed  Neck Inspection Performed  Chest / Axilla Inspection Performed  Abdomen Inspection Performed  Genitals / Buttocks / Groin Inspection Performed  Back Inspection Performed  RUE Inspected  LUE Inspection Performed  RLE Inspected  LLE Inspection Performed                         Diagram Legend     Erythematous scaling macule/papule c/w actinic keratosis       Vascular papule c/w angioma      Pigmented verrucoid papule/plaque c/w seborrheic keratosis      Yellow umbilicated papule c/w sebaceous hyperplasia      Irregularly shaped tan macule c/w lentigo     1-2 mm smooth white papules consistent with Milia      Movable subcutaneous cyst with punctum c/w epidermal inclusion cyst      Subcutaneous movable cyst c/w pilar cyst      Firm pink to brown papule c/w dermatofibroma      Pedunculated fleshy papule(s) c/w skin tag(s)      Evenly pigmented macule c/w junctional nevus     Mildly variegated pigmented, slightly irregular-bordered macule c/w mildly atypical nevus      Flesh colored to evenly pigmented papule c/w intradermal nevus       Pink pearly papule/plaque c/w basal cell carcinoma      Erythematous hyperkeratotic cursted plaque c/w SCC      Surgical scar with no sign of skin cancer recurrence      Open and closed comedones      Inflammatory papules and pustules       Verrucoid papule consistent consistent with wart     Erythematous eczematous patches and plaques     Dystrophic onycholytic nail with subungual debris c/w onychomycosis     Umbilicated papule    Erythematous-base heme-crusted tan verrucoid plaque consistent with inflamed seborrheic keratosis     Erythematous Silvery Scaling Plaque c/w Psoriasis     See annotation            Assessment / Plan:      NUB  - Discussed diagnosis with patient and explained uncertain nature of condition, including ddx.   - Discussed treatment options (biopsy, close monitoring) with patient, including the risks and benefits of each. Patient opted to pursue biopsy.  - Shave Biopsy Procedure Note: Discussed procedure with patient/patient's guardian including risks and benefits as well as treatment alternatives. Risks of procedure include pain, bleeding, infection, post-inflammatory pigmentary alteration, scar, recurrence. Patient informed that the purpose of a biopsy is sampling of condition in question rather than removal in entirety; further treatment may be necessary. Verbal consent obtained. Area to be biopsied marked and cleansed with alcohol. Local anesthesia achieved by injecting approximately 1 cc of 1% lidocaine with epinephrine. Two shave biopsies performed using a double edge razor blade; specimens submitted to pathology. Hemostasis achieved with aluminum chloride. Petroleum jelly and bandage applied to wounds. Patient tolerated procedures well. After-visit wound care instructions reviewed and provided in writing.     Multiple benign nevi  - Discussed diagnosis, etiology, and benign-nature of condition.  - Reassured; no lesions suspicious for malignancy noted on exam today.   - Recommended routine self examination of skin. Discussed the ABCDEs of melanoma and ugly duckling sign.   - Recommended daily sun protection, including the use of OTC broad-spectrum sunscreen (SPF 30 or greater) and sun-protective clothing.      Lentigines  -  Benign; reassured treatment not necessary.   - Recommended daily sun protection, including the use of OTC broad-spectrum sunscreen (SPF 30 or greater) and sun-protective clothing.       Seborrheic keratoses  Sebaceous hyperplasia   - Benign; reassured treatment not necessary.      Screening for skin cancer  - Total body skin examination performed today.  - Findings listed above.   - Recommended routine self examination of skin.    - Recommended daily sun protection, including the use of OTC broad-spectrum sunscreen (SPF 30 or greater) and sun-protective clothing.           Follow up for pending pathology.

## 2025-05-22 LAB
ESTROGEN SERPL-MCNC: NORMAL PG/ML
INSULIN SERPL-ACNC: NORMAL U[IU]/ML
LAB AP CLINICAL INFORMATION: NORMAL
LAB AP GROSS DESCRIPTION: NORMAL
LAB AP REPORT FOOTNOTES: NORMAL
T3RU NFR SERPL: NORMAL %

## 2025-05-28 ENCOUNTER — RESULTS FOLLOW-UP (OUTPATIENT)
Facility: CLINIC | Age: 60
End: 2025-05-28

## 2025-05-28 NOTE — PROGRESS NOTES
1. Skin, left dorsal forearm, shave biopsy:   - PIGMENTED SEBORRHEIC KERATOSIS.  2. Skin, left calf, shave biopsy:  - PIGMENTED SEBORRHEIC KERATOSIS.    Benign; reassured further treatment not necessary via portal message.

## (undated) DEVICE — SEE MEDLINE ITEM 146292

## (undated) DEVICE — SYR 30CC LUER LOCK

## (undated) DEVICE — BLADE GATOR 3.5 6 EACH/BOX

## (undated) DEVICE — PAD PREP 50/CA

## (undated) DEVICE — SEE MEDLINE ITEM 154981

## (undated) DEVICE — PAD CAST SPECIALIST STRL 6

## (undated) DEVICE — NDL SPINAL 18GX3.5 SPINOCAN

## (undated) DEVICE — DRESSING XEROFORM FOIL PK 1X8

## (undated) DEVICE — SUT ETHILON 3-0 PS2 18 BLK

## (undated) DEVICE — TUBE SET INFLOW/OUTFLOW

## (undated) DEVICE — Device

## (undated) DEVICE — SEE MEDLINE ITEM 146313

## (undated) DEVICE — GLOVE BIOGEL ECLIPSE SZ 7.5

## (undated) DEVICE — SEE MEDLINE ITEM 157131

## (undated) DEVICE — STOCKINET TUBULAR 1 PLY 6X60IN

## (undated) DEVICE — DRAPE EXTREMITY W/ABC NON-SLIP

## (undated) DEVICE — SOL IRR NACL .9% 3000ML

## (undated) DEVICE — BLADE SURG CARBON STEEL SZ11

## (undated) DEVICE — UNDERGLOVES BIOGEL PI SIZE 8

## (undated) DEVICE — BANDAGE ACE ELASTIC 6"

## (undated) DEVICE — SEE MEDLINE ITEM 157128

## (undated) DEVICE — NEPTUNE 4 PORT MANIFOLD

## (undated) DEVICE — GAUZE SPONGE 4X4 12PLY

## (undated) DEVICE — MAT QUICK 40X30 FLOOR FLUID LF